# Patient Record
Sex: FEMALE | Race: WHITE | NOT HISPANIC OR LATINO | Employment: OTHER | URBAN - METROPOLITAN AREA
[De-identification: names, ages, dates, MRNs, and addresses within clinical notes are randomized per-mention and may not be internally consistent; named-entity substitution may affect disease eponyms.]

---

## 2017-01-01 ENCOUNTER — HOSPITAL ENCOUNTER (OUTPATIENT)
Dept: LAB | Facility: MEDICAL CENTER | Age: 62
End: 2017-01-01
Attending: INTERNAL MEDICINE
Payer: MEDICARE

## 2017-01-05 LAB
HCT VFR BLD AUTO: 31.5 % (ref 37–47)
HGB BLD-MCNC: 9.4 G/DL (ref 12–16)

## 2017-01-05 PROCEDURE — 36415 COLL VENOUS BLD VENIPUNCTURE: CPT

## 2017-01-05 PROCEDURE — 85014 HEMATOCRIT: CPT

## 2017-01-05 PROCEDURE — 85018 HEMOGLOBIN: CPT

## 2017-01-11 ENCOUNTER — HOME HEALTH ADMISSION (OUTPATIENT)
Dept: HOME HEALTH SERVICES | Facility: HOME HEALTHCARE | Age: 62
End: 2017-01-11
Payer: MEDICARE

## 2017-01-11 LAB
HCT VFR BLD AUTO: 30.9 % (ref 37–47)
HGB BLD-MCNC: 9.5 G/DL (ref 12–16)

## 2017-01-11 PROCEDURE — 85018 HEMOGLOBIN: CPT

## 2017-01-11 PROCEDURE — 36415 COLL VENOUS BLD VENIPUNCTURE: CPT

## 2017-01-11 PROCEDURE — 85014 HEMATOCRIT: CPT

## 2017-01-14 ENCOUNTER — HOME CARE VISIT (OUTPATIENT)
Dept: HOME HEALTH SERVICES | Facility: HOME HEALTHCARE | Age: 62
End: 2017-01-14
Payer: MEDICARE

## 2017-01-14 VITALS
RESPIRATION RATE: 18 BRPM | HEART RATE: 92 BPM | TEMPERATURE: 97.4 F | HEIGHT: 67 IN | SYSTOLIC BLOOD PRESSURE: 137 MMHG | DIASTOLIC BLOOD PRESSURE: 84 MMHG

## 2017-01-14 PROCEDURE — 665998 HH PPS REVENUE CREDIT

## 2017-01-14 PROCEDURE — 665001 SOC-HOME HEALTH

## 2017-01-14 PROCEDURE — 665999 HH PPS REVENUE DEBIT

## 2017-01-14 PROCEDURE — G0162 HHC RN E&M PLAN SVS, 15 MIN: HCPCS

## 2017-01-15 PROCEDURE — 665998 HH PPS REVENUE CREDIT

## 2017-01-15 PROCEDURE — 665999 HH PPS REVENUE DEBIT

## 2017-01-15 SDOH — ECONOMIC STABILITY: HOUSING INSECURITY: UNSAFE APPLIANCES: 0

## 2017-01-15 SDOH — ECONOMIC STABILITY: HOUSING INSECURITY: UNSAFE COOKING RANGE AREA: 0

## 2017-01-15 ASSESSMENT — ENCOUNTER SYMPTOMS: MENTAL STATUS CHANGE: 0

## 2017-01-15 ASSESSMENT — PATIENT HEALTH QUESTIONNAIRE - PHQ9
1. LITTLE INTEREST OR PLEASURE IN DOING THINGS: 01
2. FEELING DOWN, DEPRESSED, IRRITABLE, OR HOPELESS: 01

## 2017-01-15 ASSESSMENT — ACTIVITIES OF DAILY LIVING (ADL)
HOME_HEALTH_OASIS: 02
OASIS_M1830: 03

## 2017-01-16 ENCOUNTER — HOME CARE VISIT (OUTPATIENT)
Dept: HOME HEALTH SERVICES | Facility: HOME HEALTHCARE | Age: 62
End: 2017-01-16
Payer: MEDICARE

## 2017-01-16 VITALS
TEMPERATURE: 208.9 F | HEART RATE: 94 BPM | RESPIRATION RATE: 16 BRPM | DIASTOLIC BLOOD PRESSURE: 78 MMHG | SYSTOLIC BLOOD PRESSURE: 142 MMHG

## 2017-01-16 PROCEDURE — 665999 HH PPS REVENUE DEBIT

## 2017-01-16 PROCEDURE — G0156 HHCP-SVS OF AIDE,EA 15 MIN: HCPCS

## 2017-01-16 PROCEDURE — 665998 HH PPS REVENUE CREDIT

## 2017-01-16 PROCEDURE — G0300 HHS/HOSPICE OF LPN EA 15 MIN: HCPCS

## 2017-01-17 ENCOUNTER — OFFICE VISIT (OUTPATIENT)
Dept: MEDICAL GROUP | Facility: MEDICAL CENTER | Age: 62
End: 2017-01-17
Attending: FAMILY MEDICINE
Payer: MEDICARE

## 2017-01-17 ENCOUNTER — PATIENT OUTREACH (OUTPATIENT)
Dept: HEALTH INFORMATION MANAGEMENT | Facility: OTHER | Age: 62
End: 2017-01-17

## 2017-01-17 ENCOUNTER — HOME CARE VISIT (OUTPATIENT)
Dept: HOME HEALTH SERVICES | Facility: HOME HEALTHCARE | Age: 62
End: 2017-01-17
Payer: MEDICARE

## 2017-01-17 VITALS
RESPIRATION RATE: 16 BRPM | HEART RATE: 94 BPM | DIASTOLIC BLOOD PRESSURE: 78 MMHG | SYSTOLIC BLOOD PRESSURE: 142 MMHG | TEMPERATURE: 98.3 F

## 2017-01-17 VITALS
TEMPERATURE: 98.2 F | HEIGHT: 67 IN | DIASTOLIC BLOOD PRESSURE: 82 MMHG | HEART RATE: 80 BPM | BODY MASS INDEX: 42.69 KG/M2 | RESPIRATION RATE: 16 BRPM | OXYGEN SATURATION: 98 % | WEIGHT: 272 LBS | SYSTOLIC BLOOD PRESSURE: 118 MMHG

## 2017-01-17 VITALS
TEMPERATURE: 100 F | SYSTOLIC BLOOD PRESSURE: 140 MMHG | RESPIRATION RATE: 16 BRPM | HEART RATE: 80 BPM | DIASTOLIC BLOOD PRESSURE: 90 MMHG

## 2017-01-17 DIAGNOSIS — E11.8 DM (DIABETES MELLITUS) WITH COMPLICATIONS (HCC): ICD-10-CM

## 2017-01-17 DIAGNOSIS — Z78.9 IMPAIRED MOBILITY AND ADLS: ICD-10-CM

## 2017-01-17 DIAGNOSIS — Z74.09 IMPAIRED MOBILITY AND ADLS: ICD-10-CM

## 2017-01-17 DIAGNOSIS — Z74.1 REQUIRES DAILY ASSISTANCE FOR ACTIVITIES OF DAILY LIVING (ADL) AND COMFORT NEEDS: ICD-10-CM

## 2017-01-17 DIAGNOSIS — E66.01 MORBID OBESITY WITH BMI OF 40.0-44.9, ADULT (HCC): ICD-10-CM

## 2017-01-17 DIAGNOSIS — F39 MOOD DISORDER (HCC): ICD-10-CM

## 2017-01-17 DIAGNOSIS — G89.4 CHRONIC PAIN SYNDROME: ICD-10-CM

## 2017-01-17 DIAGNOSIS — M62.81 LEFT-SIDED MUSCLE WEAKNESS: ICD-10-CM

## 2017-01-17 DIAGNOSIS — E78.00 HYPERCHOLESTEREMIA: ICD-10-CM

## 2017-01-17 PROCEDURE — G0151 HHCP-SERV OF PT,EA 15 MIN: HCPCS

## 2017-01-17 PROCEDURE — 99214 OFFICE O/P EST MOD 30 MIN: CPT | Performed by: FAMILY MEDICINE

## 2017-01-17 PROCEDURE — 665999 HH PPS REVENUE DEBIT

## 2017-01-17 PROCEDURE — 665998 HH PPS REVENUE CREDIT

## 2017-01-17 SDOH — ECONOMIC STABILITY: HOUSING INSECURITY: UNSAFE COOKING RANGE AREA: 0

## 2017-01-17 SDOH — ECONOMIC STABILITY: HOUSING INSECURITY: UNSAFE APPLIANCES: 0

## 2017-01-17 ASSESSMENT — LIFESTYLE VARIABLES: SUBSTANCE_ABUSE: 0

## 2017-01-17 ASSESSMENT — ENCOUNTER SYMPTOMS
TINGLING: 1
ABDOMINAL PAIN: 0
BACK PAIN: 1
DEPRESSION: 1
SHORTNESS OF BREATH: 0
COUGH: 0
FEVER: 0
NECK PAIN: 1
CHILLS: 0
RESPIRATORY SYMPTOMS COMMENTS: PT LUNGS CLEAR IN ALL FIELDS, NO ADVANTAGEOUS SOUND NOTED.
VOMITING: 0
HEADACHES: 0
DEBILITATING PAIN: 1
FOCAL WEAKNESS: 1
PALPITATIONS: 0
SPEECH CHANGE: 0
NERVOUS/ANXIOUS: 0
TREMORS: 0
SENSORY CHANGE: 0
EYES NEGATIVE: 1
INSOMNIA: 0
NAUSEA: 0
HALLUCINATIONS: 0

## 2017-01-17 ASSESSMENT — ACTIVITIES OF DAILY LIVING (ADL)
ADLS_COMMENTS: <!--EPICS-->SEE OT EVAL<!--EPICE-->
IADLS_COMMENTS: <!--EPICS-->SEE OT EVAL<!--EPICE-->

## 2017-01-17 NOTE — MR AVS SNAPSHOT
"        Ariella Sanchez   2017 3:50 PM   Office Visit   MRN: [id number]    Department:  [facility]care Center   Dept Phone:  [phone]    Description:  Female : 1955   Provider:  Joseph Penny M.D.           Reason for Visit     Hospital Follow-up     Medication Management           Allergies as of 2017     Allergen Noted Reactions    Metformin 10/18/2011   Nausea    GI upset. No hypersensitivity symptoms.   RXN=unknown      You were diagnosed with     Chronic pain syndrome   [338.4.ICD-9-CM]       DM (diabetes mellitus) with complications (CMS-HCC)   [728198]         Vital Signs     Blood Pressure Pulse Temperature Respirations Height Weight    118/82 mmHg 80 36.8 °C (98.2 °F) 16 1.702 m (5' 7.01\") 123.378 kg (272 lb)    Body Mass Index Oxygen Saturation Smoking Status             42.59 kg/m2 98% Never Smoker          Basic Information     Date Of Birth Sex Race Ethnicity Preferred Language    1955 Female White Non- English      Your appointments     2017 To Be Determined   MSW-HH-HOME VISIT with CORAL Ayala   [facility] (--)    [address]  [place] [address]   [phone]            2017 To Be Determined   SN-HH-HOME VISIT with Moira Sheth L.P.N.   [facility] (--)    [address]  [place] [address]   [phone]            2017  1:00 PM   OT-HH-INITIAL EVALUATION with Paul Day O.T.   [facility] (--)    [address]  [place] [address]   [phone]            2017 To Be Determined   AIDE-HH-HOME VISIT with Sharlene Sampson C.N.A.   [facility] (--)    [address]  [place] [address]   [phone]            2017 To Be Determined   SN-HH-HOME VISIT with Tracey Lewis R.N.   [facility] (--)    [address]  [place] [address]   [phone]            2017 To Be Determined   AIDE-HH-HOME VISIT with [facility]   [facility] (--)  "    [address]  [place] [address]   [phone]            Jan 23, 2017 To Be Determined   SN-HH-HOME VISIT with Moira Sheth L.P.N.   [facility] (--)    [address]  [place] [address]   [phone]            Jan 25, 2017 To Be Determined   SN-HH-HOME VISIT with Moira Sheth L.P.N.   [facility] (--)    [address]  [place] [address]   [phone]            Jan 26, 2017 To Be Determined   AIDE-HH-HOME VISIT with [facility]   [facility] (--)    [address]  [place] [address]   [phone]            Jan 27, 2017 To Be Determined   SN-HH-HOME VISIT with Tracey Lewis R.N.   [facility] (--)    [address]  [facility] [address]   [phone]            Jan 30, 2017 To Be Determined   AIDE-HH-HOME VISIT with [facility]   [facility] (--)    [address]  [place] [address]   [phone]            Jan 31, 2017 To Be Determined   SN-HH-HOME VISIT with Moira Sheth L.P.N.   [facility] ([facility]    [address]  [facility] [address]   [phone]            Feb 02, 2017 To Be Determined   AIDE-HH-HOME VISIT with [facility]   [facility] (--)    [address]  [place] [address]   [phone]            Feb 02, 2017  3:30 PM   Established Patient with Joseph Penny M.D.   [facility] ([facility])    [address] [id number] [phone]           You will be receiving a confirmation call a few days before your appointment from our automated call confirmation system.            Feb 03, 2017 To Be Determined   SN-HH-HOME VISIT with Moira Sheth L.P.N.   [facility] (--)    [address]  [facility] [address]   [phone]            Feb 06, 2017 To Be Determined   AIDE-HH-HOME VISIT with [facility]ide   [facility] (--)    [address]  [facility] [address]   [phone]            Feb 07, 2017 To Be Determined   SN-HH-HOME  VISIT with Moira Sheth L.P.N.   [facility] (--)    [address]  [place] [address]   [phone]            Feb 09, 2017 To Be Determined   AIDE-HH-HOME VISIT with [facility]   [facility] (--)    [address]  [place] [address]   [phone]            Feb 10, 2017 To Be Determined   SN-HH-HOME VISIT with Moira Sheth L.P.N.   [facility] (--)    [address]  [place] [address]   [phone]            Feb 13, 2017 To Be Determined   AIDE-HH-HOME VISIT with [facility]   [facility] (--)    [address]  [place] [address]   [phone]            Feb 14, 2017 To Be Determined   SN-HH-HOME VISIT with Moira Sheth L.P.N.   [facility] (--)    [address]  [place] [address]   [phone]            Feb 16, 2017 To Be Determined   AIDE-HH-HOME VISIT with [facility]   [facility] (--)    [address]  [place] [address]   [phone]            Feb 17, 2017 To Be Determined   SN-HH-HOME VISIT with Moira Sheth L.P.N.   [facility] (--)    [address]  [place] [address]   [phone]            Feb 20, 2017 To Be Determined   AIDE-HH-HOME VISIT with [facility]   [facility] (--)    [facility]  [place] [address]   [phone]            Feb 21, 2017 To Be Determined   SN-HH-HOME VISIT with Moira Sheth L.P.N.   [facility] (--[facility]    [address]  [place] [address]   [phone]            Feb 23, 2017 To Be Determined   AIDE-HH-HOME VISIT with [facility]   [facility] (--)    [facility] [address]  [facility] [address]   [phone]            Feb 24, 2017 To Be Determined   SN-HH-HOME VISIT with Moira Sheth L.P.N.   [facility] (--)    [address]  [facility] [address]   [phone]            Feb 27, 2017 To Be Determined   AIDE-HH-HOME VISIT with [facility]   [facility] (--)    [address]  [place] [address]   [phone]            Feb 28, 2017 To Be Determined   SN-HH-HOME VISIT with Moira Sheth L.P.N.   [facility]    [address]  [place] [address]   [phone]            Mar 02, 2017 To Be Determined   AIDE-HH-HOME VISIT with [facility]   [facility] (--)    [address]  [place] [address]   [phone]            Mar 03, 2017 To Be Determined   SN-HH-HOME VISIT + HHA SUP with Tracey Lewis R.N.   [facility] (--)    [facility] [address] [facility] [address]  [place] [address]   [phone]            Mar 06, 2017 To Be Determined   AIDE-HH-HOME VISIT with [facility]   [facility] (--[facility]    [address]  [place] [address]   [phone]            Mar 07, 2017 To Be Determined   SN-HH-HOME VISIT with Moira Sheth L.P.N.   [facility] ([facility]    [address] [address]  [place] [address]   [phone]            Mar 09, 2017 To Be Determined   AIDE-HH-HOME VISIT with [facility]   [facility] (--)    [address]  [place] [address]   [phone]            Mar 10, 2017 To Be Determined   SN-HH-OASIS D/C+LPN/HHA SUP with Tracey Lewis R.N.   [facility] (--)    [facility] [address]  [place] [address]   [phone]              Problem List              ICD-10-CM Priority Class Noted - Resolved    Spontaneous intraparenchymal intracranial hemorrhage, acute (CMS-HCC) I62.9   5/11/2011 - Present    Hypertensive emergency without congestive heart failure I16.1   5/11/2011 - Present    Heartburn R12   5/12/2011 - Present    Obesity E66.9   5/17/2011 - Present    Hyponatremia E87.1   5/17/2011 - Present    Hyperglycemia R73.9   5/17/2011 - Present    Incontinence R32   7/26/2011 - Present    Left-sided muscle weakness M62.81   7/26/2011 - Present    GERD (gastroesophageal reflux disease) K21.9   8/30/2011 - Present    DM (diabetes mellitus) (CMS-[facility]) E11.9   8/30/2011 - Present    Depression F32.9    11/15/2011 - Present    HTN (hypertension) I10   4/30/2012 - Present    Lump in neck R22.1   4/30/2012 - Present    Hypercholesteremia E78.00   6/12/2012 - Present    Chronic pain G89.29   7/23/2012 - Present    DM (diabetes mellitus) (CMS-HCC) E11.9   2/12/2013 - Present    Type 2 diabetes, diet controlled (CMS-HCC) E11.9   3/26/2013 - Present    Impaired mobility and ADLs Z74.09   9/24/2015 - Present    History of spontaneous intraventricular intracranial hemorrhage Z86.79   12/3/2015 - Present    Femur fracture, left (CMS-HCC) S72.92XA   12/18/2016 - Present    Leukocytosis D72.829   12/19/2016 - Present    Dyslipidemia E78.5   12/19/2016 - Present    Neuropathy (CMS-HCC) G62.9   12/19/2016 - Present      Health Maintenance        Date Due Completion Dates    IMM DTaP/Tdap/Td Vaccine (1 - Tdap) 9/22/1972 ---    IMM PNEUMOCOCCAL 19-64 (ADULT) MEDIUM RISK SERIES (1 of 1 - PPSV23) 9/22/1972 ---    COLONOSCOPY 9/22/2003 ---    IMM ZOSTER VACCINE 9/22/2013 ---    MAMMOGRAM 5/16/2015 5/16/2014    RETINAL SCREENING 12/1/2015 12/1/2014 (Done)    Override on 12/1/2014: Done (cataracts removed)    DIABETES MONOFILAMENT / LE EXAM 4/1/2016 4/1/2015 (Done)    Override on 4/1/2015: Done (pt referred to podiatry)    FASTING LIPID PROFILE 6/17/2016 6/17/2015, 6/19/2014, 12/23/2013, 3/11/2013, 7/23/2012, 5/30/2012    URINE ACR / MICROALBUMIN 6/17/2016 6/17/2015, 6/14/2014    PAP SMEAR 6/6/2017 6/6/2014    A1C SCREENING 6/20/2017 12/20/2016, 6/17/2015, 6/19/2014, 12/23/2013, 3/11/2013, 5/30/2012    SERUM CREATININE 12/22/2017 12/22/2016, 12/20/2016, 12/19/2016, 12/18/2016, 6/17/2015, 6/19/2014, 12/23/2013, 3/11/2013, 7/23/2012, 5/30/2012, 5/18/2011, 5/17/2011, 5/16/2011, 5/15/2011, 5/14/2011, 5/13/2011, 5/12/2011, 5/11/2011, 5/10/2011            Current Immunizations     Influenza TIV (IM) 11/6/2012  8:28 AM    Influenza Vaccine Quad Inj (Pf) 12/20/2016 11:01 PM      Below and/or attached are the medications your provider  expects you to take. Review all of your home medications and newly ordered medications with your provider and/or pharmacist. Follow medication instructions as directed by your provider and/or pharmacist. Please keep your medication list with you and share with your provider. Update the information when medications are discontinued, doses are changed, or new medications (including over-the-counter products) are added; and carry medication information at all times in the event of emergency situations     Allergies:  METFORMIN - Nausea               Medications  Valid as of: January 17, 2017 -  4:43 PM    Generic Name Brand Name Tablet Size Instructions for use    Ascorbic Acid (Tab) VITAMIN C 500 MG Take 1 Tab by mouth every day.        Docusate Sodium (Cap)  MG Take 100 mg by mouth every morning.        Famotidine (Tab) PEPCID 20 MG Take 1 Tab by mouth 2 times daily, before breakfast and dinner.        Ferrous Sulfate (Tab) ferrous sulfate 325 (65 FE) MG Take 1 Tab by mouth every day.        Heparin Sodium (Porcine) (Solution) heparin 5000 UNIT/ML Inject 1 mL as instructed every 8 hours.        Insulin Lispro (Solution) HUMALOG 100 UNIT/ML Inject 1-6 Units as instructed 4 Times a Day,Before Meals and at Bedtime.        OxyCODONE HCl (Tab) ROXICODONE 10 MG Take 0.5-1 Tabs by mouth every 3 hours as needed for Severe Pain ((NRS Pain Scale 7-10; CPOT Pain Scale 6-8)).        Pregabalin (Cap) LYRICA 75 MG Take 75 mg by mouth 2 times a day.        RaNITidine HCl (Tab) ZANTAC 300 MG Take 150 mg by mouth 2 times daily, before breakfast and dinner.        TraMADol HCl (Tab) ULTRAM 50 MG Take 50 mg by mouth every 8 hours as needed.        TraZODone HCl (Tab) DESYREL 50 MG Take 1 Tab by mouth every bedtime.        Venlafaxine HCl (Tab) EFFEXOR 75 MG Take  mg by mouth 2 times daily, before breakfast and dinner. 150 mg in morning and 75 mg in evening        .                 Medicines prescribed today were sent to:       [id number] - [place] - [address]    [address] [place] [address]    Phone: [phone] Fax: [phone]    Open 24 Hours?: No      Medication refill instructions:       If your prescription bottle indicates you have medication refills left, it is not necessary to call your provider’s office. Please contact your pharmacy and they will refill your medication.    If your prescription bottle indicates you do not have any refills left, you may request refills at any time through one of the following ways: The online [employer] system (except Urgent Care), by calling your provider’s office, or by asking your pharmacy to contact your provider’s office with a refill request. Medication refills are processed only during regular business hours and may not be available until the next business day. Your provider may request additional information or to have a follow-up visit with you prior to refilling your medication.   *Please Note: Medication refills are assigned a new Rx number when refilled electronically. Your pharmacy may indicate that no refills were authorized even though a new prescription for the same medication is available at the pharmacy. Please request the medicine by name with the pharmacy before contacting your provider for a refill.        Your To Do List     Future Labs/Procedures Complete By Expires    CBC WITH DIFFERENTIAL  As directed 1/17/2018    COMP METABOLIC PANEL  As directed 1/17/2018    HEMOGLOBIN A1C  As directed 1/17/2018    LIPID PROFILE  As directed 1/17/2018    MICROALBUMIN CREAT RATIO URINE  As directed 1/17/2018    TSH WITH REFLEX TO FT4  As directed 1/17/2018    VITAMIN D,25 HYDROXY  As directed 1/17/2018      Referral     A referral request has been sent to our patient care coordination department. Please allow 3-5 business days for us to process this request and contact you either by phone or mail. If you do not hear from us by the 5th business day, please call  us at [phone].           [employer] Access Code: Activation code not generated  Current [employer] Status: Active

## 2017-01-17 NOTE — PROGRESS NOTES
YOB: 1955 SSN: [id number]   Age: 61 yrs Sex: Female   Home phone: [phone] Work phone:    Address: [facility] [address]  [place] [address] E-mail: [email or web address]   Permanent comments: 3/28 d/c per Radha  5/7/14 [facility]- [facility] OK PER PT       Order Information   Order #: [id number] Procedure: REFERRAL TO SOCIAL WORK   Order Date: 1/17/2017 Proc Category: Outpatient Referral Orderables   Priority: Routine Status: Sent   Class: Referral Ordering User: Julien Penny M.D.   Auth Provider: JULEIN PENNY Provider: Julien Penny M.D.   Diagnosis: Requires daily assistance for activities of daily living (ADL) and comfort needs  Impaired mobility and ADLs   Department: Healthcare Center   Sched Instruct:    Comment: Note from home care PT stating pt unsafe in current home, she has not been seen in clinic since 2015 but may not be able to care for self. Please assess if possible.     Linked Chargeables:        Referred this request to [facility] for follow up.  Spoke with Linette (nurse) and she reports that [facility] is schedule to follow up with the patient.

## 2017-01-18 ENCOUNTER — TELEPHONE (OUTPATIENT)
Dept: MEDICAL GROUP | Facility: MEDICAL CENTER | Age: 62
End: 2017-01-18

## 2017-01-18 ENCOUNTER — HOME CARE VISIT (OUTPATIENT)
Dept: HOME HEALTH SERVICES | Facility: HOME HEALTHCARE | Age: 62
End: 2017-01-18
Payer: MEDICARE

## 2017-01-18 ENCOUNTER — HOSPITAL ENCOUNTER (OUTPATIENT)
Facility: MEDICAL CENTER | Age: 62
End: 2017-01-20
Attending: EMERGENCY MEDICINE | Admitting: INTERNAL MEDICINE
Payer: MEDICARE

## 2017-01-18 ENCOUNTER — RESOLUTE PROFESSIONAL BILLING HOSPITAL PROF FEE (OUTPATIENT)
Dept: HOSPITALIST | Facility: MEDICAL CENTER | Age: 62
End: 2017-01-18
Payer: MEDICARE

## 2017-01-18 VITALS
DIASTOLIC BLOOD PRESSURE: 58 MMHG | RESPIRATION RATE: 18 BRPM | SYSTOLIC BLOOD PRESSURE: 120 MMHG | TEMPERATURE: 100 F | HEART RATE: 81 BPM

## 2017-01-18 DIAGNOSIS — R46.0 SELF-CARE DEFICIT FOR BATHING: ICD-10-CM

## 2017-01-18 DIAGNOSIS — R53.81 DEBILITY: ICD-10-CM

## 2017-01-18 DIAGNOSIS — Z74.09 DECREASED AMBULATION STATUS: ICD-10-CM

## 2017-01-18 PROBLEM — R62.7 FAILURE TO THRIVE IN ADULT: Status: ACTIVE | Noted: 2017-01-18

## 2017-01-18 LAB
ALBUMIN SERPL BCP-MCNC: 3.3 G/DL (ref 3.2–4.9)
ALBUMIN/GLOB SERPL: 0.9 G/DL
ALP SERPL-CCNC: 101 U/L (ref 30–99)
ALT SERPL-CCNC: 11 U/L (ref 2–50)
ANION GAP SERPL CALC-SCNC: 8 MMOL/L (ref 0–11.9)
APPEARANCE UR: ABNORMAL
AST SERPL-CCNC: 15 U/L (ref 12–45)
BACTERIA #/AREA URNS HPF: ABNORMAL /HPF
BASOPHILS # BLD AUTO: 0.4 % (ref 0–1.8)
BASOPHILS # BLD: 0.03 K/UL (ref 0–0.12)
BILIRUB SERPL-MCNC: 0.6 MG/DL (ref 0.1–1.5)
BILIRUB UR QL STRIP.AUTO: NEGATIVE
BUN SERPL-MCNC: 11 MG/DL (ref 8–22)
CALCIUM SERPL-MCNC: 8.7 MG/DL (ref 8.4–10.2)
CASTS URNS QL MICRO: ABNORMAL /LPF
CHLORIDE SERPL-SCNC: 105 MMOL/L (ref 96–112)
CO2 SERPL-SCNC: 23 MMOL/L (ref 20–33)
COLOR UR: YELLOW
CREAT SERPL-MCNC: 0.76 MG/DL (ref 0.5–1.4)
CULTURE IF INDICATED INDCX: YES UA CULTURE
EOSINOPHIL # BLD AUTO: 0.02 K/UL (ref 0–0.51)
EOSINOPHIL NFR BLD: 0.3 % (ref 0–6.9)
EPI CELLS #/AREA URNS HPF: ABNORMAL /HPF
ERYTHROCYTE [DISTWIDTH] IN BLOOD BY AUTOMATED COUNT: 45.6 FL (ref 35.9–50)
GFR SERPL CREATININE-BSD FRML MDRD: >60 ML/MIN/1.73 M 2
GLOBULIN SER CALC-MCNC: 3.6 G/DL (ref 1.9–3.5)
GLUCOSE SERPL-MCNC: 140 MG/DL (ref 65–99)
GLUCOSE UR STRIP.AUTO-MCNC: NEGATIVE MG/DL
HCT VFR BLD AUTO: 35.9 % (ref 37–47)
HGB BLD-MCNC: 11.1 G/DL (ref 12–16)
IMM GRANULOCYTES # BLD AUTO: 0.03 K/UL (ref 0–0.11)
IMM GRANULOCYTES NFR BLD AUTO: 0.4 % (ref 0–0.9)
KETONES UR STRIP.AUTO-MCNC: NEGATIVE MG/DL
LEUKOCYTE ESTERASE UR QL STRIP.AUTO: NEGATIVE
LYMPHOCYTES # BLD AUTO: 2.33 K/UL (ref 1–4.8)
LYMPHOCYTES NFR BLD: 31 % (ref 22–41)
MCH RBC QN AUTO: 28.4 PG (ref 27–33)
MCHC RBC AUTO-ENTMCNC: 30.9 G/DL (ref 33.6–35)
MCV RBC AUTO: 91.8 FL (ref 81.4–97.8)
MICRO URNS: ABNORMAL
MONOCYTES # BLD AUTO: 0.58 K/UL (ref 0–0.85)
MONOCYTES NFR BLD AUTO: 7.7 % (ref 0–13.4)
MUCOUS THREADS #/AREA URNS HPF: ABNORMAL /HPF
NEUTROPHILS # BLD AUTO: 4.53 K/UL (ref 2–7.15)
NEUTROPHILS NFR BLD: 60.2 % (ref 44–72)
NITRITE UR QL STRIP.AUTO: POSITIVE
NRBC # BLD AUTO: 0 K/UL
NRBC BLD AUTO-RTO: 0 /100 WBC
PH UR STRIP.AUTO: 5.5 [PH]
PLATELET # BLD AUTO: 296 K/UL (ref 164–446)
PMV BLD AUTO: 11.2 FL (ref 9–12.9)
POTASSIUM SERPL-SCNC: 4 MMOL/L (ref 3.6–5.5)
PROT SERPL-MCNC: 6.9 G/DL (ref 6–8.2)
PROT UR QL STRIP: NEGATIVE MG/DL
RBC # BLD AUTO: 3.91 M/UL (ref 4.2–5.4)
RBC # URNS HPF: ABNORMAL /HPF
RBC UR QL AUTO: NEGATIVE
SODIUM SERPL-SCNC: 136 MMOL/L (ref 135–145)
SP GR UR REFRACTOMETRY: 1.03
UNIDENT CRYS URNS QL MICRO: ABNORMAL /HPF
WBC # BLD AUTO: 7.5 K/UL (ref 4.8–10.8)
WBC #/AREA URNS HPF: ABNORMAL /HPF

## 2017-01-18 PROCEDURE — G0155 HHCP-SVS OF CSW,EA 15 MIN: HCPCS

## 2017-01-18 PROCEDURE — 665998 HH PPS REVENUE CREDIT

## 2017-01-18 PROCEDURE — 87077 CULTURE AEROBIC IDENTIFY: CPT

## 2017-01-18 PROCEDURE — 81001 URINALYSIS AUTO W/SCOPE: CPT

## 2017-01-18 PROCEDURE — 700102 HCHG RX REV CODE 250 W/ 637 OVERRIDE(OP): Performed by: INTERNAL MEDICINE

## 2017-01-18 PROCEDURE — 85025 COMPLETE CBC W/AUTO DIFF WBC: CPT

## 2017-01-18 PROCEDURE — 99285 EMERGENCY DEPT VISIT HI MDM: CPT

## 2017-01-18 PROCEDURE — A9270 NON-COVERED ITEM OR SERVICE: HCPCS | Performed by: INTERNAL MEDICINE

## 2017-01-18 PROCEDURE — 87186 SC STD MICRODIL/AGAR DIL: CPT

## 2017-01-18 PROCEDURE — 87086 URINE CULTURE/COLONY COUNT: CPT

## 2017-01-18 PROCEDURE — 99220 PR INITIAL OBSERVATION CARE,LEVL III: CPT | Performed by: INTERNAL MEDICINE

## 2017-01-18 PROCEDURE — G0378 HOSPITAL OBSERVATION PER HR: HCPCS

## 2017-01-18 PROCEDURE — G0152 HHCP-SERV OF OT,EA 15 MIN: HCPCS

## 2017-01-18 PROCEDURE — 665997 HH PPS REVENUE ADJ

## 2017-01-18 PROCEDURE — 36415 COLL VENOUS BLD VENIPUNCTURE: CPT

## 2017-01-18 PROCEDURE — 700111 HCHG RX REV CODE 636 W/ 250 OVERRIDE (IP): Performed by: INTERNAL MEDICINE

## 2017-01-18 PROCEDURE — 80053 COMPREHEN METABOLIC PANEL: CPT

## 2017-01-18 PROCEDURE — 665999 HH PPS REVENUE DEBIT

## 2017-01-18 RX ORDER — AMOXICILLIN 250 MG
1 CAPSULE ORAL
Status: DISCONTINUED | OUTPATIENT
Start: 2017-01-18 | End: 2017-01-20 | Stop reason: HOSPADM

## 2017-01-18 RX ORDER — VENLAFAXINE HYDROCHLORIDE 75 MG/1
75-150 CAPSULE, EXTENDED RELEASE ORAL 2 TIMES DAILY
Status: SHIPPED | COMMUNITY
End: 2017-01-18

## 2017-01-18 RX ORDER — VENLAFAXINE HYDROCHLORIDE 75 MG/1
75-150 TABLET, EXTENDED RELEASE ORAL 2 TIMES DAILY
Status: DISCONTINUED | OUTPATIENT
Start: 2017-01-18 | End: 2017-01-18

## 2017-01-18 RX ORDER — TRAZODONE HYDROCHLORIDE 50 MG/1
50 TABLET ORAL
Status: DISCONTINUED | OUTPATIENT
Start: 2017-01-18 | End: 2017-01-20 | Stop reason: HOSPADM

## 2017-01-18 RX ORDER — PROMETHAZINE HYDROCHLORIDE 25 MG/1
12.5-25 SUPPOSITORY RECTAL EVERY 4 HOURS PRN
Status: DISCONTINUED | OUTPATIENT
Start: 2017-01-18 | End: 2017-01-20 | Stop reason: HOSPADM

## 2017-01-18 RX ORDER — PROMETHAZINE HYDROCHLORIDE 25 MG/1
12.5-25 TABLET ORAL EVERY 4 HOURS PRN
Status: DISCONTINUED | OUTPATIENT
Start: 2017-01-18 | End: 2017-01-20 | Stop reason: HOSPADM

## 2017-01-18 RX ORDER — ONDANSETRON 4 MG/1
4 TABLET, ORALLY DISINTEGRATING ORAL EVERY 4 HOURS PRN
Status: DISCONTINUED | OUTPATIENT
Start: 2017-01-18 | End: 2017-01-20 | Stop reason: HOSPADM

## 2017-01-18 RX ORDER — VENLAFAXINE HYDROCHLORIDE 75 MG/1
75-150 TABLET, EXTENDED RELEASE ORAL 2 TIMES DAILY
COMMUNITY
End: 2021-09-19

## 2017-01-18 RX ORDER — FAMOTIDINE 20 MG/1
20 TABLET, FILM COATED ORAL 2 TIMES DAILY
Status: DISCONTINUED | OUTPATIENT
Start: 2017-01-18 | End: 2017-01-20 | Stop reason: HOSPADM

## 2017-01-18 RX ORDER — ONDANSETRON 2 MG/ML
4 INJECTION INTRAMUSCULAR; INTRAVENOUS EVERY 4 HOURS PRN
Status: DISCONTINUED | OUTPATIENT
Start: 2017-01-18 | End: 2017-01-20 | Stop reason: HOSPADM

## 2017-01-18 RX ORDER — RANITIDINE 300 MG/1
300 TABLET ORAL
Status: DISCONTINUED | OUTPATIENT
Start: 2017-01-18 | End: 2017-01-18

## 2017-01-18 RX ORDER — PREGABALIN 25 MG/1
75 CAPSULE ORAL 3 TIMES DAILY
Status: DISCONTINUED | OUTPATIENT
Start: 2017-01-18 | End: 2017-01-20 | Stop reason: HOSPADM

## 2017-01-18 RX ORDER — LACTULOSE 20 G/30ML
30 SOLUTION ORAL
Status: DISCONTINUED | OUTPATIENT
Start: 2017-01-18 | End: 2017-01-20 | Stop reason: HOSPADM

## 2017-01-18 RX ORDER — BISACODYL 10 MG
10 SUPPOSITORY, RECTAL RECTAL
Status: DISCONTINUED | OUTPATIENT
Start: 2017-01-18 | End: 2017-01-20 | Stop reason: HOSPADM

## 2017-01-18 RX ORDER — TRAMADOL HYDROCHLORIDE 50 MG/1
50 TABLET ORAL EVERY 8 HOURS PRN
Status: DISCONTINUED | OUTPATIENT
Start: 2017-01-18 | End: 2017-01-19

## 2017-01-18 RX ORDER — VENLAFAXINE HYDROCHLORIDE 75 MG/1
150 CAPSULE, EXTENDED RELEASE ORAL
Status: DISCONTINUED | OUTPATIENT
Start: 2017-01-19 | End: 2017-01-20 | Stop reason: HOSPADM

## 2017-01-18 RX ORDER — VENLAFAXINE HYDROCHLORIDE 75 MG/1
75 CAPSULE, EXTENDED RELEASE ORAL
Status: DISCONTINUED | OUTPATIENT
Start: 2017-01-19 | End: 2017-01-19

## 2017-01-18 RX ORDER — AMOXICILLIN 250 MG
1 CAPSULE ORAL NIGHTLY
Status: DISCONTINUED | OUTPATIENT
Start: 2017-01-18 | End: 2017-01-20 | Stop reason: HOSPADM

## 2017-01-18 RX ORDER — ENEMA 19; 7 G/133ML; G/133ML
1 ENEMA RECTAL
Status: DISCONTINUED | OUTPATIENT
Start: 2017-01-18 | End: 2017-01-20 | Stop reason: HOSPADM

## 2017-01-18 RX ORDER — HEPARIN SODIUM 5000 [USP'U]/ML
5000 INJECTION, SOLUTION INTRAVENOUS; SUBCUTANEOUS EVERY 8 HOURS
Status: DISCONTINUED | OUTPATIENT
Start: 2017-01-18 | End: 2017-01-20 | Stop reason: HOSPADM

## 2017-01-18 RX ORDER — DOCUSATE SODIUM 100 MG/1
100 CAPSULE, LIQUID FILLED ORAL EVERY MORNING
Status: DISCONTINUED | OUTPATIENT
Start: 2017-01-19 | End: 2017-01-20 | Stop reason: HOSPADM

## 2017-01-18 RX ADMIN — TRAZODONE HYDROCHLORIDE 50 MG: 50 TABLET ORAL at 22:09

## 2017-01-18 RX ADMIN — FAMOTIDINE 20 MG: 20 TABLET ORAL at 21:11

## 2017-01-18 RX ADMIN — TRAMADOL HYDROCHLORIDE 50 MG: 50 TABLET, FILM COATED ORAL at 17:53

## 2017-01-18 RX ADMIN — HEPARIN SODIUM 5000 UNITS: 5000 INJECTION, SOLUTION INTRAVENOUS; SUBCUTANEOUS at 17:54

## 2017-01-18 RX ADMIN — PREGABALIN 75 MG: 25 CAPSULE ORAL at 17:53

## 2017-01-18 ASSESSMENT — LIFESTYLE VARIABLES
DO YOU DRINK ALCOHOL: NO
EVER_SMOKED: NEVER

## 2017-01-18 ASSESSMENT — ACTIVITIES OF DAILY LIVING (ADL)
MEAL_PREP_ASSISTANCE: 4
BATHING_ASSISTANCE: 6
ORAL_CARE_ASSISTANCE: 1
GROOMING_ASSISTANCE: 1
EATING_ASSISTANCE: 1
HOUSEKEEPING_EQUIPMENT_USED: ELECTRIC WHEELCHAIR
TELEPHONE_ASSISTANCE: 0
DRESSING_LB_ASSISTANCE: 4
BATHING_ASSISTIVE_EQUIPMENT_NEEDED: TRANSFER TUB BENCH
SHOPPING_ASSISTANCE: 6
LAUNDRY_ASSISTANCE: 6
TOILETING_ASSISTANCE: 6
DRESSING_UB_ASSISTANCE: 4
TRANSPORTATION_ASSISTANCE: 6
HOUSEKEEPING_ASSISTANCE: 6
MEAL_PREP_EQUIPMENT_USED: ELECTRIC WHEELCHAIR

## 2017-01-18 ASSESSMENT — PATIENT HEALTH QUESTIONNAIRE - PHQ9
SUM OF ALL RESPONSES TO PHQ QUESTIONS 1-9: 2
SUM OF ALL RESPONSES TO PHQ9 QUESTIONS 1 AND 2: 2
1. LITTLE INTEREST OR PLEASURE IN DOING THINGS: SEVERAL DAYS
2. FEELING DOWN, DEPRESSED, IRRITABLE, OR HOPELESS: SEVERAL DAYS

## 2017-01-18 ASSESSMENT — PAIN SCALES - GENERAL
PAINLEVEL_OUTOF10: 6
PAINLEVEL_OUTOF10: 2
PAINLEVEL_OUTOF10: 0
PAINLEVEL_OUTOF10: 0

## 2017-01-18 ASSESSMENT — PAIN SCALES - WONG BAKER: WONGBAKER_NUMERICALRESPONSE: HURTS JUST A LITTLE BIT

## 2017-01-18 NOTE — PROGRESS NOTES
Subjective:      Ariella Sanchez is a 61 y.o. female who presents with No chief complaint on file.            HPI Comments: Pt here to reestablish with the clinic, she has history of DM, hyperlipidemia, GERD, recent femoral fracture, mood disorder (attending a counseling group called [facility]), HTN, CVA (L sided weakness), and impaired ADLs.    Her blood pressure is slightly elevated today but she states she has been taking her medications as directed. We'll have her also check her blood pressures at home possibly through home health and keep notes. If she has any chest pain or shortness of breath will have her go to the emergency room for further evaluation.    She is currently living with a one bedroom apt with an elevator. She recently broke her femur and has been non weight bearing she gets probably little and is to be sold per her orthopedic surgeon. She has been getting help from home physical therapy as well as some home health nursing. But due significant impaired activities of daily living she may need placement in a long-term care facility so a referral has been made to social work for that purpose. If for whatever reason she is not able to be placed as an outpatient will have patient go back to the emergency room for possible admission to the hospital and possible placement to  for long-term care.    She states she has been taking her medication as directed but she has not had any recent lab work. Will recheck her A1c and micral albumin creatinine ratio and also a metabolic panel. We'll also recheck a thyroid function. We'll also have her check her blood sugars at home and continue her medication as directed.    Patient states she has been following up with her neurologist for her history of left-sided weakness as well as a past history of a stroke. She states that her symptoms have been stable.  Due to her history of her chronic illnesses she has had episodes of depression but has not  "wanted to hurt herself or others. She has been attending a group called [facility] which she says has been giving her some help in managing her anxiety and depression. We'll continue to do so if she has any sudden worsening of her symptoms we'll have her go to the emergency room for further management.      Review of Systems   Constitutional: Negative for fever and chills.   HENT: Negative for hearing loss.    Eyes: Negative.    Respiratory: Negative for cough and shortness of breath.    Cardiovascular: Negative for chest pain and palpitations.   Gastrointestinal: Negative for nausea, vomiting and abdominal pain.   Musculoskeletal: Positive for back pain, joint pain and neck pain.   Neurological: Positive for tingling and focal weakness. Negative for tremors, sensory change, speech change and headaches.   Psychiatric/Behavioral: Positive for depression. Negative for suicidal ideas, hallucinations and substance abuse. The patient is not nervous/anxious and does not have insomnia.           Objective:     Filed Vitals:    01/17/17 1629   BP: 118/82   Pulse: 80   Temp: 36.8 °C (98.2 °F)   Resp: 16   Height: 1.702 m (5' 7.01\")   Weight: 123.378 kg (272 lb)   SpO2: 98%         Physical Exam   Constitutional:   BMI > 42   HENT:   Right Ear: External ear normal.   Left Ear: External ear normal.   Nose: Nose normal.   Mouth/Throat: Oropharynx is clear and moist.   Eyes: EOM are normal. Pupils are equal, round, and reactive to light.   Neck: Normal range of motion.   Cardiovascular: Normal rate, regular rhythm and normal heart sounds.  Exam reveals no friction rub.    No murmur heard.  Pulmonary/Chest: Breath sounds normal. No respiratory distress. She has no wheezes. She has no rales.   Abdominal: Soft. Bowel sounds are normal. She exhibits no distension. There is no tenderness.   Musculoskeletal:   Pt non weight bearing on power wheelchair   Neurological: She is alert.   Skin: Skin is warm and dry.   Psychiatric: Her " behavior is normal.               Assessment/Plan:     1. Chronic pain syndrome  Will have her continue to use her medications as directed, will also refer to pain management for additional assistance in managing her chronic pain. She has been following up with her orthopedic surgeon for her continued management of her femoral fracture.   - REFERRAL TO PAIN CLINIC    2. DM (diabetes mellitus) with complications (CMS-HCC)  Will have her continue to take her medications as directed. Will recheck her A1c and microalb/creat ratio. Due to her recent fracture will also check her thyroid function will continue to follow.  - TSH WITH REFLEX TO FT4; Future  - LIPID PROFILE; Future  - COMP METABOLIC PANEL; Future  - CBC WITH DIFFERENTIAL; Future  - HEMOGLOBIN A1C; Future  - MICROALBUMIN CREAT RATIO URINE; Future  - VITAMIN D,25 HYDROXY; Future    3. Left-sided muscle weakness  Will have her continue to use her PMD as directed and also continue to follow up with her neurologist for further assistance in management of her CVA. Will continue to follow.     4. Impaired mobility and ADLs  Will have her continue to use her mobility devise and will also try to get a SW to help place her in a long term care facility for additional assistance in her daily care, will continue to follow.    5. Hypercholesteremia  She has been advised to increase the fibers in his diet, avoid fatty/fried foods and try fish oil supplements if not allergic to seafood. Also advised to exercise as tolerated.       6. Mood disorder (CMS-HCC)  Will have her continue to attend her group sessions, she is not having any SI or HI. She has been advised if she has any worsened sxs she should call [facility] and have herself taken to the er.

## 2017-01-18 NOTE — ED NOTES
Pt bib remsa from home; pt unable to care for self in home environment per pt, per home health RN, PT/OT, SW. Pt d/c'd from [facility] 1-12-17.

## 2017-01-18 NOTE — ED PROVIDER NOTES
"ED Provider Note    CHIEF COMPLAINT  Chief Complaint   Patient presents with   • Failure to Thrive       HPI  Ariella Sanchez is a 61 y.o. female who presents with inability to care for herself. She broke her left leg and required surgery earlier this month she has been in skilled nursing facility until the 12th of this month. Since then she has been home she has been unable to take care of herself she cannot transfer from one location to another she spends the majority of the day in a recliner and is very weak. She is only toe-touch weightbearing in the left leg and has a significant difficult time maneuvering around her apartment. She saw her primary care doctor yesterday who agreed that she was unable to take care of herself. She is afraid she will fall and hurt herself more at this time. She denies any fevers chills no nausea vomiting no severe postsurgical pain.      REVIEW OF SYSTEMS  positive for inability to care for self, negative for fevers, vomiting. All other systems are negative.     PAST MEDICAL HISTORY   has a past medical history of Hypertension; Diabetes; Stroke (CMS-HCC) (2011); and Leg pain.    SOCIAL HISTORY  Social History     Social History Main Topics   • Smoking status: Never Smoker    • Smokeless tobacco: Never Used   • Alcohol Use: No   • Drug Use: No   • Sexual Activity: Not on file       SURGICAL HISTORY   has past surgical history that includes tonsillectomy; tubal ligation; and femur orif (Left, 12/19/2016).    CURRENT MEDICATIONS  Home Medications     **Home medications have not yet been reviewed for this encounter**          ALLERGIES  Allergies   Allergen Reactions   • Metformin Nausea     GI upset. No hypersensitivity symptoms.   RXN=unknown       PHYSICAL EXAM  VITAL SIGNS: /91 mmHg  Pulse 76  Resp 18  Ht 1.702 m (5' 7\")  Wt 137.44 kg (303 lb)  BMI 47.45 kg/m2.  Constitutional: Alert in no apparent distress.  HENT: No signs of trauma, Bilateral external ears normal, Nose " normal.   Eyes: Pupils are equal and reactive, Conjunctiva normal, Non-icteric.   Neck: Normal range of motion, No tenderness, Supple, No stridor.   Cardiovascular: Regular rate and rhythm, no murmurs.   Thorax & Lungs: Normal breath sounds, No respiratory distress, No wheezing, No chest tenderness.   Abdomen: Bowel sounds normal, Soft, No tenderness, No masses, No peritoneal signs.  Skin: Warm, Dry, No erythema, No rash.   Back: No bony tenderness, No CVA tenderness.   Musculoskeletal:  no major deformities noted.   Neurologic: Alert,  No focal deficits noted.   Psychiatric: Affect normal, Judgment normal, Mood normal.       DIAGNOSTIC STUDIES / PROCEDURES    LABS  Results for orders placed or performed during the hospital encounter of 01/18/17   CBC WITH DIFFERENTIAL   Result Value Ref Range    WBC 7.5 4.8 - 10.8 K/uL    RBC 3.91 (L) 4.20 - 5.40 M/uL    Hemoglobin 11.1 (L) 12.0 - 16.0 g/dL    Hematocrit 35.9 (L) 37.0 - 47.0 %    MCV 91.8 81.4 - 97.8 fL    MCH 28.4 27.0 - 33.0 pg    MCHC 30.9 (L) 33.6 - 35.0 g/dL    RDW 45.6 35.9 - 50.0 fL    Platelet Count 296 164 - 446 K/uL    MPV 11.2 9.0 - 12.9 fL    Neutrophils-Polys 60.20 44.00 - 72.00 %    Lymphocytes 31.00 22.00 - 41.00 %    Monocytes 7.70 0.00 - 13.40 %    Eosinophils 0.30 0.00 - 6.90 %    Basophils 0.40 0.00 - 1.80 %    Immature Granulocytes 0.40 0.00 - 0.90 %    Nucleated RBC 0.00 /100 WBC    Neutrophils (Absolute) 4.53 2.00 - 7.15 K/uL    Lymphs (Absolute) 2.33 1.00 - 4.80 K/uL    Monos (Absolute) 0.58 0.00 - 0.85 K/uL    Eos (Absolute) 0.02 0.00 - 0.51 K/uL    Baso (Absolute) 0.03 0.00 - 0.12 K/uL    Immature Granulocytes (abs) 0.03 0.00 - 0.11 K/uL    NRBC (Absolute) 0.00 K/uL   COMP METABOLIC PANEL   Result Value Ref Range    Sodium 136 135 - 145 mmol/L    Potassium 4.0 3.6 - 5.5 mmol/L    Chloride 105 96 - 112 mmol/L    Co2 23 20 - 33 mmol/L    Anion Gap 8.0 0.0 - 11.9    Glucose 140 (H) 65 - 99 mg/dL    Bun 11 8 - 22 mg/dL    Creatinine 0.76 0.50 -  1.40 mg/dL    Calcium 8.7 8.4 - 10.2 mg/dL    AST(SGOT) 15 12 - 45 U/L    ALT(SGPT) 11 2 - 50 U/L    Alkaline Phosphatase 101 (H) 30 - 99 U/L    Total Bilirubin 0.6 0.1 - 1.5 mg/dL    Albumin 3.3 3.2 - 4.9 g/dL    Total Protein 6.9 6.0 - 8.2 g/dL    Globulin 3.6 (H) 1.9 - 3.5 g/dL    A-G Ratio 0.9 g/dL   ESTIMATED GFR   Result Value Ref Range    GFR If African American >60 >60 mL/min/1.73 m 2    GFR If Non African American >60 >60 mL/min/1.73 m 2         COURSE & MEDICAL DECISION MAKING  Pertinent Labs & Imaging studies reviewed. (See chart for details)  This is a 61-year-old female that presents with inability to care for herself. She has recently had a femur fracture and was at [facility]. She is unable to take care of herself at home she has significant difficulty with mobility. I think she is at risk of hurting herself further living at home without assistance. She'll be admitted for placement.    Patient will be admitted to the hospitalist service and stable condition. Cash transferred at 4:45 PM.    FINAL IMPRESSION  1. Self-care deficit for bathing        2.   3.    This dictation has been creating using voice recognition software. The accuracy of the dictation is limited the abilities of the software.  I expect there may be some errors of grammar and possibly content. I made every attempt to manually correct the errors within my dictation. However errors related to this voice recognition software may still exist and should be interpreted within the appropriate context.      The note accurately reflects work and decisions made by me.  Shannan Ruby  1/18/2017  4:53 PM

## 2017-01-18 NOTE — TELEPHONE ENCOUNTER
occupational therapist and home health nurse sent the patient to the ER, she is not safe to be home. They are hoping to get her in to a skilled nursing facility.

## 2017-01-18 NOTE — ED NOTES
Med Rec completed per patient and [facility] pharmacy  Allergies reviewed  No ORAL antibiotics in last 30 days

## 2017-01-19 ENCOUNTER — HOME CARE VISIT (OUTPATIENT)
Dept: HOME HEALTH SERVICES | Facility: HOME HEALTHCARE | Age: 62
End: 2017-01-19
Payer: MEDICARE

## 2017-01-19 ENCOUNTER — APPOINTMENT (OUTPATIENT)
Dept: RADIOLOGY | Facility: MEDICAL CENTER | Age: 62
End: 2017-01-19
Attending: INTERNAL MEDICINE
Payer: MEDICARE

## 2017-01-19 ENCOUNTER — PATIENT OUTREACH (OUTPATIENT)
Dept: HEALTH INFORMATION MANAGEMENT | Facility: OTHER | Age: 62
End: 2017-01-19

## 2017-01-19 PROCEDURE — G8978 MOBILITY CURRENT STATUS: HCPCS | Mod: CM

## 2017-01-19 PROCEDURE — 700112 HCHG RX REV CODE 229: Performed by: INTERNAL MEDICINE

## 2017-01-19 PROCEDURE — 700111 HCHG RX REV CODE 636 W/ 250 OVERRIDE (IP): Performed by: INTERNAL MEDICINE

## 2017-01-19 PROCEDURE — A9270 NON-COVERED ITEM OR SERVICE: HCPCS | Performed by: INTERNAL MEDICINE

## 2017-01-19 PROCEDURE — 700102 HCHG RX REV CODE 250 W/ 637 OVERRIDE(OP): Performed by: INTERNAL MEDICINE

## 2017-01-19 PROCEDURE — 71010 DX-CHEST-PORTABLE (1 VIEW): CPT

## 2017-01-19 PROCEDURE — 97161 PT EVAL LOW COMPLEX 20 MIN: CPT

## 2017-01-19 PROCEDURE — 665998 HH PPS REVENUE CREDIT

## 2017-01-19 PROCEDURE — G0378 HOSPITAL OBSERVATION PER HR: HCPCS

## 2017-01-19 PROCEDURE — 665999 HH PPS REVENUE DEBIT

## 2017-01-19 PROCEDURE — 99224 PR SUBSEQUENT OBSERVATION CARE,LEVEL I: CPT | Performed by: INTERNAL MEDICINE

## 2017-01-19 PROCEDURE — G8979 MOBILITY GOAL STATUS: HCPCS | Mod: CK

## 2017-01-19 RX ORDER — TRAMADOL HYDROCHLORIDE 50 MG/1
100 TABLET ORAL EVERY 8 HOURS PRN
Status: DISCONTINUED | OUTPATIENT
Start: 2017-01-19 | End: 2017-01-20 | Stop reason: HOSPADM

## 2017-01-19 RX ORDER — VENLAFAXINE HYDROCHLORIDE 75 MG/1
75 CAPSULE, EXTENDED RELEASE ORAL DAILY
Status: DISCONTINUED | OUTPATIENT
Start: 2017-01-19 | End: 2017-01-20 | Stop reason: HOSPADM

## 2017-01-19 RX ADMIN — TRAZODONE HYDROCHLORIDE 50 MG: 50 TABLET ORAL at 22:52

## 2017-01-19 RX ADMIN — VENLAFAXINE HYDROCHLORIDE 150 MG: 75 CAPSULE, EXTENDED RELEASE ORAL at 08:59

## 2017-01-19 RX ADMIN — HEPARIN SODIUM 5000 UNITS: 5000 INJECTION, SOLUTION INTRAVENOUS; SUBCUTANEOUS at 21:44

## 2017-01-19 RX ADMIN — HEPARIN SODIUM 5000 UNITS: 5000 INJECTION, SOLUTION INTRAVENOUS; SUBCUTANEOUS at 06:27

## 2017-01-19 RX ADMIN — NYSTATIN 1500000 UNITS: 100000 POWDER TOPICAL at 08:56

## 2017-01-19 RX ADMIN — TRAMADOL HYDROCHLORIDE 100 MG: 50 TABLET, FILM COATED ORAL at 14:47

## 2017-01-19 RX ADMIN — VENLAFAXINE 75 MG: 75 CAPSULE, EXTENDED RELEASE ORAL at 18:00

## 2017-01-19 RX ADMIN — PREGABALIN 75 MG: 25 CAPSULE ORAL at 14:48

## 2017-01-19 RX ADMIN — FAMOTIDINE 20 MG: 20 TABLET ORAL at 21:44

## 2017-01-19 RX ADMIN — FAMOTIDINE 20 MG: 20 TABLET ORAL at 08:59

## 2017-01-19 RX ADMIN — NYSTATIN 1500000 UNITS: 100000 POWDER TOPICAL at 21:45

## 2017-01-19 RX ADMIN — TRAMADOL HYDROCHLORIDE 50 MG: 50 TABLET, FILM COATED ORAL at 06:27

## 2017-01-19 RX ADMIN — DOCUSATE SODIUM 100 MG: 100 CAPSULE, LIQUID FILLED ORAL at 08:58

## 2017-01-19 RX ADMIN — PREGABALIN 75 MG: 25 CAPSULE ORAL at 08:56

## 2017-01-19 RX ADMIN — ONDANSETRON 4 MG: 4 TABLET, ORALLY DISINTEGRATING ORAL at 06:27

## 2017-01-19 RX ADMIN — HEPARIN SODIUM 5000 UNITS: 5000 INJECTION, SOLUTION INTRAVENOUS; SUBCUTANEOUS at 14:54

## 2017-01-19 RX ADMIN — PREGABALIN 75 MG: 25 CAPSULE ORAL at 21:44

## 2017-01-19 RX ADMIN — TRAMADOL HYDROCHLORIDE 100 MG: 50 TABLET, FILM COATED ORAL at 22:52

## 2017-01-19 ASSESSMENT — PAIN SCALES - GENERAL
PAINLEVEL_OUTOF10: 3
PAINLEVEL_OUTOF10: 5
PAINLEVEL_OUTOF10: 4

## 2017-01-19 ASSESSMENT — ENCOUNTER SYMPTOMS
CHILLS: 0
ABDOMINAL PAIN: 0
FEVER: 0
SHORTNESS OF BREATH: 0
VOMITING: 0
COUGH: 0
NAUSEA: 0

## 2017-01-19 ASSESSMENT — GAIT ASSESSMENTS: GAIT LEVEL OF ASSIST: UNABLE TO PARTICIPATE

## 2017-01-19 NOTE — PROGRESS NOTES
Report received from Liberty at bedside, pt assisted on the bed pan.  Morning assessment and medications given about 0900.  In discussing transferring, pt states she filled out the choice form yesterday in the ED for [facility]; will f/u during rounds.  When asking about her mobility, pt states she was too weak to even stand at home and would only use the slide board to transfer; pt will need dme when she is finally ready to go home.  Pt has redness under her breast and pannus and applied nystatin that was ordered this morning.  Pt complaining that her IV is hurting and wants it out; will ask during rounds.

## 2017-01-19 NOTE — H&P
CHIEF COMPLAINT:  Inability to move and failure to thrive.    HISTORY OF PRESENT ILLNESS:  The patient is a 61-year-old female, morbidly   obese, had a left femur fracture on 12/18/2016, underwent repairment at that   time.  It was a ground-level fall and she has a prior history of hemorrhagic   stroke with chronic residual left-sided weakness.  She was discharged on the   21st and then spent several weeks at [facility] and was discharged from   [facility] on January 12th.  Patient over the last several days has just   been unable to take care of herself at home.  She denies any pain around the   actual surgical site, but has chronic pain in her feet.  She has had no way to   really move around.  She denies any nausea, vomiting, fevers or chills.  She   has a poor appetite and cannot really cook food on her own.  She finally came   in because she just clearly cannot take care of herself anymore.  She has no   other complaints.    REVIEW OF SYSTEMS:  All other systems reviewed are negative.    PAST MEDICAL HISTORY:  1.  Recent left femur fracture, status post repair in December 2016.  2.  Type 2 diabetes, diet controlled.  3.  Morbid obesity.  4.  Hypertension.  5.  Dyslipidemia.  6.  Chronic pain with peripheral neuropathy.  7.  GERD.  8.  History of intracranial spontaneous hemorrhage.  9.  Urinary incontinence.    PAST SURGICAL HISTORY:  1.  Tubal ligation.  2.  Open reduction and internal fixation of left supracondylar femur fracture   without intercondylar extension.    SOCIAL HISTORY:  No alcohol, tobacco or drugs.    FAMILY HISTORY:  Mother had esophageal cancer and hypertension.  Father had   non-Hodgkin's lymphoma.    ALLERGIES:  METFORMIN.    MEDICATIONS PRIOR TO ADMISSION:  1.  Lyrica 75 mg t.i.d.  2.  Ranitidine 300 mg b.i.d.  3.  Tramadol 50 mg every 8 hours as needed for pain.  4.  Trazodone 50 mg tablets at bedtime.  5.  Venlafaxine 75 mg tablets b.i.d., 150 mg in the morning and 75 mg at 1:00    p.m.    PHYSICAL EXAMINATION:  VITAL SIGNS:  Temperature is 97.2, heart rate 76, respiratory rate 18, oxygen   saturation 98% on room air, blood pressure 171/91.  GENERAL:  No acute distress, speaking in full sentences, A and O x4.  HEENT:  Normocephalic, atraumatic.  Pupils are equal and reactive to light.    Extraocular muscles intact.  Flat JVD.  CARDIOVASCULAR:  Regular rate and rhythm.  No murmurs, rubs, or gallops.    Nondisplaced PMI.  LUNGS:  Clear auscultation bilaterally.  No use of accessory muscles.  ABDOMEN:  Morbidly obese, soft, nontender, nondistended.  Normoactive bowel   sounds.  No hepatosplenomegaly, hard to appreciate given body habitus.  EXTREMITIES:  She has a large lateral surgical incision along the lateral   aspect of the left lower extremity that appears well healed.  No fluctuance or   dehiscence appreciated, otherwise nonfocal.  NEUROLOGICAL:  Intact sensation to soft touch throughout except for decreased   sensation in bilateral lower extremities ___.  MUSCULOSKELETAL:  Somewhat limited range of motion of the left lower extremity   secondary to pain, otherwise nonfocal.  SKIN:  No obvious rashes, lesions, or excoriations.  PSYCHOLOGICAL:  Appropriate affect, A and O x4.    LABORATORY DATA AND IMAGING:  CBC remarkable for white blood cell count 7.5, H   and H 11.1 and 35.9, platelet count 296.    CMP:  Sodium 136, potassium 4.0, BUN and creatinine is 11 and 0.76, AST and   ALT is 15 and 11, alkaline phosphatase is 101.    ASSESSMENT:  1.  Failure to thrive.  2.  Chronic debility.  3.  Morbid obesity, body mass index of 47.  4.  Recent left femur fracture in December 2016, status post repair.  5.  History of hemorrhagic stroke with chronic left-sided residual weakness.  6.  Dyslipidemia.  7.  Chronic neuropathy.  8.  Hypertension.  9.  Gastroesophageal reflux disease.  10.  Anemia.    PLAN:  The patient will be admitted to the medical unit at this time.  Patient   clearly demonstrates  she cannot take care of herself and still has quite   aggressive rehab needs.  We are going to work on trying to get her to [facility] in the morning.  Other than that, she is doing quite   well.  We will continue PT, OT here.  Continue all other outpatient   medication.  Monitor closely.    CODE STATUS:  Full code, full care.    DIET:  Regular.    DVT prophylaxis, heparin 5000 units every 8 hours.       ____________________________________     MD RICARDO PAREDES / NTS    DD:  01/18/2017 17:33:37  DT:  01/18/2017 18:15:51    D#:  [id number]  Job#:  [id number]

## 2017-01-19 NOTE — DISCHARGE PLANNING
USMAN Nguyen spoke with Nicole with admissions who stated they are waiting on records from [facility] so they can evaluate whether they can accept the pt.  USMAN informed KHUSHI Addison of status.

## 2017-01-19 NOTE — THERAPY
"Physical Therapy Evaluation completed.   Bed Mobility:  Supine to Sit: Stand by Assist  Transfers: Sit to Stand: Slide board transfer to bedside chair w/ min A.   Gait: Level Of Assist: Unable to Participate with Front-Wheel Walker (TTWB LLE until atleast Feb 6th).  Plan of Care: Will benefit from Physical Therapy 5 times per week  Discharge Recommendations: Equipment: Bariatric Drop Arm Bedside Commode and Will Continue to Assess for Equipment Needs. Post-acute therapy recommended before discharged home.    62 yo female admitted with FTT from home after recently being DC from [facility] to home with Home Health services. She was able to complete transfers w/ a slideboard but unable to manage lower body dressing to successfully complete toileting at home. Pt will benefit from further acute and post acute therapies prior to return to home. RN and CNA updated.     See \"Rehab Therapy-Acute\" Patient Summary Report for complete documentation.     "

## 2017-01-19 NOTE — DISCHARGE PLANNING
USMAN Nguyen received a call from [facility] who stated pt's 30 day coverage won't  until the  but will contact [facility] to see if they will accept today.  USMAN notified KHUSHI Addison.

## 2017-01-19 NOTE — DIETARY
NUTRITION SERVICES: BMI - Pt with BMI >40 (49.09). Weight loss counseling not appropriate in acute care setting. RECOMMEND - Referral to outpatient nutrition services for weight management after D/C.

## 2017-01-19 NOTE — PROGRESS NOTES
1800Report received, plan of care reviewed and discussed, assessment complete, oriented to room, bed alarm on, nonskid socks applied, advised to call for assistance.   Report given to next shift.

## 2017-01-19 NOTE — DISCHARGE PLANNING
USMAN received call from [facility] stating that this patient was coming into [facility] ER.  She stated that she was at [facility] and was discharge a week ago and has not been able to manage well at home.  Maryana would like to see her go to SNF.  USMAN met with this patient in the ER to complete choice form.  Patient requested the referral be sent to [facility]. USMAN requested a SNF order from the Doctor.

## 2017-01-19 NOTE — DISCHARGE PLANNING
USMAN Nguyen received a call from [facility] stating that [facility] can accept but needs a new Chest Xray as that  yesterday.  If the chest x-ray is done and read and d.c summary done tonight, then pt can be transferred there in the am tomorrow.  USMAN notified [facility] and KHUSHI Addison.

## 2017-01-19 NOTE — PROGRESS NOTES
Received report from day shift RN; care assumed. Pt sitting up in bed-- finished dinner and denies other needs. CLIP, pt calling for assistance, fall precautions in place (bed alarm set), will CTM.

## 2017-01-19 NOTE — DISCHARGE PLANNING
CCS Received a SNF choice form. The Current Clinical information from the ER and the Clinical information from the patient's previous stay has been faxed to [facility] as per the choice form. SW on floor Kayleigh has been notified that a SNF order will need to be entered.

## 2017-01-19 NOTE — CARE PLAN
Problem: Safety  Goal: Will remain free from falls  Outcome: PROGRESSING SLOWER THAN EXPECTED    Problem: Urinary Elimination:  Goal: Ability to reestablish a normal urinary elimination pattern will improve  Outcome: PROGRESSING AS EXPECTED    01/19/17 0204   OTHER   Urinary Elimination Stress Incontinence     Assisted to use bedpan.

## 2017-01-19 NOTE — PROGRESS NOTES
A&oX4, pleasant and cooperative. Denies any pain or discomforts. New PIV started on RAC. Pt denies any pain and refused lyrica tonight. Pt noted to have foul odor under pannus and breasts-- bed bath provided and assisted to dry folds. Pt did verbalize using nystatin powder at home-- will report. Pt denies needs. Call light in reach and BA is on.

## 2017-01-20 ENCOUNTER — HOME CARE VISIT (OUTPATIENT)
Dept: HOME HEALTH SERVICES | Facility: HOME HEALTHCARE | Age: 62
End: 2017-01-20
Payer: MEDICARE

## 2017-01-20 VITALS
BODY MASS INDEX: 45.99 KG/M2 | DIASTOLIC BLOOD PRESSURE: 72 MMHG | HEIGHT: 67 IN | OXYGEN SATURATION: 92 % | RESPIRATION RATE: 18 BRPM | WEIGHT: 293 LBS | TEMPERATURE: 98.2 F | SYSTOLIC BLOOD PRESSURE: 137 MMHG | HEART RATE: 65 BPM

## 2017-01-20 LAB
BACTERIA UR CULT: ABNORMAL
BACTERIA UR CULT: ABNORMAL
SIGNIFICANT IND 70042: ABNORMAL
SITE SITE: ABNORMAL
SOURCE SOURCE: ABNORMAL

## 2017-01-20 PROCEDURE — G0378 HOSPITAL OBSERVATION PER HR: HCPCS

## 2017-01-20 PROCEDURE — A9270 NON-COVERED ITEM OR SERVICE: HCPCS | Performed by: INTERNAL MEDICINE

## 2017-01-20 PROCEDURE — 665999 HH PPS REVENUE DEBIT

## 2017-01-20 PROCEDURE — 99217 PR OBSERVATION CARE DISCHARGE: CPT | Performed by: INTERNAL MEDICINE

## 2017-01-20 PROCEDURE — 700102 HCHG RX REV CODE 250 W/ 637 OVERRIDE(OP): Performed by: INTERNAL MEDICINE

## 2017-01-20 PROCEDURE — 700111 HCHG RX REV CODE 636 W/ 250 OVERRIDE (IP): Performed by: INTERNAL MEDICINE

## 2017-01-20 PROCEDURE — 665998 HH PPS REVENUE CREDIT

## 2017-01-20 PROCEDURE — 700112 HCHG RX REV CODE 229: Performed by: INTERNAL MEDICINE

## 2017-01-20 RX ADMIN — NYSTATIN 1 APPLICATION: 100000 POWDER TOPICAL at 08:25

## 2017-01-20 RX ADMIN — HEPARIN SODIUM 5000 UNITS: 5000 INJECTION, SOLUTION INTRAVENOUS; SUBCUTANEOUS at 06:13

## 2017-01-20 RX ADMIN — VENLAFAXINE HYDROCHLORIDE 150 MG: 75 CAPSULE, EXTENDED RELEASE ORAL at 08:24

## 2017-01-20 RX ADMIN — DOCUSATE SODIUM 100 MG: 100 CAPSULE, LIQUID FILLED ORAL at 08:25

## 2017-01-20 RX ADMIN — FAMOTIDINE 20 MG: 20 TABLET ORAL at 08:24

## 2017-01-20 RX ADMIN — PREGABALIN 75 MG: 25 CAPSULE ORAL at 08:23

## 2017-01-20 RX ADMIN — TRAMADOL HYDROCHLORIDE 100 MG: 50 TABLET, FILM COATED ORAL at 08:23

## 2017-01-20 NOTE — DISCHARGE PLANNING
CCS received a call back from [place] at [facility]. Transportation has been arranged to transfer the patient today at 1230 via [facility]. SW on floor  has been notified via voicemail.

## 2017-01-20 NOTE — PROGRESS NOTES
Pt sleeping with RR even and unlabored, but O2 sat is 66%-- placed on 2 lpm via NC and O2 sat up to 94%. Denies needs and no other changes in status. Call light is in reach.

## 2017-01-20 NOTE — DISCHARGE SUMMARY
CHIEF COMPLAINT ON ADMISSION  Chief Complaint   Patient presents with   • Failure to Thrive       CODE STATUS  Full Code    HPI & HOSPITAL COURSE  This is a 61 y.o. year old female here with inability to care for herself due to debility related to her recent left hip fracture and hospital stay. She was in a SNF getting PT and recently discharged with home health. However, while at home, she has been unable to care for herself, not able to get to the toilet and soiling herself, not able to make food for herself, therefore she returned to the hospital for help. She is otherwise medically stable and ready to go back to SNF for continued theapy.    Therefore, she is discharged in good and stable condition for further post-acute management.     SPECIFIC OUTPATIENT FOLLOW-UP  None    DISCHARGE PROBLEM LIST  Active Problems:    Debility POA: Yes    Failure to thrive in adult POA: Yes  Resolved Problems:    * No resolved hospital problems. *      FOLLOW UP  Future Appointments  Date Time Provider Department Center   1/20/2017 6:30 PM Sharlene Sampson C.N.A. [facility] None   1/23/2017 To Be Determined [facility] None   1/23/2017 To Be Determined Moira Sheth L.P.N. [facility] None   1/23/2017 4:00 PM CARE MANAGER [facility]   1/24/2017 3:00 PM KENNA Zuniga [facility]   1/25/2017 To Be Determined Moira Sheth L.P.N. [facility] None   1/26/2017 To Be Determined [facility] None   1/27/2017 To Be Determined Tracey Lewis R.N. [facility] None   1/30/2017 To Be Determined [facility] None   1/30/2017 To Be Determined Argentina Turner P.T. [facility] None   1/31/2017 To Be Determined Moira Sheth L.P.N. [facility] None   2/2/2017 To Be Determined [facility] None   2/2/2017 To Be Determined Argentina Turner P.T. [facility] None   2/2/2017 3:30 PM Joseph Penny M.D. [facility]   2/3/2017 To Be Determined Moira Sheth L.P.N. [facility] None   2/6/2017  To Be Determined Argentina Turner P.T. [facility] None   2/6/2017 To Be Determined [facility] None   2/6/2017 1:30 PM Joseph Penny M.D. [facility]   2/7/2017 To Be Determined Moira Sheth L.P.N. [facility] None   2/9/2017 To Be Determined [facility] None   2/9/2017 To Be Determined Argentina Turner P.T. [facility] None   2/10/2017 To Be Determined Moira Sheth L.P.N. [facility] None   2/13/2017 To Be Determined [facility] None   2/14/2017 To Be Determined Moira Sheth L.P.N. [facility] None   2/16/2017 To Be Determined [facility] None   2/17/2017 To Be Determined Miora Sheth L.P.N. [facility] None   2/20/2017 To Be Determined [facility] None   2/21/2017 To Be Determined Moira Sheth L.P.N. [facility] None   2/23/2017 To Be Determined [facility] None   2/24/2017 To Be Determined Moira Sheth L.P.N. [facility] None   2/27/2017 To Be Determined [facility] None   2/28/2017 To Be Determined Moira Sheth L.P.N. [facility] None   3/2/2017 To Be Determined [facility] None   3/3/2017 To Be Determined Tracey Lewis R.N. [facility] None   3/6/2017 To Be Determined [facility] None   3/7/2017 To Be Determined Moira Sheth L.P.N. [facility] None   3/9/2017 To Be Determined [facility] None   3/10/2017 To Be Determined Tracey Lewis R.N. [facility] None     Joseph Penny M.D.  [address] [address]  [phone]            MEDICATIONS ON DISCHARGE   Ariella Sanchez   Home Medication Instructions BANG:[id number]    Printed on:01/20/17 0827   Medication Information                      nystatin (MYCOSTATIN) Powder  Apply 1 Application to affected area(s) 3 times a day. Apply to breast and pannus folds             pregabalin (LYRICA) 75 MG Cap  Take 75 mg by mouth 3 times a day.             ranitidine (ZANTAC) 300 MG tablet  Take 300 mg by mouth 2  times daily, before breakfast and dinner.             tramadol (ULTRAM) 50 MG TABS  Take 50 mg by mouth every 8 hours as needed.             trazodone (DESYREL) 50 MG Tab  Take 1 Tab by mouth every bedtime.             Venlafaxine HCl 75 MG TABLET SR 24 HR  Take  mg by mouth 2 Times a Day. 150 mg every morning  75 mg at 1300                 DIET  Orders Placed This Encounter   Procedures   • Diet Order     Standing Status: Standing      Number of Occurrences: 1      Standing Expiration Date:      Order Specific Question:  Diet:     Answer:  Regular [1]       ACTIVITY  As tolerated and directed by skilled nursing.       MENTAL STATUS ON TRANSFER  Level of Consciousness: Alert  Orientation : Oriented x 4  Speech: Speech Clear    CONSULTATIONS  None    PROCEDURES  None    LABORATORY  Lab Results   Component Value Date/Time    SODIUM 136 01/18/2017 03:19 PM    POTASSIUM 4.0 01/18/2017 03:19 PM    CHLORIDE 105 01/18/2017 03:19 PM    CO2 23 01/18/2017 03:19 PM    GLUCOSE 140* 01/18/2017 03:19 PM    BUN 11 01/18/2017 03:19 PM    CREATININE 0.76 01/18/2017 03:19 PM        Lab Results   Component Value Date/Time    WBC 7.5 01/18/2017 03:19 PM    HEMOGLOBIN 11.1* 01/18/2017 03:19 PM    HEMATOCRIT 35.9* 01/18/2017 03:19 PM    PLATELET COUNT 296 01/18/2017 03:19 PM        Total time of the discharge process exceeds 36 minutes.

## 2017-01-20 NOTE — PROGRESS NOTES
During rounds, received orders to take IV out, increase her tramadol to 100mg and updating her status to inpatient.  Christa, ran her insurance and she has a qualifying stay as it has been 30 days since her transfer to snf so she does not need to be rolled inpatient.  PT at bedside about 1500 and had pt transfer to the cardiac chair.  Pt states that her nausea is gone now that she is up to the chair and that the sprite helped.  VSS.  Plan is to transfer to [facility] after d/c summary is done and received approval from her insurance.

## 2017-01-20 NOTE — PROGRESS NOTES
Received report from day shift RN; care assumed. Pt is watching tv in bed-- denies any current needs. CLIP, pt calling for assistance, fall precautions in place, will CTM.

## 2017-01-20 NOTE — PROGRESS NOTES
"Calm and cooperative tonight. Verbalized she was \"feeling queasy\" but has now improved after mobilizing in bed. Denies needs or any acute changes. Resting in bed with call light in reach-- monitoring.   "

## 2017-01-20 NOTE — CARE PLAN
Problem: Discharge Barriers/Planning  Goal: Patient’s continuum of care needs will be met  Intervention: Collaborate with Transitional Care Team and Interdisciplinary Team to meet discharge needs  Plan is to transfer to [facility] in am after d/c summary faxed.      Problem: Mobility  Goal: Risk for activity intolerance will decrease  Intervention: Provide rest periods  PT at bedside about 1500 and helped pt transfer to chair as she is non-wt bearing until after February 6th      Problem: Skin Integrity  Goal: Risk for impaired skin integrity will decrease  Intervention: Assess risk factors for impaired skin integrity and/or pressure ulcers  Pt having redness under her breast and pannus, nystatin ordered and she has received.

## 2017-01-20 NOTE — DISCHARGE PLANNING
USMAN Nguyen received d.c summary and contacted [facility] to try and arrange transport ASAP this morning.  USMAN faxed transport form requesting bariatric wheelchair and confirmed with RN Azael that pt can go via wheelchair van.

## 2017-01-20 NOTE — PROGRESS NOTES
Report received from janessa Koenig awake and engaging in report.  CNA helped transfer pt to the cardiac chair about 0730 using the slide board.  Morning medications and assessment done about 0815 and medicated with tramadol for her pain.  Pt states she feels much better today and the nausea is gone.  Spoke with Christa, , about 0830 and she was setting up transport.

## 2017-01-20 NOTE — PROGRESS NOTES
Hospital Medicine Progress Note, Adult, Complex               Author: Karen Fraser Date & Time created: 1/19/2017  9:45 PM     Cc - debility  Interval History:  Pt has weakness from recent surgery/hospital stay. Left SNF too early and unable to do basic needs at home. States her pain is controlled and has no new complaints at this time. Eating and drinking without problems. Awaiting approval back to SNF for continued PT/OT.    Review of Systems:  Review of Systems   Constitutional: Negative for fever and chills.   Respiratory: Negative for cough and shortness of breath.    Cardiovascular: Negative for chest pain and leg swelling.   Gastrointestinal: Negative for nausea, vomiting and abdominal pain.   Genitourinary: Negative for dysuria, urgency and frequency.   Musculoskeletal: Positive for joint pain.       Physical Exam:  Physical Exam   Constitutional: She is oriented to person, place, and time. She appears well-developed and well-nourished. No distress.   HENT:   Head: Normocephalic and atraumatic.   Mouth/Throat: Oropharynx is clear and moist.   Eyes: Conjunctivae and EOM are normal. Pupils are equal, round, and reactive to light. Right eye exhibits no discharge. Left eye exhibits no discharge. No scleral icterus.   Neck: Neck supple. No JVD present. No tracheal deviation present. No thyromegaly present.   Cardiovascular: Normal rate, regular rhythm and normal heart sounds.    Pulmonary/Chest: Effort normal and breath sounds normal. No respiratory distress. She has no wheezes. She has no rales.   Abdominal: Soft. Bowel sounds are normal. She exhibits no distension. There is no tenderness.   Musculoskeletal: She exhibits no edema.   Left hip old surgical scar healed   Lymphadenopathy:     She has no cervical adenopathy.        Right: No supraclavicular adenopathy present.        Left: No supraclavicular adenopathy present.   Neurological: She is alert and oriented to person, place, and time. No cranial  nerve deficit.   Skin: Skin is warm and dry. No rash noted. She is not diaphoretic.   Psychiatric: She has a normal mood and affect. Her behavior is normal.   Nursing note and vitals reviewed.      Labs:        Invalid input(s): [id number]      Recent Labs      17   1519   SODIUM  136   POTASSIUM  4.0   CHLORIDE  105   CO2  23   BUN  11   CREATININE  0.76   CALCIUM  8.7     Recent Labs      17   1519   ALTSGPT  11   ASTSGOT  15   ALKPHOSPHAT  101*   TBILIRUBIN  0.6   GLUCOSE  140*     Recent Labs      17   1519   RBC  3.91*   HEMOGLOBIN  11.1*   HEMATOCRIT  35.9*   PLATELETCT  296     Recent Labs      17   1519   WBC  7.5   NEUTSPOLYS  60.20   LYMPHOCYTES  31.00   MONOCYTES  7.70   EOSINOPHILS  0.30   BASOPHILS  0.40   ASTSGOT  15   ALTSGPT  11   ALKPHOSPHAT  101*   TBILIRUBIN  0.6           Hemodynamics:  Temp (24hrs), Av.1 °C (98.7 °F), Min:36.8 °C (98.3 °F), Max:37.4 °C (99.4 °F)  Temperature: 36.8 °C (98.3 °F)  Pulse  Av.6  Min: 72  Max: 87   Blood Pressure: 158/75 mmHg     Respiratory:    Respiration: 18, Pulse Oximetry: 93 %        RML Breath Sounds: Diminished, RLL Breath Sounds: Diminished, LLL Breath Sounds: Diminished  Fluids:    Intake/Output Summary (Last 24 hours) at 17 2145  Last data filed at 17 1800   Gross per 24 hour   Intake    840 ml   Output    700 ml   Net    140 ml     Weight: (!) 142.1 kg (313 lb 4.4 oz)  GI/Nutrition:  Orders Placed This Encounter   Procedures   • Diet Order     Standing Status: Standing      Number of Occurrences: 1      Standing Expiration Date:      Order Specific Question:  Diet:     Answer:  Regular [1]     Medical Decision Making, by Problem:  Active Hospital Problems    Diagnosis   • Debility [R53.81]   • Failure to thrive in adult [R62.7]     PT/OT  Awaiting SNF acceptance  Continue home meds    Core Measures

## 2017-01-20 NOTE — DISCHARGE PLANNING
Medical Social Work    Referral: Pt discussed at IDT rounds.    Intervention: Pt to go to [facility] today.    Plan: SW received confirmation that pt will be picked up at 1230 via [facility] to go to [facility].  SW notified medical staff and pt and will put COBRA on top of chart once completed.

## 2017-01-20 NOTE — CARE PLAN
Problem: Safety  Goal: Will remain free from falls  Outcome: PROGRESSING AS EXPECTED  Pt educated regarding the use of call light when needing assistance. Pt demonstrated and verbalized the proper use of a call light and to call for assistance. Fall precautions in place, treaded slippers on, personal belongings/phone in reach. Pt requires X1 assistance while in bed, but does require X2 person assistance when getting OOB.     Problem: Pain Management  Goal: Pain level will decrease to patient’s comfort goal  Outcome: PROGRESSING AS EXPECTED    01/19/17 9943   OTHER   Nurse Pain Scale 0 - 10  4     Per pt, Lyrica and PRN tramadol keep pain controlled and allow her to sleep.

## 2017-01-20 NOTE — DISCHARGE PLANNING
CCS received a transport form to arranged transportation to transfer the patient to [facility]. [facility]CS called and spoke to [place] at [facility] to arrange transport. CCS to follow.

## 2017-01-21 PROCEDURE — 665998 HH PPS REVENUE CREDIT

## 2017-01-21 PROCEDURE — 665999 HH PPS REVENUE DEBIT

## 2017-01-21 NOTE — PROGRESS NOTES
Report called to [facility] at 1130 and pt transferred with [facility] to [facility] at 1240.  Belongings sent with pt.

## 2017-01-22 PROCEDURE — 665998 HH PPS REVENUE CREDIT

## 2017-01-22 PROCEDURE — 665999 HH PPS REVENUE DEBIT

## 2017-01-23 ENCOUNTER — PATIENT OUTREACH (OUTPATIENT)
Dept: HEALTH INFORMATION MANAGEMENT | Facility: OTHER | Age: 62
End: 2017-01-23

## 2017-01-23 PROCEDURE — 665999 HH PPS REVENUE DEBIT

## 2017-01-23 PROCEDURE — 665998 HH PPS REVENUE CREDIT

## 2017-01-24 PROCEDURE — 665999 HH PPS REVENUE DEBIT

## 2017-01-24 PROCEDURE — 665998 HH PPS REVENUE CREDIT

## 2017-01-24 NOTE — PROGRESS NOTES
· Chart reviewed.  Patient was discharged to [facility] from [facility] on 1/20/17.  Care manager telephone visit scheduled for today at 4 PM and discharge clinic appt scheduled for 1/24 at 3 PM canceled.

## 2017-01-25 PROCEDURE — 665999 HH PPS REVENUE DEBIT

## 2017-01-25 PROCEDURE — 665998 HH PPS REVENUE CREDIT

## 2017-01-26 PROCEDURE — 665999 HH PPS REVENUE DEBIT

## 2017-01-26 PROCEDURE — 665998 HH PPS REVENUE CREDIT

## 2017-01-27 PROCEDURE — 665999 HH PPS REVENUE DEBIT

## 2017-01-27 PROCEDURE — 665998 HH PPS REVENUE CREDIT

## 2017-01-28 PROCEDURE — 665999 HH PPS REVENUE DEBIT

## 2017-01-28 PROCEDURE — 665998 HH PPS REVENUE CREDIT

## 2017-01-29 PROCEDURE — 665999 HH PPS REVENUE DEBIT

## 2017-01-29 PROCEDURE — 665998 HH PPS REVENUE CREDIT

## 2017-01-30 PROCEDURE — 665998 HH PPS REVENUE CREDIT

## 2017-01-30 PROCEDURE — 665999 HH PPS REVENUE DEBIT

## 2017-01-30 PROCEDURE — G0180 MD CERTIFICATION HHA PATIENT: HCPCS | Performed by: FAMILY MEDICINE

## 2017-01-31 PROCEDURE — 665998 HH PPS REVENUE CREDIT

## 2017-01-31 PROCEDURE — 665999 HH PPS REVENUE DEBIT

## 2017-02-01 ENCOUNTER — HOME HEALTH ADMISSION (OUTPATIENT)
Dept: HOME HEALTH SERVICES | Facility: HOME HEALTHCARE | Age: 62
End: 2017-02-01
Payer: MEDICARE

## 2017-02-01 PROCEDURE — 665999 HH PPS REVENUE DEBIT

## 2017-02-01 PROCEDURE — 665998 HH PPS REVENUE CREDIT

## 2017-02-02 PROCEDURE — 665999 HH PPS REVENUE DEBIT

## 2017-02-02 PROCEDURE — 665998 HH PPS REVENUE CREDIT

## 2017-02-03 PROCEDURE — 665999 HH PPS REVENUE DEBIT

## 2017-02-03 PROCEDURE — 665998 HH PPS REVENUE CREDIT

## 2017-02-04 PROCEDURE — 665998 HH PPS REVENUE CREDIT

## 2017-02-04 PROCEDURE — 665999 HH PPS REVENUE DEBIT

## 2017-02-05 PROCEDURE — 665999 HH PPS REVENUE DEBIT

## 2017-02-05 PROCEDURE — 665998 HH PPS REVENUE CREDIT

## 2017-02-06 PROCEDURE — 665999 HH PPS REVENUE DEBIT

## 2017-02-06 PROCEDURE — 665998 HH PPS REVENUE CREDIT

## 2017-02-07 PROCEDURE — 665998 HH PPS REVENUE CREDIT

## 2017-02-07 PROCEDURE — 665999 HH PPS REVENUE DEBIT

## 2017-02-08 PROCEDURE — 665998 HH PPS REVENUE CREDIT

## 2017-02-08 PROCEDURE — 665999 HH PPS REVENUE DEBIT

## 2017-02-09 PROCEDURE — 665998 HH PPS REVENUE CREDIT

## 2017-02-09 PROCEDURE — 665999 HH PPS REVENUE DEBIT

## 2017-02-10 PROCEDURE — 665999 HH PPS REVENUE DEBIT

## 2017-02-10 PROCEDURE — 665998 HH PPS REVENUE CREDIT

## 2017-02-10 NOTE — ADDENDUM NOTE
Encounter addended by: Cortney Green R.N. on: 2/10/2017 11:05 AM<BR>     Documentation filed: Inpatient Document Flowsheet

## 2017-02-11 PROCEDURE — 665999 HH PPS REVENUE DEBIT

## 2017-02-11 PROCEDURE — 665998 HH PPS REVENUE CREDIT

## 2017-02-12 PROCEDURE — 665999 HH PPS REVENUE DEBIT

## 2017-02-12 PROCEDURE — 665998 HH PPS REVENUE CREDIT

## 2017-02-13 PROCEDURE — 665999 HH PPS REVENUE DEBIT

## 2017-02-13 PROCEDURE — 665998 HH PPS REVENUE CREDIT

## 2017-02-14 PROCEDURE — 665999 HH PPS REVENUE DEBIT

## 2017-02-14 PROCEDURE — 665998 HH PPS REVENUE CREDIT

## 2017-02-15 PROCEDURE — 665999 HH PPS REVENUE DEBIT

## 2017-02-15 PROCEDURE — 665998 HH PPS REVENUE CREDIT

## 2017-02-16 PROCEDURE — 665999 HH PPS REVENUE DEBIT

## 2017-02-16 PROCEDURE — 665998 HH PPS REVENUE CREDIT

## 2017-02-17 PROCEDURE — 665999 HH PPS REVENUE DEBIT

## 2017-02-17 PROCEDURE — 665998 HH PPS REVENUE CREDIT

## 2017-02-18 PROCEDURE — 665998 HH PPS REVENUE CREDIT

## 2017-02-18 PROCEDURE — 665999 HH PPS REVENUE DEBIT

## 2017-02-19 PROCEDURE — 665998 HH PPS REVENUE CREDIT

## 2017-02-19 PROCEDURE — 665999 HH PPS REVENUE DEBIT

## 2017-02-20 PROCEDURE — 665999 HH PPS REVENUE DEBIT

## 2017-02-20 PROCEDURE — 665998 HH PPS REVENUE CREDIT

## 2017-02-21 PROCEDURE — 665999 HH PPS REVENUE DEBIT

## 2017-02-21 PROCEDURE — 665998 HH PPS REVENUE CREDIT

## 2017-02-22 PROCEDURE — 665998 HH PPS REVENUE CREDIT

## 2017-02-22 PROCEDURE — 665999 HH PPS REVENUE DEBIT

## 2017-02-23 PROCEDURE — 665999 HH PPS REVENUE DEBIT

## 2017-02-23 PROCEDURE — 665998 HH PPS REVENUE CREDIT

## 2017-02-24 PROCEDURE — 665999 HH PPS REVENUE DEBIT

## 2017-02-24 PROCEDURE — 665998 HH PPS REVENUE CREDIT

## 2017-02-25 PROCEDURE — 665999 HH PPS REVENUE DEBIT

## 2017-02-25 PROCEDURE — 665998 HH PPS REVENUE CREDIT

## 2017-02-26 PROCEDURE — 665998 HH PPS REVENUE CREDIT

## 2017-02-26 PROCEDURE — 665999 HH PPS REVENUE DEBIT

## 2017-02-27 PROCEDURE — 665999 HH PPS REVENUE DEBIT

## 2017-02-27 PROCEDURE — 665998 HH PPS REVENUE CREDIT

## 2017-02-28 PROCEDURE — 665998 HH PPS REVENUE CREDIT

## 2017-02-28 PROCEDURE — 665999 HH PPS REVENUE DEBIT

## 2017-03-01 PROCEDURE — 665999 HH PPS REVENUE DEBIT

## 2017-03-01 PROCEDURE — 665998 HH PPS REVENUE CREDIT

## 2017-03-02 PROCEDURE — 665999 HH PPS REVENUE DEBIT

## 2017-03-02 PROCEDURE — 665998 HH PPS REVENUE CREDIT

## 2017-03-03 PROCEDURE — 665998 HH PPS REVENUE CREDIT

## 2017-03-03 PROCEDURE — 665999 HH PPS REVENUE DEBIT

## 2017-03-04 PROCEDURE — 665998 HH PPS REVENUE CREDIT

## 2017-03-04 PROCEDURE — 665999 HH PPS REVENUE DEBIT

## 2017-03-05 PROCEDURE — 665998 HH PPS REVENUE CREDIT

## 2017-03-05 PROCEDURE — 665999 HH PPS REVENUE DEBIT

## 2017-03-06 PROCEDURE — 665998 HH PPS REVENUE CREDIT

## 2017-03-06 PROCEDURE — 665999 HH PPS REVENUE DEBIT

## 2017-03-07 PROCEDURE — 665999 HH PPS REVENUE DEBIT

## 2017-03-07 PROCEDURE — 665998 HH PPS REVENUE CREDIT

## 2017-04-10 ENCOUNTER — OFFICE VISIT (OUTPATIENT)
Dept: MEDICAL GROUP | Facility: MEDICAL CENTER | Age: 62
End: 2017-04-10
Attending: FAMILY MEDICINE
Payer: MEDICARE

## 2017-04-10 VITALS
BODY MASS INDEX: 45.99 KG/M2 | RESPIRATION RATE: 16 BRPM | TEMPERATURE: 98.1 F | HEIGHT: 67 IN | DIASTOLIC BLOOD PRESSURE: 92 MMHG | SYSTOLIC BLOOD PRESSURE: 140 MMHG | WEIGHT: 293 LBS | OXYGEN SATURATION: 98 % | HEART RATE: 80 BPM

## 2017-04-10 DIAGNOSIS — I10 ESSENTIAL HYPERTENSION: ICD-10-CM

## 2017-04-10 DIAGNOSIS — Z12.11 COLON CANCER SCREENING: ICD-10-CM

## 2017-04-10 DIAGNOSIS — Z79.891 CHRONIC USE OF OPIATE DRUGS THERAPEUTIC PURPOSES: ICD-10-CM

## 2017-04-10 DIAGNOSIS — Z12.31 SCREENING MAMMOGRAM, ENCOUNTER FOR: ICD-10-CM

## 2017-04-10 DIAGNOSIS — G89.4 CHRONIC PAIN SYNDROME: ICD-10-CM

## 2017-04-10 DIAGNOSIS — M62.81 LEFT-SIDED MUSCLE WEAKNESS: ICD-10-CM

## 2017-04-10 DIAGNOSIS — E66.01 MORBID OBESITY WITH BMI OF 40.0-44.9, ADULT (HCC): ICD-10-CM

## 2017-04-10 DIAGNOSIS — E11.8 DM (DIABETES MELLITUS) WITH COMPLICATIONS (HCC): ICD-10-CM

## 2017-04-10 DIAGNOSIS — Z78.9 IMPAIRED MOBILITY AND ADLS: ICD-10-CM

## 2017-04-10 DIAGNOSIS — Z74.09 IMPAIRED MOBILITY AND ADLS: ICD-10-CM

## 2017-04-10 DIAGNOSIS — R32 INCONTINENCE IN FEMALE: ICD-10-CM

## 2017-04-10 PROCEDURE — 99213 OFFICE O/P EST LOW 20 MIN: CPT | Performed by: FAMILY MEDICINE

## 2017-04-10 PROCEDURE — G8432 DEP SCR NOT DOC, RNG: HCPCS | Performed by: FAMILY MEDICINE

## 2017-04-10 PROCEDURE — 3046F HEMOGLOBIN A1C LEVEL >9.0%: CPT | Mod: 8P | Performed by: FAMILY MEDICINE

## 2017-04-10 PROCEDURE — 3014F SCREEN MAMMO DOC REV: CPT | Performed by: FAMILY MEDICINE

## 2017-04-10 PROCEDURE — 99214 OFFICE O/P EST MOD 30 MIN: CPT | Performed by: FAMILY MEDICINE

## 2017-04-10 PROCEDURE — 1036F TOBACCO NON-USER: CPT | Performed by: FAMILY MEDICINE

## 2017-04-10 PROCEDURE — G8419 CALC BMI OUT NRM PARAM NOF/U: HCPCS | Performed by: FAMILY MEDICINE

## 2017-04-10 RX ORDER — TRAMADOL HYDROCHLORIDE 50 MG/1
50 TABLET ORAL EVERY 8 HOURS PRN
Qty: 30 TAB | Refills: 60 | Status: SHIPPED | OUTPATIENT
Start: 2017-04-10 | End: 2021-09-19

## 2017-04-10 ASSESSMENT — ENCOUNTER SYMPTOMS
PALPITATIONS: 0
DEPRESSION: 1
HEADACHES: 0
COUGH: 0
CHILLS: 0
VOMITING: 0
TINGLING: 1
FOCAL WEAKNESS: 1
SENSORY CHANGE: 1
NAUSEA: 0
BACK PAIN: 1
EYES NEGATIVE: 1
ABDOMINAL PAIN: 0
TREMORS: 0
FEVER: 0
SHORTNESS OF BREATH: 0
SPEECH CHANGE: 0

## 2017-04-10 ASSESSMENT — LIFESTYLE VARIABLES: HISTORY_ALCOHOL_USE: 0

## 2017-04-10 ASSESSMENT — PAIN SCALES - GENERAL: PAINLEVEL: 5=MODERATE PAIN

## 2017-04-10 NOTE — MR AVS SNAPSHOT
"Ariella Sanchez   4/10/2017 8:30 AM   Office Visit   MRN: [id number]    Department:  [facility]   Dept Phone:  [phone]    Description:  Female : 1955   Provider:  Joseph Penny M.D.           Reason for Visit     Follow-Up ref needed      Allergies as of 4/10/2017     Allergen Noted Reactions    Metformin 10/18/2011   Nausea    GI upset. No hypersensitivity symptoms.   RXN=unknown      You were diagnosed with     Incontinence in female   [2523451]       Morbid obesity with BMI of 40.0-44.9, adult (CMS-HCC)   [791679]       Left-sided muscle weakness   [179249]       Impaired mobility and ADLs   [500303]       Chronic pain syndrome   [338.4.ICD-9-CM]   L leg pain and pain in knee    Chronic use of opiate drugs therapeutic purposes   [9361058]       DM (diabetes mellitus) with complications (CMS-HCC)   [364218]       Colon cancer screening   [768162]       Screening mammogram, encounter for   [2943810]         Vital Signs     Blood Pressure Pulse Temperature Respirations Height Weight    140/92 mmHg 80 36.7 °C (98.1 °F) 16 1.702 m (5' 7.01\") 136.079 kg (300 lb)    Body Mass Index Oxygen Saturation Smoking Status             46.98 kg/m2 98% Never Smoker          Basic Information     Date Of Birth Sex Race Ethnicity Preferred Language    1955 Female White Non- English      Your appointments     May 10, 2017  8:50 AM   Established Patient with Joseph Penny M.D.   [facility] ([facility]    [address] [place] [id number]   [phone]           You will be receiving a confirmation call a few days before your appointment from our automated call confirmation system.              Problem List              ICD-10-CM Priority Class Noted - Resolved    Spontaneous intraparenchymal intracranial hemorrhage, acute (CMS-HCC) I62.9   2011 - Present    Hypertensive emergency without congestive heart failure I16.1   2011 - Present    Obesity E66.9   2011 - " Present    Hyperglycemia R73.9   5/17/2011 - Present    Incontinence R32   7/26/2011 - Present    Left-sided muscle weakness M62.81   7/26/2011 - Present    GERD (gastroesophageal reflux disease) K21.9   8/30/2011 - Present    DM (diabetes mellitus) (CMS-HCC) E11.9   8/30/2011 - Present    Depression F32.9   11/15/2011 - Present    HTN (hypertension) I10   4/30/2012 - Present    Lump in neck R22.1   4/30/2012 - Present    Hypercholesteremia E78.00   6/12/2012 - Present    Chronic pain G89.29   7/23/2012 - Present    Type 2 diabetes, diet controlled (CMS-HCC) E11.9   3/26/2013 - Present    Impaired mobility and ADLs Z74.09   9/24/2015 - Present    History of spontaneous intraventricular intracranial hemorrhage Z86.79   12/3/2015 - Present    Femur fracture, left (CMS-HCC) S72.92XA   12/18/2016 - Present    Dyslipidemia E78.5   12/19/2016 - Present    Neuropathy (CMS-HCC) G62.9   12/19/2016 - Present    Mood disorder (CMS-HCC) F39   1/17/2017 - Present    Morbid obesity with BMI of 40.0-44.9, adult ([facility]) E66.01, Z68.41   1/17/2017 - Present    Debility R53.81   1/18/2017 - Present    Failure to thrive in adult R62.7   1/18/2017 - Present      Health Maintenance        Date Due Completion Dates    IMM DTaP/Tdap/Td Vaccine (1 - Tdap) 9/22/1972 ---    IMM PNEUMOCOCCAL 19-64 (ADULT) MEDIUM RISK SERIES (1 of 1 - PPSV23) 9/22/1972 ---    COLONOSCOPY 9/22/2003 ---    IMM ZOSTER VACCINE 9/22/2013 ---    MAMMOGRAM 5/16/2015 5/16/2014    RETINAL SCREENING 12/1/2015 12/1/2014 (Done)    Override on 12/1/2014: Done (cataracts removed)    DIABETES MONOFILAMENT / LE EXAM 4/1/2016 4/1/2015 (Done)    Override on 4/1/2015: Done (pt referred to podiatry)    FASTING LIPID PROFILE 6/17/2016 6/17/2015, 6/19/2014, 12/23/2013, 3/11/2013, 7/23/2012, 5/30/2012    URINE ACR / MICROALBUMIN 6/17/2016 6/17/2015, 6/14/2014    PAP SMEAR 6/6/2017 6/6/2014    A1C SCREENING 6/20/2017 12/20/2016, 6/17/2015, 6/19/2014, 12/23/2013, 3/11/2013, 5/30/2012     SERUM CREATININE 1/18/2018 1/18/2017, 12/22/2016, 12/20/2016, 12/19/2016, 12/18/2016, 6/17/2015, 6/19/2014, 12/23/2013, 3/11/2013, 7/23/2012, 5/30/2012, 5/18/2011, 5/17/2011, 5/16/2011, 5/15/2011, 5/14/2011, 5/13/2011, 5/12/2011, 5/11/2011, 5/10/2011            Current Immunizations     Influenza TIV (IM) 11/6/2012  8:28 AM    Influenza Vaccine Quad Inj (Pf) 12/20/2016 11:01 PM      Below and/or attached are the medications your provider expects you to take. Review all of your home medications and newly ordered medications with your provider and/or pharmacist. Follow medication instructions as directed by your provider and/or pharmacist. Please keep your medication list with you and share with your provider. Update the information when medications are discontinued, doses are changed, or new medications (including over-the-counter products) are added; and carry medication information at all times in the event of emergency situations     Allergies:  METFORMIN - Nausea               Medications  Valid as of: April 10, 2017 -  8:50 AM    Generic Name Brand Name Tablet Size Instructions for use    Misc. Devices (Misc) Misc. Devices  Adult incontinence briefs to use as needed #90        Pregabalin (Cap) LYRICA 75 MG Take 75 mg by mouth 3 times a day.        RaNITidine HCl (Tab) ZANTAC 300 MG Take 300 mg by mouth 2 times daily, before breakfast and dinner.        TraMADol HCl (Tab) ULTRAM 50 MG Take 1 Tab by mouth every 8 hours as needed.        TraZODone HCl (Tab) DESYREL 50 MG Take 1 Tab by mouth every bedtime.        Venlafaxine HCl (TABLET SR 24 HR) Venlafaxine HCl 75 MG Take  mg by mouth 2 Times a Day. 150 mg every morning  75 mg at 1300        .                 Medicines prescribed today were sent to:     [facility][id number] - [place] - [address]    [address] [place] [address]    Phone: [phone] Fax: [phone]    Open 24 Hours?: No    [facility] - [place], [place] - [address]     [address] [place] [address] [place] [address]    Phone: [phone] Fax: [phone]    Open 24 Hours?: No      Medication refill instructions:       If your prescription bottle indicates you have medication refills left, it is not necessary to call your provider’s office. Please contact your pharmacy and they will refill your medication.    If your prescription bottle indicates you do not have any refills left, you may request refills at any time through one of the following ways: The online [facility] system (except Urgent Care), by calling your provider’s office, or by asking your pharmacy to contact your provider’s office with a refill request. Medication refills are processed only during regular business hours and may not be available until the next business day. Your provider may request additional information or to have a follow-up visit with you prior to refilling your medication.   *Please Note: Medication refills are assigned a new Rx number when refilled electronically. Your pharmacy may indicate that no refills were authorized even though a new prescription for the same medication is available at the pharmacy. Please request the medicine by name with the pharmacy before contacting your provider for a refill.        Your To Do List     Future Labs/Procedures Complete By Expires    COMP METABOLIC PANEL  As directed 4/10/2018    HEMOGLOBIN A1C (For A1C Every 6 Months Topic)  As directed 4/10/2018    Comments:    HEMOGLOBIN A1C   [unfilled]    LIPID PROFILE  As directed 4/10/2018    LIPID PROFILE  As directed 4/10/2018    MICROALBUMIN CREAT RATIO URINE  As directed 4/10/2018      Referral     A referral request has been sent to our patient care coordination department. Please allow 3-5 business days for us to process this request and contact you either by phone or mail. If you do not hear from us by the 5th business day, please call us at [phone].           [facility] Access Code: Activation code not  generated  Current MyChart Status: Active

## 2017-04-10 NOTE — PROGRESS NOTES
Subjective:      Ariella Sanchez is a 61 y.o. female who presents with Follow-Up            HPI Comments: Patient here for follow-up of her decreased IADLs, urinary incontinence, diabetes, past CVA with left-sided weakness, hyperlipidemia, chronic pain, and morbid obesity.    Patient states she has been having worsening urinary incontinence due to the inability to hold her urine as well as her decreased mobility. She states that since she has chronic left-sided knee and leg pain as well as the left-sided weakness secondary to her past CVA, she has been having increased difficulty making it to the bathroom in time. We'll have her continue to take her medication as directed as well as use her adult incontinence supplies as directed. Will refill her adult incontinence supplies consented to her pharmacy. Patient had been seen by a urologist in the past but does not remember the doctor she has seen. She is requesting another referral to urology for a further assessment of her incontinence. We'll continue to follow.    She will need a refill of her tramadol today. She states she has been taking it no more than 2-3 times in a day. The medication does help her function little more easily during the day with her left-sided leg and knee pain. She states that it has not caused her any problems and that it does help relieve her pain. Will order a NARxcheck and pt has been advised that at some time we will be doing a UDS to assess her appropriate use.     Due to her immobility and her difficulty performing her activities of daily living, she is requesting assistance from home health. She has a physical therapist visiting her to help her with her strengthening of her left side but has been having difficulty performing her other daily tasks. Will refer patient to home health for assessment for her needs. She does have a power wheelchair which does help her move around her home as well as getting around to patient she needs to be.  "We'll continue to follow.    Her blood pressure was slightly elevated today. If her blood pressure is elevated at her next appt will consider starting a blood pressure medication at low dose. Her prior blood pressure was within normal limits. Will have her also continue to monitor her blood pressure at home and keep notes. ER precautions given to pt.     Discussed the results of her recent blood work. Her hemoglobin and hematocrit improved during the last CBC. She is still slightly anemic so we'll recheck her CBC to reassess her hemoglobin and hematocrit as well as her other blood cells. We'll continue to follow.    Her past hemoglobin A1c was 6.0, her blood sugar was still elevated at 140s during her last fasting blood work. We'll have her continue to manage her diet, and exercise as tolerated although her immobility decreases her ability to efficiently exercise. Also have her attempt check her feet daily for any skin changes or changes in sensation. Discussed an ophthalmology referral for a yearly retinal exam. Patient wishes to hold off on this until her next visit due to the referrals she still has to get done.      Current medications, allergies, and problem list reviewed with patient and updated in EPIC.        Review of Systems   Constitutional: Negative for fever and chills.   HENT: Negative for hearing loss.    Eyes: Negative.    Respiratory: Negative for cough and shortness of breath.    Cardiovascular: Negative for chest pain and palpitations.   Gastrointestinal: Negative for nausea, vomiting and abdominal pain.   Genitourinary:        Incontinence   Musculoskeletal: Positive for back pain and joint pain.   Neurological: Positive for tingling, sensory change and focal weakness. Negative for tremors, speech change and headaches.          Objective:     /92 mmHg  Pulse 80  Temp(Src) 36.7 °C (98.1 °F)  Resp 16  Ht 1.702 m (5' 7.01\")  Wt 136.079 kg (300 lb)  BMI 46.98 kg/m2  SpO2 98%     Physical " Exam   Constitutional:   BMI > 46   HENT:   Right Ear: External ear normal.   Left Ear: External ear normal.   Nose: Nose normal.   Mouth/Throat: Oropharynx is clear and moist.   Eyes: EOM are normal. Pupils are equal, round, and reactive to light.   Neck: Normal range of motion.   Cardiovascular: Normal rate, regular rhythm and normal heart sounds.  Exam reveals no friction rub.    No murmur heard.  Pulmonary/Chest: Breath sounds normal. No respiratory distress. She has no wheezes. She has no rales.   Abdominal: Soft. Bowel sounds are normal.   Musculoskeletal:   Decreased ROM of L side in power wheelchair   Neurological: She is alert.   Skin: Skin is warm and dry.   Psychiatric: Her behavior is normal.               Assessment/Plan:     1. Incontinence in female  Re ordered her adult incontinence supplies to be used as needed. Patient also requested a referral to urology for assistance in further management of her worsening incontinence. Home health also requested for her impaired activities of daily living.  - REFERRAL TO UROLOGY  - REFERRAL TO HOME HEALTH  - Misc. Devices Misc; Adult incontinence briefs to use as needed #90  Dispense: 90 Device; Refill: 6    2. Morbid obesity with BMI of 40.0-44.9, adult ([facility])  She has been advised to increase the fibers in his diet, avoid fatty/fried foods and try fish oil supplements if not allergic to seafood. Also advised to exercise as tolerated.     - REFERRAL TO HOME HEALTH    3. Left-sided muscle weakness  See above plan.  - REFERRAL TO HOME HEALTH    4. Impaired mobility and ADLs  See above plan, referral made to home health for assistance in management of her decreased IADLs.  - REFERRAL TO HOME HEALTH    5. Chronic pain syndrome  Will have her continue to use her pain medication as directed, will refill today. She is still not established with pain management, will continue to follow.   - REFERRAL TO HOME HEALTH    6. Chronic use of opiate drugs therapeutic  purposes  NARxcandrae done, will have her get a UDS at her next visit. Discussed pain management for additional assistance in managing her chronic pain.    7. DM (diabetes mellitus) with complications (CMS-HCC)  Will reorder blood work to check her A1c, microalbumin creatinine ratio and a lipid panel. Discussed diet and exercise to further manage her elevated BMI as well as her blood sugar and lipid panels. We'll continue to follow.  - COMP METABOLIC PANEL; Future  - LIPID PROFILE; Future  - LIPID PROFILE; Future  - MICROALBUMIN CREAT RATIO URINE; Future  - HEMOGLOBIN A1C (For A1C Every 6 Months Topic); Future    8. Colon cancer screening  Will order a FIT screen for colon cancer screening. She will be referred for a colonoscopy if positive.   - REFERRAL TO GI FOR COLONOSCOPY    9. Screening mammogram, encounter for  Patient has not had a screening mammogram in several years per patient so a mammogram will be ordered for her to get done. Will continue to follow.  - MA-SCREEN MAMMO W/CAD-BILAT

## 2017-06-22 RX ORDER — RANITIDINE 300 MG/1
300 TABLET ORAL
Qty: 60 TAB | Refills: 1 | Status: SHIPPED | OUTPATIENT
Start: 2017-06-22 | End: 2017-06-23 | Stop reason: SDUPTHER

## 2017-11-16 ENCOUNTER — OFFICE VISIT (OUTPATIENT)
Dept: BEHAVIORAL HEALTH | Facility: PHYSICIAN GROUP | Age: 62
End: 2017-11-16
Payer: MEDICARE

## 2017-11-16 DIAGNOSIS — F39 MOOD DISORDER (HCC): ICD-10-CM

## 2017-11-16 PROCEDURE — 90791 PSYCH DIAGNOSTIC EVALUATION: CPT | Performed by: SOCIAL WORKER
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
## 2018-07-16 ENCOUNTER — APPOINTMENT (OUTPATIENT)
Dept: BEHAVIORAL HEALTH | Facility: PHYSICIAN GROUP | Age: 63
End: 2018-07-16
Payer: MEDICARE

## 2021-03-03 DIAGNOSIS — Z23 NEED FOR VACCINATION: ICD-10-CM

## 2021-09-19 ENCOUNTER — HOSPITAL ENCOUNTER (INPATIENT)
Facility: MEDICAL CENTER | Age: 66
LOS: 3 days | DRG: 065 | End: 2021-09-22
Attending: EMERGENCY MEDICINE | Admitting: INTERNAL MEDICINE
Payer: MEDICARE

## 2021-09-19 ENCOUNTER — APPOINTMENT (OUTPATIENT)
Dept: RADIOLOGY | Facility: MEDICAL CENTER | Age: 66
DRG: 065 | End: 2021-09-19
Attending: EMERGENCY MEDICINE
Payer: MEDICARE

## 2021-09-19 ENCOUNTER — APPOINTMENT (OUTPATIENT)
Dept: CARDIOLOGY | Facility: MEDICAL CENTER | Age: 66
DRG: 065 | End: 2021-09-19
Attending: INTERNAL MEDICINE
Payer: MEDICARE

## 2021-09-19 DIAGNOSIS — I63.9 CEREBROVASCULAR ACCIDENT (CVA), UNSPECIFIED MECHANISM (HCC): ICD-10-CM

## 2021-09-19 DIAGNOSIS — I16.0 HYPERTENSIVE URGENCY: ICD-10-CM

## 2021-09-19 DIAGNOSIS — R53.1 LEFT-SIDED WEAKNESS: ICD-10-CM

## 2021-09-19 PROBLEM — Z71.89 ADVANCE CARE PLANNING: Status: ACTIVE | Noted: 2021-09-19

## 2021-09-19 PROBLEM — Z91.199 NON-COMPLIANT PATIENT: Status: ACTIVE | Noted: 2021-09-19

## 2021-09-19 LAB
ABO GROUP BLD: NORMAL
ALBUMIN SERPL BCP-MCNC: 4 G/DL (ref 3.2–4.9)
ALBUMIN/GLOB SERPL: 1.3 G/DL
ALP SERPL-CCNC: 88 U/L (ref 30–99)
ALT SERPL-CCNC: 6 U/L (ref 2–50)
ANION GAP SERPL CALC-SCNC: 11 MMOL/L (ref 7–16)
APPEARANCE UR: ABNORMAL
APTT PPP: 29.6 SEC (ref 24.7–36)
AST SERPL-CCNC: 12 U/L (ref 12–45)
BACTERIA #/AREA URNS HPF: ABNORMAL /HPF
BASOPHILS # BLD AUTO: 0.4 % (ref 0–1.8)
BASOPHILS # BLD: 0.03 K/UL (ref 0–0.12)
BILIRUB SERPL-MCNC: 0.7 MG/DL (ref 0.1–1.5)
BILIRUB UR QL STRIP.AUTO: NEGATIVE
BLD GP AB SCN SERPL QL: NORMAL
BUN SERPL-MCNC: 12 MG/DL (ref 8–22)
CALCIUM SERPL-MCNC: 9.1 MG/DL (ref 8.5–10.5)
CHLORIDE SERPL-SCNC: 100 MMOL/L (ref 96–112)
CO2 SERPL-SCNC: 24 MMOL/L (ref 20–33)
COLOR UR: YELLOW
CREAT SERPL-MCNC: 0.74 MG/DL (ref 0.5–1.4)
EKG IMPRESSION: NORMAL
EOSINOPHIL # BLD AUTO: 0.17 K/UL (ref 0–0.51)
EOSINOPHIL NFR BLD: 2.5 % (ref 0–6.9)
EPI CELLS #/AREA URNS HPF: NEGATIVE /HPF
ERYTHROCYTE [DISTWIDTH] IN BLOOD BY AUTOMATED COUNT: 40.7 FL (ref 35.9–50)
EST. AVERAGE GLUCOSE BLD GHB EST-MCNC: 154 MG/DL
GLOBULIN SER CALC-MCNC: 3.2 G/DL (ref 1.9–3.5)
GLUCOSE BLD-MCNC: 149 MG/DL (ref 65–99)
GLUCOSE SERPL-MCNC: 173 MG/DL (ref 65–99)
GLUCOSE UR STRIP.AUTO-MCNC: NEGATIVE MG/DL
HBA1C MFR BLD: 7 % (ref 4–5.6)
HCT VFR BLD AUTO: 44.6 % (ref 37–47)
HGB BLD-MCNC: 14.7 G/DL (ref 12–16)
HYALINE CASTS #/AREA URNS LPF: ABNORMAL /LPF
IMM GRANULOCYTES # BLD AUTO: 0.03 K/UL (ref 0–0.11)
IMM GRANULOCYTES NFR BLD AUTO: 0.4 % (ref 0–0.9)
INR PPP: 1.2 (ref 0.87–1.13)
KETONES UR STRIP.AUTO-MCNC: NEGATIVE MG/DL
LEUKOCYTE ESTERASE UR QL STRIP.AUTO: NEGATIVE
LV EJECT FRACT  99904: 60
LV EJECT FRACT MOD 2C 99903: 56.1
LV EJECT FRACT MOD 4C 99902: 61.42
LV EJECT FRACT MOD BP 99901: 60.61
LYMPHOCYTES # BLD AUTO: 1.65 K/UL (ref 1–4.8)
LYMPHOCYTES NFR BLD: 23.9 % (ref 22–41)
MCH RBC QN AUTO: 29.9 PG (ref 27–33)
MCHC RBC AUTO-ENTMCNC: 33 G/DL (ref 33.6–35)
MCV RBC AUTO: 90.8 FL (ref 81.4–97.8)
MICRO URNS: ABNORMAL
MONOCYTES # BLD AUTO: 0.36 K/UL (ref 0–0.85)
MONOCYTES NFR BLD AUTO: 5.2 % (ref 0–13.4)
NEUTROPHILS # BLD AUTO: 4.67 K/UL (ref 2–7.15)
NEUTROPHILS NFR BLD: 67.6 % (ref 44–72)
NITRITE UR QL STRIP.AUTO: POSITIVE
NRBC # BLD AUTO: 0 K/UL
NRBC BLD-RTO: 0 /100 WBC
PH UR STRIP.AUTO: 7.5 [PH] (ref 5–8)
PLATELET # BLD AUTO: 260 K/UL (ref 164–446)
PMV BLD AUTO: 11.1 FL (ref 9–12.9)
POTASSIUM SERPL-SCNC: 4.5 MMOL/L (ref 3.6–5.5)
PROT SERPL-MCNC: 7.2 G/DL (ref 6–8.2)
PROT UR QL STRIP: NEGATIVE MG/DL
PROTHROMBIN TIME: 14.8 SEC (ref 12–14.6)
RBC # BLD AUTO: 4.91 M/UL (ref 4.2–5.4)
RBC # URNS HPF: ABNORMAL /HPF
RBC UR QL AUTO: ABNORMAL
RH BLD: NORMAL
SODIUM SERPL-SCNC: 135 MMOL/L (ref 135–145)
SP GR UR STRIP.AUTO: 1.03
TROPONIN T SERPL-MCNC: 11 NG/L (ref 6–19)
UROBILINOGEN UR STRIP.AUTO-MCNC: 1 MG/DL
WBC # BLD AUTO: 6.9 K/UL (ref 4.8–10.8)
WBC #/AREA URNS HPF: ABNORMAL /HPF

## 2021-09-19 PROCEDURE — 0042T CT-CEREBRAL PERFUSION ANALYSIS: CPT

## 2021-09-19 PROCEDURE — A9270 NON-COVERED ITEM OR SERVICE: HCPCS | Performed by: NURSE PRACTITIONER

## 2021-09-19 PROCEDURE — 93005 ELECTROCARDIOGRAM TRACING: CPT

## 2021-09-19 PROCEDURE — 93306 TTE W/DOPPLER COMPLETE: CPT | Mod: 26 | Performed by: INTERNAL MEDICINE

## 2021-09-19 PROCEDURE — 86900 BLOOD TYPING SEROLOGIC ABO: CPT

## 2021-09-19 PROCEDURE — 85610 PROTHROMBIN TIME: CPT

## 2021-09-19 PROCEDURE — 99285 EMERGENCY DEPT VISIT HI MDM: CPT

## 2021-09-19 PROCEDURE — 86850 RBC ANTIBODY SCREEN: CPT

## 2021-09-19 PROCEDURE — 85730 THROMBOPLASTIN TIME PARTIAL: CPT

## 2021-09-19 PROCEDURE — 700102 HCHG RX REV CODE 250 W/ 637 OVERRIDE(OP): Performed by: NURSE PRACTITIONER

## 2021-09-19 PROCEDURE — 93306 TTE W/DOPPLER COMPLETE: CPT

## 2021-09-19 PROCEDURE — 83036 HEMOGLOBIN GLYCOSYLATED A1C: CPT

## 2021-09-19 PROCEDURE — A9270 NON-COVERED ITEM OR SERVICE: HCPCS | Performed by: INTERNAL MEDICINE

## 2021-09-19 PROCEDURE — 84484 ASSAY OF TROPONIN QUANT: CPT

## 2021-09-19 PROCEDURE — 70450 CT HEAD/BRAIN W/O DYE: CPT | Mod: MG

## 2021-09-19 PROCEDURE — 700117 HCHG RX CONTRAST REV CODE 255: Performed by: INTERNAL MEDICINE

## 2021-09-19 PROCEDURE — 770001 HCHG ROOM/CARE - MED/SURG/GYN PRIV*

## 2021-09-19 PROCEDURE — 71045 X-RAY EXAM CHEST 1 VIEW: CPT

## 2021-09-19 PROCEDURE — 700102 HCHG RX REV CODE 250 W/ 637 OVERRIDE(OP): Performed by: INTERNAL MEDICINE

## 2021-09-19 PROCEDURE — 99223 1ST HOSP IP/OBS HIGH 75: CPT | Performed by: PSYCHIATRY & NEUROLOGY

## 2021-09-19 PROCEDURE — 93005 ELECTROCARDIOGRAM TRACING: CPT | Performed by: EMERGENCY MEDICINE

## 2021-09-19 PROCEDURE — 94760 N-INVAS EAR/PLS OXIMETRY 1: CPT

## 2021-09-19 PROCEDURE — 86901 BLOOD TYPING SEROLOGIC RH(D): CPT

## 2021-09-19 PROCEDURE — 80053 COMPREHEN METABOLIC PANEL: CPT

## 2021-09-19 PROCEDURE — 85025 COMPLETE CBC W/AUTO DIFF WBC: CPT

## 2021-09-19 PROCEDURE — 99223 1ST HOSP IP/OBS HIGH 75: CPT | Mod: AI | Performed by: INTERNAL MEDICINE

## 2021-09-19 PROCEDURE — 700101 HCHG RX REV CODE 250: Performed by: EMERGENCY MEDICINE

## 2021-09-19 PROCEDURE — 96374 THER/PROPH/DIAG INJ IV PUSH: CPT

## 2021-09-19 PROCEDURE — 700117 HCHG RX CONTRAST REV CODE 255: Performed by: EMERGENCY MEDICINE

## 2021-09-19 PROCEDURE — 70498 CT ANGIOGRAPHY NECK: CPT | Mod: MG

## 2021-09-19 PROCEDURE — 70496 CT ANGIOGRAPHY HEAD: CPT | Mod: MG

## 2021-09-19 PROCEDURE — 81001 URINALYSIS AUTO W/SCOPE: CPT

## 2021-09-19 PROCEDURE — 770020 HCHG ROOM/CARE - TELE (206)

## 2021-09-19 PROCEDURE — 82962 GLUCOSE BLOOD TEST: CPT

## 2021-09-19 RX ORDER — ASPIRIN 325 MG
325 TABLET ORAL DAILY
Status: DISCONTINUED | OUTPATIENT
Start: 2021-09-19 | End: 2021-09-20

## 2021-09-19 RX ORDER — IBUPROFEN 200 MG
200 TABLET ORAL EVERY 6 HOURS PRN
Status: ON HOLD | COMMUNITY
End: 2021-09-21

## 2021-09-19 RX ORDER — HYDRALAZINE HYDROCHLORIDE 20 MG/ML
10 INJECTION INTRAMUSCULAR; INTRAVENOUS
Status: DISCONTINUED | OUTPATIENT
Start: 2021-09-19 | End: 2021-09-20

## 2021-09-19 RX ORDER — POLYETHYLENE GLYCOL 3350 17 G/17G
1 POWDER, FOR SOLUTION ORAL
Status: DISCONTINUED | OUTPATIENT
Start: 2021-09-19 | End: 2021-09-22 | Stop reason: HOSPADM

## 2021-09-19 RX ORDER — LABETALOL HYDROCHLORIDE 5 MG/ML
20 INJECTION, SOLUTION INTRAVENOUS ONCE
Status: COMPLETED | OUTPATIENT
Start: 2021-09-19 | End: 2021-09-19

## 2021-09-19 RX ORDER — DEXTROSE MONOHYDRATE 25 G/50ML
50 INJECTION, SOLUTION INTRAVENOUS
Status: DISCONTINUED | OUTPATIENT
Start: 2021-09-19 | End: 2021-09-19

## 2021-09-19 RX ORDER — ATORVASTATIN CALCIUM 80 MG/1
80 TABLET, FILM COATED ORAL EVERY EVENING
Status: DISCONTINUED | OUTPATIENT
Start: 2021-09-19 | End: 2021-09-22 | Stop reason: HOSPADM

## 2021-09-19 RX ORDER — ASPIRIN 81 MG/1
324 TABLET, CHEWABLE ORAL DAILY
Status: DISCONTINUED | OUTPATIENT
Start: 2021-09-19 | End: 2021-09-20

## 2021-09-19 RX ORDER — ASPIRIN 300 MG/1
300 SUPPOSITORY RECTAL DAILY
Status: DISCONTINUED | OUTPATIENT
Start: 2021-09-19 | End: 2021-09-20

## 2021-09-19 RX ORDER — ACETAMINOPHEN 325 MG/1
650 TABLET ORAL EVERY 6 HOURS PRN
Status: DISCONTINUED | OUTPATIENT
Start: 2021-09-19 | End: 2021-09-22 | Stop reason: HOSPADM

## 2021-09-19 RX ORDER — GABAPENTIN 300 MG/1
300 CAPSULE ORAL 2 TIMES DAILY
Status: DISCONTINUED | OUTPATIENT
Start: 2021-09-19 | End: 2021-09-22 | Stop reason: HOSPADM

## 2021-09-19 RX ORDER — DEXTROSE MONOHYDRATE 25 G/50ML
50 INJECTION, SOLUTION INTRAVENOUS
Status: DISCONTINUED | OUTPATIENT
Start: 2021-09-19 | End: 2021-09-22 | Stop reason: HOSPADM

## 2021-09-19 RX ORDER — BISACODYL 10 MG
10 SUPPOSITORY, RECTAL RECTAL
Status: DISCONTINUED | OUTPATIENT
Start: 2021-09-19 | End: 2021-09-22 | Stop reason: HOSPADM

## 2021-09-19 RX ORDER — AMOXICILLIN 250 MG
2 CAPSULE ORAL 2 TIMES DAILY
Status: DISCONTINUED | OUTPATIENT
Start: 2021-09-19 | End: 2021-09-22 | Stop reason: HOSPADM

## 2021-09-19 RX ORDER — LABETALOL HYDROCHLORIDE 5 MG/ML
10 INJECTION, SOLUTION INTRAVENOUS
Status: DISCONTINUED | OUTPATIENT
Start: 2021-09-19 | End: 2021-09-20

## 2021-09-19 RX ADMIN — HUMAN ALBUMIN MICROSPHERES AND PERFLUTREN 3 ML: 10; .22 INJECTION, SOLUTION INTRAVENOUS at 15:30

## 2021-09-19 RX ADMIN — ATORVASTATIN CALCIUM 80 MG: 80 TABLET, FILM COATED ORAL at 19:49

## 2021-09-19 RX ADMIN — LABETALOL HYDROCHLORIDE 20 MG: 5 INJECTION, SOLUTION INTRAVENOUS at 11:27

## 2021-09-19 RX ADMIN — ASPIRIN 324 MG: 81 TABLET, CHEWABLE ORAL at 12:51

## 2021-09-19 RX ADMIN — IOHEXOL 80 ML: 350 INJECTION, SOLUTION INTRAVENOUS at 11:30

## 2021-09-19 RX ADMIN — GABAPENTIN 300 MG: 300 CAPSULE ORAL at 19:49

## 2021-09-19 RX ADMIN — IOHEXOL 40 ML: 350 INJECTION, SOLUTION INTRAVENOUS at 11:30

## 2021-09-19 ASSESSMENT — LIFESTYLE VARIABLES
DO YOU DRINK ALCOHOL: NO
AVERAGE NUMBER OF DAYS PER WEEK YOU HAVE A DRINK CONTAINING ALCOHOL: 0
TOTAL SCORE: 0
HAVE YOU EVER FELT YOU SHOULD CUT DOWN ON YOUR DRINKING: NO
DOES PATIENT WANT TO STOP DRINKING: NO
SUBSTANCE_ABUSE: 0
CONSUMPTION TOTAL: NEGATIVE
TOTAL SCORE: 0
HOW MANY TIMES IN THE PAST YEAR HAVE YOU HAD 5 OR MORE DRINKS IN A DAY: 0
TOTAL SCORE: 0
HAVE PEOPLE ANNOYED YOU BY CRITICIZING YOUR DRINKING: NO
ALCOHOL_USE: NO
EVER HAD A DRINK FIRST THING IN THE MORNING TO STEADY YOUR NERVES TO GET RID OF A HANGOVER: NO
EVER FELT BAD OR GUILTY ABOUT YOUR DRINKING: NO
ON A TYPICAL DAY WHEN YOU DRINK ALCOHOL HOW MANY DRINKS DO YOU HAVE: 0

## 2021-09-19 ASSESSMENT — COGNITIVE AND FUNCTIONAL STATUS - GENERAL
WALKING IN HOSPITAL ROOM: A LOT
CLIMB 3 TO 5 STEPS WITH RAILING: A LOT
MOVING TO AND FROM BED TO CHAIR: A LITTLE
MOVING FROM LYING ON BACK TO SITTING ON SIDE OF FLAT BED: A LOT
HELP NEEDED FOR BATHING: A LITTLE
SUGGESTED CMS G CODE MODIFIER MOBILITY: CL
DAILY ACTIVITIY SCORE: 15
MOBILITY SCORE: 14
TOILETING: A LOT
STANDING UP FROM CHAIR USING ARMS: A LOT
SUGGESTED CMS G CODE MODIFIER DAILY ACTIVITY: CK
TURNING FROM BACK TO SIDE WHILE IN FLAT BAD: A LITTLE
DRESSING REGULAR LOWER BODY CLOTHING: A LOT
DRESSING REGULAR UPPER BODY CLOTHING: A LOT
PERSONAL GROOMING: A LOT

## 2021-09-19 ASSESSMENT — PAIN DESCRIPTION - PAIN TYPE: TYPE: ACUTE PAIN

## 2021-09-19 ASSESSMENT — ENCOUNTER SYMPTOMS
COUGH: 0
DIZZINESS: 0
BACK PAIN: 0
HEARTBURN: 0
VOMITING: 0
BLURRED VISION: 0
WEAKNESS: 1
HEADACHES: 0
DOUBLE VISION: 0
SHORTNESS OF BREATH: 0
PALPITATIONS: 0
FEVER: 0
NERVOUS/ANXIOUS: 0
FOCAL WEAKNESS: 1
TINGLING: 1
ABDOMINAL PAIN: 0
CHILLS: 0
FALLS: 0

## 2021-09-19 ASSESSMENT — FIBROSIS 4 INDEX: FIB4 SCORE: 1.26

## 2021-09-19 ASSESSMENT — PATIENT HEALTH QUESTIONNAIRE - PHQ9
2. FEELING DOWN, DEPRESSED, IRRITABLE, OR HOPELESS: NOT AT ALL
1. LITTLE INTEREST OR PLEASURE IN DOING THINGS: NOT AT ALL
SUM OF ALL RESPONSES TO PHQ9 QUESTIONS 1 AND 2: 0

## 2021-09-19 NOTE — H&P
Hospital Medicine History & Physical Note    Date of Service  9/19/2021    Primary Care Physician  Ever Chamberlain M.D.    Consultants  neurology    Specialist Names: Dr Jm Ron    Code Status  DNAR/DNI    Chief Complaint  Chief Complaint   Patient presents with   • Numbness       History of Presenting Illness  Ariella Sanchez is a 65 y.o. female with a past medical history of diabetes, hypertension, previous CVA (with residual left-sided weakness) who presented 9/19/2021 with worsening left-sided weakness.    The patient states that she was at her usual state of health up until last night, when she started noticing left-sided weakness.  She mentions she did not pay too much attention and fell asleep.  This morning when she woke up she noticed that it was very hard for her to get out of bed, and noticed worsening left-sided weakness from her usual state.  Due to this the patient decided to come to the ER for further assessment and evaluation.  The patient denies chest pain, abdominal pain, headache, fever, sick contacts.    In the ER the patient had a CT scan of the head which did not report any acute intracranial findings.  There is a concern for new stroke.  Neurology has been consulted, we appreciate further recommendations.    The patient will be admitted to complete stroke work-up.    I discussed the plan of care with patient and ER physician.    Review of Systems  Review of Systems   Constitutional: Negative for chills and fever.   HENT: Negative for hearing loss and nosebleeds.    Eyes: Negative for blurred vision and double vision.   Respiratory: Negative for cough and shortness of breath.    Cardiovascular: Negative for chest pain and palpitations.   Gastrointestinal: Negative for abdominal pain, heartburn and vomiting.   Genitourinary: Negative for dysuria and urgency.   Musculoskeletal: Negative for back pain and falls.   Skin: Negative for itching and rash.   Neurological: Positive for tingling,  focal weakness and weakness. Negative for dizziness and headaches.   Psychiatric/Behavioral: Negative for substance abuse. The patient is not nervous/anxious.    All other systems reviewed and are negative.      Past Medical History   has a past medical history of Anxiety, Depression, Diabetes, Hypertension, Leg pain, Neuropathy, peripheral, and Stroke (HCC) (2011).    Surgical History   has a past surgical history that includes tonsillectomy; tubal ligation; and femur orif (Left, 12/19/2016).     Family History  Mother: DM, unspecified cancer  Father: Non-hodgkin lymphoma.    Social History   reports that she has never smoked. She has never used smokeless tobacco. She reports current drug use. Drug: Marijuana. She reports that she does not drink alcohol.    Allergies  Allergies   Allergen Reactions   • Metformin Nausea     GI upset. No hypersensitivity symptoms.   RXN=unknown       Medications  None       Physical Exam  Temp:  [36.7 °C (98 °F)] 36.7 °C (98 °F)  Pulse:  [61-86] 61  Resp:  [16-27] 18  BP: (194-199)/() 194/95  SpO2:  [95 %-97 %] 97 %  Blood Pressure : (!) 194/95   Temperature: 36.7 °C (98 °F)   Pulse: 61   Respiration: 18   Pulse Oximetry: 97 %       Physical Exam  Vitals and nursing note reviewed.   Constitutional:       General: She is not in acute distress.     Appearance: Normal appearance. She is obese. She is ill-appearing.   HENT:      Head: Normocephalic and atraumatic.      Right Ear: External ear normal.      Left Ear: External ear normal.      Mouth/Throat:      Pharynx: No oropharyngeal exudate or posterior oropharyngeal erythema.   Eyes:      General:         Right eye: No discharge.         Left eye: No discharge.   Cardiovascular:      Rate and Rhythm: Normal rate and regular rhythm.      Pulses: Normal pulses.      Heart sounds: No murmur heard.   No gallop.    Pulmonary:      Effort: Pulmonary effort is normal. No respiratory distress.      Breath sounds: Normal breath sounds.  No wheezing or rhonchi.   Abdominal:      General: Bowel sounds are normal. There is no distension.      Palpations: Abdomen is soft.      Tenderness: There is no abdominal tenderness. There is no guarding.   Musculoskeletal:         General: No swelling or tenderness. Normal range of motion.      Cervical back: Normal range of motion and neck supple. No tenderness.   Skin:     General: Skin is warm and dry.   Neurological:      Mental Status: She is alert and oriented to person, place, and time.      Motor: Weakness (worsening L sided weakness) present.   Psychiatric:         Mood and Affect: Mood normal.         Behavior: Behavior normal.         Thought Content: Thought content normal.         Judgment: Judgment normal.         Laboratory:  Recent Labs     09/19/21  0939   WBC 6.9   RBC 4.91   HEMOGLOBIN 14.7   HEMATOCRIT 44.6   MCV 90.8   MCH 29.9   MCHC 33.0*   RDW 40.7   PLATELETCT 260   MPV 11.1     Recent Labs     09/19/21  0939   SODIUM 135   POTASSIUM 4.5   CHLORIDE 100   CO2 24   GLUCOSE 173*   BUN 12   CREATININE 0.74   CALCIUM 9.1     Recent Labs     09/19/21  0939   ALTSGPT 6   ASTSGOT 12   ALKPHOSPHAT 88   TBILIRUBIN 0.7   GLUCOSE 173*     Recent Labs     09/19/21  0939   APTT 29.6   INR 1.20*     No results for input(s): NTPROBNP in the last 72 hours.      Recent Labs     09/19/21  0939   TROPONINT 11       Imaging:  CT-CTA NECK WITH & W/O-POST PROCESSING   Final Result      1.  Atherosclerosis without high-grade stenosis of the neck arteries.   2.  4.4 x 3.2 cm left thyroid mass could be further evaluated with outpatient ultrasound.      CT-CEREBRAL PERFUSION ANALYSIS   Final Result      1.  Cerebral blood flow less than 30% likely representing completed infarct = 0 mL.      2.  T Max more than 6 seconds likely representing combination of completed infarct and ischemia = 4 mL.      3.  Mismatched volume likely representing ischemic brain/penumbra = 4      4.  Please note that the cerebral perfusion  was performed on the limited brain tissue around the basal ganglia region. Infarct/ischemia outside the CT perfusion sections can be missed in this study.      CT-CTA HEAD WITH & W/O-POST PROCESS   Final Result      No large vessel occlusion or aneurysm.      CT-HEAD W/O   Final Result         1. No acute intracranial findings. No evidence of acute intracranial hemorrhage or mass lesion.      2. White matter lucencies most consistent with chronic small vessel ischemic change.               DX-CHEST-PORTABLE (1 VIEW)   Final Result         1. No acute cardiopulmonary abnormalities are identified.      EC-ECHOCARDIOGRAM COMPLETE W/O CONT    (Results Pending)   MR-BRAIN-W/O    (Results Pending)       X-Ray:  I have personally reviewed the images and compared with prior images.  EKG:  I have personally reviewed the images and compared with prior images.    Assessment/Plan:  I anticipate this patient is appropriate for observation status at this time.    * Left-sided weakness  Assessment & Plan  Patient has left-sided residual weakness from previous strokes.  However the patient states that she has worsening left-sided weakness.  There is a concern for new stroke, the patient is not a candidate for TPA at this time.  We are pending MRI, echocardiogram.  Patient currently on aspirin and atorvastatin.  Neurology has been consulted, we appreciate further recommendations.    CVA (cerebral vascular accident) (HCC)  Assessment & Plan  Patient has a history of CVA.  Patient states that he has not been taking any medication for over a year.  We have started patient on atorvastatin and aspirin.    The patient comes with worsening left-sided weakness, there is a concern for new stroke.  We are pending MRI and echocardiogram.  Neurology has been consulted, we appreciate further recommendations.    HTN (hypertension)- (present on admission)  Assessment & Plan  Patient with a history of hypertension.  The patient has not been taking  indication for over a year.  Patient with systolic blood pressure in the 190s on admission.  Due to concern for stroke, patient currently on permissive hypertension.  Monitor, make changes accordingly.    Non-compliant patient  Assessment & Plan  Patient has multiple comorbidities including diabetes, hypertension, previous CVA.  The patient mentions that she has not been taking any medication for over a year.  The patient will require close follow-up with PCP as an outpatient.    Advance care planning  Assessment & Plan  We discussed for at least 10 minutes at length the different options for CODE STATUS.  The patient is adamant that she would like to be DNR/DNI for now.    Dyslipidemia- (present on admission)  Assessment & Plan  It appears patient has a history of dyslipidemia.  Patient has not taken any medication for over a year.  Patient currently on atorvastatin.  Lipid panel has been ordered.    DM (diabetes mellitus) (CMS-[facility])- (present on admission)  Assessment & Plan  We have started the patient on insulin sliding scale.  A1c on 9/19/2021 was 7.  The patient mentioned that she has not taken any medication for over a year.  Diabetes educator  The patient will require close follow-up as an outpatient.  Hypoglycemia protocol.  Monitor, make changes accordingly.    Obesity- (present on admission)  Assessment & Plan  Patient with a BMI of 46.99.  The patient has been counseled on lifestyle modifications  The patient will require close follow-up as an outpatient.        VTE prophylaxis: SCDs/TEDs, stroke work up.

## 2021-09-19 NOTE — CONSULTS
Neurology Initial Consult H&P  Neurohospitalist Service, [facility]    Referring Physician: Adriano Coe MD    Reason for referral: left sided weakness, numbness    HPI: Ariella Sanchez is a 65 y.o. female with past history of right basal ganglia hemorrhagic stroke in 2011 with residual deficits left sided weakness and neuropathic pain, previous lacunar infarct left basal ganglia (asymptomatic), diabetes, HTN, obesity, off all medications for over one year, who presented today with complaint of worsened left sided weakness and numbness and for whom neurology was consulted with concern for stroke. She reports she first noticed these symptoms around 2000 last night while trying to roll over in bed. She eventually went to sleep, and when she woke this morning was unable to get out of bed, so called EMS. She lives alone, and uses a walker and motorized chair for mobility. She has been able to bathe herself and cook for herself at home. She reports feeling well prior to symptom onset, and denies recent fever/chills, abdominal pain, pain with urination, nausea, vomiting, diarrhea. At this time she is without visual complaint, speech difficultly, headache.     Review of systems: In addition to what is detailed in the HPI above, all other systems reviewed and are negative.    Past Medical History:    has a past medical history of Anxiety, Depression, Diabetes, Hypertension, Leg pain, Neuropathy, peripheral, and Stroke ([facility]) (2011).    FHx:  Denies history of strokes in the young    SHx:   reports that she has never smoked. She has never used smokeless tobacco. She reports current drug use. Drug: Marijuana. She reports that she does not drink alcohol.    Allergies:  Allergies   Allergen Reactions   • Metformin Nausea     GI upset. No hypersensitivity symptoms.   RXN=unknown       Medications:    Current Facility-Administered Medications:   •  senna-docusate (PERICOLACE or SENOKOT S) 8.6-50  MG per tablet 2 Tablet, 2 Tablet, Oral, BID **AND** polyethylene glycol/lytes (MIRALAX) PACKET 1 Packet, 1 Packet, Oral, QDAY PRN **AND** magnesium hydroxide (MILK OF MAGNESIA) suspension 30 mL, 30 mL, Oral, QDAY PRN **AND** bisacodyl (DULCOLAX) suppository 10 mg, 10 mg, Rectal, QDAY PRN, Adriano Pineda M.D.  •  acetaminophen (Tylenol) tablet 650 mg, 650 mg, Oral, Q6HRS PRN, Adriano Pineda M.D.  •  Allow for permissive hypertension: SBP up to 220 mmHg/DBP to 120 mmHg; If SBP greater than 220 mmHg or DBP greater than 120 mmHg administer PRN antihypertensive medications., , Other, PHARMACY TO DOSE, Adriano Pineda M.D.  •  atorvastatin (LIPITOR) tablet 80 mg, 80 mg, Oral, Q EVENING, Adriano Pineda M.D.  •  aspirin (ASA) tablet 325 mg, 325 mg, Oral, DAILY **OR** aspirin (ASA) chewable tab 324 mg, 324 mg, Oral, DAILY **OR** aspirin (ASA) suppository 300 mg, 300 mg, Rectal, DAILY, Adriano Pineda M.D.  •  labetalol (NORMODYNE/TRANDATE) injection 10 mg, 10 mg, Intravenous, Q10 MIN PRN, Adriano Pineda M.D.  •  hydrALAZINE (APRESOLINE) injection 10 mg, 10 mg, Intravenous, Q2HRS PRN, Adriano Pineda M.D.    Current Outpatient Medications:   •  OXYBUTYNIN CHLORIDE PO, Take  by mouth., Disp: , Rfl:   •  ranitidine (ZANTAC) 300 MG tablet, TAKE ONE-HALF TABLET BY MOUTH TWICE A DAY, Disp: 30 Tab, Rfl: 0  •  tramadol (ULTRAM) 50 MG Tab, Take 1 Tab by mouth every 8 hours as needed., Disp: 30 Tab, Rfl: 60  •  Misc. Devices Misc, Adult incontinence briefs to use as needed #90, Disp: 90 Device, Rfl: 6  •  Venlafaxine HCl 75 MG TABLET SR 24 HR, Take  mg by mouth 2 Times a Day. 150 mg every morning 75 mg at 1300, Disp: , Rfl:   •  trazodone (DESYREL) 50 MG Tab, Take 1 Tab by mouth every bedtime., Disp: 30 Tab, Rfl: 3  •  pregabalin (LYRICA) 75 MG Cap, Take 75 mg by mouth 3 times a day., Disp: , Rfl:     Physical Examination:     Vitals:    09/19/21  "0930 09/19/21 1100 09/19/21 1102   BP: (!) 199/100 (!) 195/83 (!) 198/85   Pulse: 86 70 67   Resp: 16 (!) 21 (!) 27   Temp: 36.7 °C (98 °F)     TempSrc: Temporal     SpO2: 95% 97% 96%   Weight: (!) 136 kg (300 lb)     Height: 1.702 m (5' 7\")         General: Patient is awake and in no acute distress  Eyes: examination of optic disks not indicated at this time given acuity of consult  CV: RRR    NEUROLOGICAL EXAM:     Mental status: Awake, alert and fully oriented, follows commands  Speech and language: speech is not dysarthric. The patient is able to name and repeat.  Cranial nerve exam: Pupils are equal, round and reactive to light bilaterally. Visual fields are full. Extraocular muscles are intact. Sensation in the face is intact to light touch. Face is symmetric. Hearing to finger rub equal. Palate elevates symmetrically. Shoulder shrug is full. Tongue is midline.  Motor exam: She is antigravity with drift in BLE. Tone is normal. No abnormal movements were seen on exam.  Sensory exam: Decreased sensation in the left arm and leg. Unable to detect pinprick, light touch.  Deep tendon reflexes:  Toes up-going on left, down-going on right.  Coordination: no ataxia with finger to nose b/l  Gait: deferred    NIHSS: National Institutes of [employer] Stroke Scale     [0] 1a:Level of Consciousness            0-alert 1-drowsy   2-stupor   3-coma  [0] 1b:LOC Questions                         0-both  1-one      2-neither  [0] 1c:LOC Commands                       0-both  1-one      2-neither  [0] 2: Best Gaze                       0-nl    1-partial  2-forced  [0] 3: Visual Fields                              0-nl    1-partial  2-complete 3-bilat  [0] 4: Facial Paresis                 0-nl    1-minor    2-partial  3-full    MOTOR                                              0-nl  [0] 5: Right Arm                        1-drift  [1] 6: Left Arm                                     2-some effort vs gravity  [0] 7: Right Leg           "              3-no effort vs gravity  [1] 8: Left Leg                                     4-no movement                                                             x-untestable    [0] 9: Limb Ataxia                     0-abs   1-1_limb   2-2+_limbs   x-untestable  [1] 10:Sensory                         0-nl    1-partial  2-dense  [0] 11:Best Language/Aphasia           0-nl    1-mild/mod 2-severe   3-mute  [0] 12:Dysarthria                      0-nl    1-mild/mod 2-severe   x-untestable  [0] 13:Neglect/Inattention                   0-none  1-partial  2-complete  [3] TOTAL    Modified [place]pine Scale (MRS): 2 = Slight disability; unable to perform all previous activities but able to look after own affairs without assistance    Objective Data:    Labs:  Lab Results   Component Value Date/Time    PROTHROMBTM 14.8 (H) 09/19/2021 09:39 AM    INR 1.20 (H) 09/19/2021 09:39 AM      Lab Results   Component Value Date/Time    WBC 6.9 09/19/2021 09:39 AM    RBC 4.91 09/19/2021 09:39 AM    HEMOGLOBIN 14.7 09/19/2021 09:39 AM    HEMATOCRIT 44.6 09/19/2021 09:39 AM    MCV 90.8 09/19/2021 09:39 AM    MCH 29.9 09/19/2021 09:39 AM    MCHC 33.0 (L) 09/19/2021 09:39 AM    MPV 11.1 09/19/2021 09:39 AM    NEUTSPOLYS 67.60 09/19/2021 09:39 AM    LYMPHOCYTES 23.90 09/19/2021 09:39 AM    MONOCYTES 5.20 09/19/2021 09:39 AM    EOSINOPHILS 2.50 09/19/2021 09:39 AM    BASOPHILS 0.40 09/19/2021 09:39 AM      Lab Results   Component Value Date/Time    SODIUM 135 09/19/2021 09:39 AM    POTASSIUM 4.5 09/19/2021 09:39 AM    CHLORIDE 100 09/19/2021 09:39 AM    CO2 24 09/19/2021 09:39 AM    GLUCOSE 173 (H) 09/19/2021 09:39 AM    BUN 12 09/19/2021 09:39 AM    CREATININE 0.74 09/19/2021 09:39 AM      Lab Results   Component Value Date/Time    CHOLSTRLTOT 201 (H) 06/17/2015 10:53 AM     (H) 06/17/2015 10:53 AM    HDL 47 06/17/2015 10:53 AM    TRIGLYCERIDE 93 06/17/2015 10:53 AM       Lab Results   Component Value Date/Time    ALKPHOSPHAT 88 09/19/2021  09:39 AM    ASTSGOT 12 09/19/2021 09:39 AM    ALTSGPT 6 09/19/2021 09:39 AM    TBILIRUBIN 0.7 09/19/2021 09:39 AM        Imaging/Testing:    I interpreted and/or reviewed the patient's neuroimaging    CT-CTA NECK WITH & W/O-POST PROCESSING   Final Result      1.  Atherosclerosis without high-grade stenosis of the neck arteries.   2.  4.4 x 3.2 cm left thyroid mass could be further evaluated with outpatient ultrasound.      CT-CEREBRAL PERFUSION ANALYSIS   Final Result      1.  Cerebral blood flow less than 30% likely representing completed infarct = 0 mL.      2.  T Max more than 6 seconds likely representing combination of completed infarct and ischemia = 4 mL.      3.  Mismatched volume likely representing ischemic brain/penumbra = 4      4.  Please note that the cerebral perfusion was performed on the limited brain tissue around the basal ganglia region. Infarct/ischemia outside the CT perfusion sections can be missed in this study.      CT-CTA HEAD WITH & W/O-POST PROCESS   Final Result      No large vessel occlusion or aneurysm.      CT-HEAD W/O   Final Result         1. No acute intracranial findings. No evidence of acute intracranial hemorrhage or mass lesion.      2. White matter lucencies most consistent with chronic small vessel ischemic change.               DX-CHEST-PORTABLE (1 VIEW)   Final Result         1. No acute cardiopulmonary abnormalities are identified.      EC-ECHOCARDIOGRAM COMPLETE W/O CONT    (Results Pending)   MR-BRAIN-W/O    (Results Pending)       Assessment and Plan:    65 y.o. female with past history of right basal ganglia hemorrhagic stroke in 2011 with residual deficits left sided weakness and neuropathic pain, previous lacunar infarct left basal ganglia (asymptomatic), diabetes, HTN, obesity, off all medications for over one year, who presented with complaint of worsened left sided weakness and numbness, onset 2000 last night. CT Head without acute abnormality. She was not  considered a candidate for IV tPA given presentation outside the time window. CTA head and neck without LVO or high grade stenosis; thus, not a candidate for IR intervention. Differential diagnoses considered include acute infarct, vs more likely recrudescence of old stroke symptoms triggered by physiologic stressor. No evidence leukocytosis or obvious metabolic derangement on CMP. Please obtain UA to evaluate for infection. Obtain MRI brain without cst to evaluate for acute infarct. OK to defer TTE for now given low suspicion for cardioembolic stroke. Start aspirin 81 mg PO daily for secondary stroke prevention, and atorvastatin 80 mg q HS with goal LDL < 70. Hemoglobin A1C is 7, goal is <7. Allow for permissive hypertension up to 48 hours from onset of symptoms, or until MRI obtained and no acute infarct identified. Treat neuropathic pain with Gabapentin 300 mg PO BID. She will be admitted to floor in stable condition, neurology to follow MRI.      Recommendations:    - Permissive HTN up to  for 48 hours from onset symptom onset 2000 9/18/21   - followed by goal BP goal 110-130/60-80  - secondary stroke prevention therapy with ASA 81mg  - Atorvastatin 80 mg q HS with goal LDL < 70  - serum studies for stroke risk factors: lipid panel & hemoglobin A1C  - Obtain UA with concern for metabolic derangement leading to recrudescence of prior stroke Sx.  - Obtain MRI brain without cst to evaluate for acute infarct  - Treat neuropathic pain with Gabapentin 300 mg PO BID  - evaluate and treat with PT/OT/ST    The evaluation of the patient, and recommended management, was discussed with attending Neurologist, Dr. Jm Benz, and hospitalist, Dr. Adriano Pineda

## 2021-09-19 NOTE — ED NOTES
Med rec complete per pt   Allergies reviewed   No abx last 30 days     Pt confirmed that they are taking no medications.

## 2021-09-19 NOTE — ASSESSMENT & PLAN NOTE
Patient has left-sided residual weakness from previous strokes.  However the patient states that she has worsening left-sided weakness.  There is a concern for new stroke, the patient is not a candidate for TPA at this time.  MRI showed acute infarcts of the bilateral frontal and right occipital lobes  Patient currently on aspirin and atorvastatin.  Neurology has been consulted, Zio patch on discharge, f/u stroke bridge clinic

## 2021-09-19 NOTE — ASSESSMENT & PLAN NOTE
Patient with a BMI of 46.99.  The patient has been counseled on lifestyle modifications  The patient will require close follow-up as an outpatient.

## 2021-09-19 NOTE — ASSESSMENT & PLAN NOTE
Patient has a history of CVA.  Patient states that he has not been taking any medication for over a year.  We have started patient on atorvastatin and aspirin.    The patient comes with worsening left-sided weakness, there is a concern for new stroke.  MRI showed acute CVA  Neurology following, zio patch, statin, asa, f/u outpatient

## 2021-09-19 NOTE — ASSESSMENT & PLAN NOTE
Patient has multiple comorbidities including diabetes, hypertension, previous CVA.  The patient mentions that she has not been taking any medication for over a year.  The patient will require close follow-up with PCP as an outpatient.

## 2021-09-19 NOTE — ASSESSMENT & PLAN NOTE
It appears patient has a history of dyslipidemia.  Patient has not taken any medication for over a year.  Patient currently on atorvastatin.

## 2021-09-19 NOTE — ASSESSMENT & PLAN NOTE
We discussed for at least 10 minutes at length the different options for CODE STATUS.  The patient is adamant that she would like to be DNR/DNI for now.

## 2021-09-19 NOTE — ED TRIAGE NOTES
Pt arrives to ED from home c/o left sided numbness, pt reports began at 8pm last night, numbness has progressed to a point where she cant walk, no other focal deficits on exam. BP elevated 200/100, pt reports no dx hx of htn.

## 2021-09-19 NOTE — ASSESSMENT & PLAN NOTE
We have started the patient on insulin sliding scale.  A1c on 9/19/2021 was 7.  The patient mentioned that she has not taken any medication for over a year.  Will need to start metformin on dc   The patient will require close follow-up as an outpatient.  Hypoglycemia protocol.  Monitor, make changes accordingly.

## 2021-09-19 NOTE — ASSESSMENT & PLAN NOTE
Patient with a history of hypertension.  The patient has not been taking indication for over a year.  Patient with systolic blood pressure in the 190s on admission.  Due to concern for stroke, patient started on permissive hypertension.  Per neuro ok to restart BP meds with goal 110-130/60-80  Start amlodipine

## 2021-09-20 ENCOUNTER — APPOINTMENT (OUTPATIENT)
Dept: RADIOLOGY | Facility: MEDICAL CENTER | Age: 66
DRG: 065 | End: 2021-09-20
Attending: INTERNAL MEDICINE
Payer: MEDICARE

## 2021-09-20 ENCOUNTER — HOSPITAL ENCOUNTER (INPATIENT)
Facility: REHABILITATION | Age: 66
End: 2021-09-20
Attending: PHYSICAL MEDICINE & REHABILITATION | Admitting: PHYSICAL MEDICINE & REHABILITATION
Payer: MEDICARE

## 2021-09-20 LAB
CHOLEST SERPL-MCNC: 189 MG/DL (ref 100–199)
GLUCOSE BLD-MCNC: 129 MG/DL (ref 65–99)
GLUCOSE BLD-MCNC: 140 MG/DL (ref 65–99)
GLUCOSE BLD-MCNC: 187 MG/DL (ref 65–99)
GLUCOSE BLD-MCNC: 202 MG/DL (ref 65–99)
HDLC SERPL-MCNC: 51 MG/DL
LDLC SERPL CALC-MCNC: 119 MG/DL
TRIGL SERPL-MCNC: 93 MG/DL (ref 0–149)

## 2021-09-20 PROCEDURE — 99232 SBSQ HOSP IP/OBS MODERATE 35: CPT | Performed by: INTERNAL MEDICINE

## 2021-09-20 PROCEDURE — 36415 COLL VENOUS BLD VENIPUNCTURE: CPT

## 2021-09-20 PROCEDURE — A9270 NON-COVERED ITEM OR SERVICE: HCPCS | Performed by: INTERNAL MEDICINE

## 2021-09-20 PROCEDURE — A9270 NON-COVERED ITEM OR SERVICE: HCPCS | Performed by: NURSE PRACTITIONER

## 2021-09-20 PROCEDURE — 97162 PT EVAL MOD COMPLEX 30 MIN: CPT

## 2021-09-20 PROCEDURE — 80061 LIPID PANEL: CPT

## 2021-09-20 PROCEDURE — 700102 HCHG RX REV CODE 250 W/ 637 OVERRIDE(OP): Performed by: INTERNAL MEDICINE

## 2021-09-20 PROCEDURE — 99223 1ST HOSP IP/OBS HIGH 75: CPT | Performed by: PHYSICAL MEDICINE & REHABILITATION

## 2021-09-20 PROCEDURE — 97166 OT EVAL MOD COMPLEX 45 MIN: CPT

## 2021-09-20 PROCEDURE — 770020 HCHG ROOM/CARE - TELE (206)

## 2021-09-20 PROCEDURE — 70551 MRI BRAIN STEM W/O DYE: CPT | Mod: ME

## 2021-09-20 PROCEDURE — 99232 SBSQ HOSP IP/OBS MODERATE 35: CPT | Performed by: NURSE PRACTITIONER

## 2021-09-20 PROCEDURE — 92523 SPEECH SOUND LANG COMPREHEN: CPT

## 2021-09-20 PROCEDURE — 700111 HCHG RX REV CODE 636 W/ 250 OVERRIDE (IP): Performed by: INTERNAL MEDICINE

## 2021-09-20 PROCEDURE — 82962 GLUCOSE BLOOD TEST: CPT

## 2021-09-20 PROCEDURE — 770001 HCHG ROOM/CARE - MED/SURG/GYN PRIV*

## 2021-09-20 PROCEDURE — 700102 HCHG RX REV CODE 250 W/ 637 OVERRIDE(OP): Performed by: NURSE PRACTITIONER

## 2021-09-20 RX ORDER — HYDRALAZINE HYDROCHLORIDE 20 MG/ML
10 INJECTION INTRAMUSCULAR; INTRAVENOUS
Status: DISCONTINUED | OUTPATIENT
Start: 2021-09-20 | End: 2021-09-22 | Stop reason: HOSPADM

## 2021-09-20 RX ORDER — LABETALOL HYDROCHLORIDE 5 MG/ML
10 INJECTION, SOLUTION INTRAVENOUS
Status: COMPLETED | OUTPATIENT
Start: 2021-09-20 | End: 2021-09-21

## 2021-09-20 RX ORDER — AMLODIPINE BESYLATE 5 MG/1
5 TABLET ORAL ONCE
Status: COMPLETED | OUTPATIENT
Start: 2021-09-20 | End: 2021-09-20

## 2021-09-20 RX ORDER — AMLODIPINE BESYLATE 5 MG/1
5 TABLET ORAL
Status: DISCONTINUED | OUTPATIENT
Start: 2021-09-20 | End: 2021-09-20

## 2021-09-20 RX ORDER — AMLODIPINE BESYLATE 5 MG/1
10 TABLET ORAL
Status: DISCONTINUED | OUTPATIENT
Start: 2021-09-21 | End: 2021-09-21

## 2021-09-20 RX ORDER — ASPIRIN 81 MG/1
81 TABLET, CHEWABLE ORAL DAILY
Status: DISCONTINUED | OUTPATIENT
Start: 2021-09-21 | End: 2021-09-22 | Stop reason: HOSPADM

## 2021-09-20 RX ADMIN — AMLODIPINE BESYLATE 5 MG: 5 TABLET ORAL at 17:32

## 2021-09-20 RX ADMIN — ASPIRIN 325 MG ORAL TABLET 325 MG: 325 PILL ORAL at 04:18

## 2021-09-20 RX ADMIN — GABAPENTIN 300 MG: 300 CAPSULE ORAL at 17:31

## 2021-09-20 RX ADMIN — AMLODIPINE BESYLATE 5 MG: 5 TABLET ORAL at 14:24

## 2021-09-20 RX ADMIN — GABAPENTIN 300 MG: 300 CAPSULE ORAL at 04:18

## 2021-09-20 RX ADMIN — ATORVASTATIN CALCIUM 80 MG: 80 TABLET, FILM COATED ORAL at 17:32

## 2021-09-20 RX ADMIN — ENOXAPARIN SODIUM 60 MG: 60 INJECTION, SOLUTION INTRAVENOUS; SUBCUTANEOUS at 14:39

## 2021-09-20 ASSESSMENT — ENCOUNTER SYMPTOMS
SHORTNESS OF BREATH: 0
ABDOMINAL PAIN: 0
VOMITING: 0
NAUSEA: 0
CHILLS: 0
WEAKNESS: 1
FEVER: 0
DIZZINESS: 0
DEPRESSION: 0
HEADACHES: 0
MYALGIAS: 0

## 2021-09-20 ASSESSMENT — COGNITIVE AND FUNCTIONAL STATUS - GENERAL
SUGGESTED CMS G CODE MODIFIER DAILY ACTIVITY: CK
STANDING UP FROM CHAIR USING ARMS: A LOT
MOVING FROM LYING ON BACK TO SITTING ON SIDE OF FLAT BED: UNABLE
CLIMB 3 TO 5 STEPS WITH RAILING: TOTAL
MOVING TO AND FROM BED TO CHAIR: UNABLE
DRESSING REGULAR LOWER BODY CLOTHING: A LOT
HELP NEEDED FOR BATHING: A LOT
TURNING FROM BACK TO SIDE WHILE IN FLAT BAD: A LOT
SUGGESTED CMS G CODE MODIFIER MOBILITY: CM
DAILY ACTIVITIY SCORE: 16
MOBILITY SCORE: 9
DRESSING REGULAR UPPER BODY CLOTHING: A LITTLE
TOILETING: A LOT
PERSONAL GROOMING: A LITTLE
WALKING IN HOSPITAL ROOM: A LOT

## 2021-09-20 ASSESSMENT — PAIN DESCRIPTION - PAIN TYPE: TYPE: ACUTE PAIN

## 2021-09-20 ASSESSMENT — GAIT ASSESSMENTS: GAIT LEVEL OF ASSIST: UNABLE TO PARTICIPATE

## 2021-09-20 ASSESSMENT — ACTIVITIES OF DAILY LIVING (ADL): TOILETING: INDEPENDENT

## 2021-09-20 NOTE — PROGRESS NOTES
"Neurology Progress Note  Neurohospitalist Service, [facility]    Referring Physician: Nell Barton D.O.    Chief Complaint   Patient presents with   • Numbness       HPI: Refer to initial documented Neurology H&P, as detailed in the patient's chart.    Interval History 9/20/2021: No acute events overnight. Patient currently is sitting up in bed, awake, alert, fully oriented, and following commands having just finished breakfast without difficulty. She reports she feels back to \"my normal\" with baseline subtle left sided weakness and peripheral neuropathy. Denies headache, vision changes, facial weakness, ataxia, change in speech or language, memory loss, abnormal movements, seizures, or episodes of confusion.     Past Medical History:   Past Medical History:   Diagnosis Date   • Anxiety    • Depression    • Diabetes    • Hypertension    • Leg pain    • Neuropathy, peripheral    • Stroke (HCC) 2011        FHx:  History reviewed. No pertinent family history.     SHx:  Social History     Socioeconomic History   • Marital status: Single     Spouse name: Not on file   • Number of children: Not on file   • Years of education: Not on file   • Highest education level: Not on file   Occupational History   • Not on file   Tobacco Use   • Smoking status: Never Smoker   • Smokeless tobacco: Never Used   Substance and Sexual Activity   • Alcohol use: No   • Drug use: Yes     Types: Marijuana   • Sexual activity: Not on file   Other Topics Concern   • Not on file   Social History Narrative   • Not on file     Social Determinants of Health     Financial Resource Strain:    • Difficulty of Paying Living Expenses:    Food Insecurity:    • Worried About Running Out of Food in the Last Year:    • Ran Out of Food in the Last Year:    Transportation Needs:    • Lack of Transportation (Medical):    • Lack of Transportation (Non-Medical):    Physical Activity:    • Days of Exercise per Week:    • Minutes of " Exercise per Session:    Stress:    • Feeling of Stress :    Social Connections:    • Frequency of Communication with Friends and Family:    • Frequency of Social Gatherings with Friends and Family:    • Attends Sikhism Services:    • Active Member of Clubs or Organizations:    • Attends Club or Organization Meetings:    • Marital Status:    Intimate Partner Violence:    • Fear of Current or Ex-Partner:    • Emotionally Abused:    • Physically Abused:    • Sexually Abused:         Medications:    Current Facility-Administered Medications:   •  senna-docusate (PERICOLACE or SENOKOT S) 8.6-50 MG per tablet 2 Tablet, 2 Tablet, Oral, BID **AND** polyethylene glycol/lytes (MIRALAX) PACKET 1 Packet, 1 Packet, Oral, QDAY PRN **AND** magnesium hydroxide (MILK OF MAGNESIA) suspension 30 mL, 30 mL, Oral, QDAY PRN **AND** bisacodyl (DULCOLAX) suppository 10 mg, 10 mg, Rectal, QDAY PRN, Adriano iPneda M.D.  •  acetaminophen (Tylenol) tablet 650 mg, 650 mg, Oral, Q6HRS PRN, Adriano Pineda M.D.  •  Allow for permissive hypertension: SBP up to 220 mmHg/DBP to 120 mmHg; If SBP greater than 220 mmHg or DBP greater than 120 mmHg administer PRN antihypertensive medications., , Other, PHARMACY TO DOSE, Adriano Pineda M.D.  •  atorvastatin (LIPITOR) tablet 80 mg, 80 mg, Oral, Q EVENING, Adriano Pineda M.D., 80 mg at 09/19/21 1949  •  aspirin (ASA) tablet 325 mg, 325 mg, Oral, DAILY, 325 mg at 09/20/21 0418 **OR** aspirin (ASA) chewable tab 324 mg, 324 mg, Oral, DAILY, 324 mg at 09/19/21 1251 **OR** aspirin (ASA) suppository 300 mg, 300 mg, Rectal, DAILY, Adriano Pineda M.D.  •  labetalol (NORMODYNE/TRANDATE) injection 10 mg, 10 mg, Intravenous, Q10 MIN PRN, Adriano Pineda M.D.  •  hydrALAZINE (APRESOLINE) injection 10 mg, 10 mg, Intravenous, Q2HRS PRN, Adriano Pineda M.D.  •  gabapentin (NEURONTIN) capsule 300 mg, 300 mg, Oral, BID, Corinne E Canavero,  A.P.R.N., 300 mg at 09/20/21 0418  •  insulin regular (HumuLIN R,NovoLIN R) injection, 1-6 Units, Subcutaneous, 4X/DAY ACHS **AND** POC blood glucose manual result, , , Q6H **AND** NOTIFY MD and PharmD, , , Once **AND** glucose 4 g chewable tablet 16 g, 16 g, Oral, Q15 MIN PRN **AND** dextrose 50% (D50W) injection 50 mL, 50 mL, Intravenous, Q15 MIN PRN, Adriano Pineda M.D.    Allergies:  Allergies   Allergen Reactions   • Metformin Nausea     GI upset. No hypersensitivity symptoms.   RXN=unknown        Review of systems: In addition to what is detailed in the interval history above, all other systems reviewed and are negative.      Physical Examination:  Vitals:    09/19/21 2005 09/20/21 0000 09/20/21 0400 09/20/21 0737   BP: (!) 185/91 (!) 177/91 (!) 180/99 (!) 198/111   Pulse: 64 67 66 70   Resp: 16 16 16 18   Temp: 36.6 °C (97.8 °F) 36.3 °C (97.3 °F) 36.1 °C (96.9 °F) 36.2 °C (97.2 °F)   TempSrc: Temporal Temporal Temporal Temporal   SpO2: 99% 94% 97% 97%   Weight: (!) 148 kg (326 lb 8 oz)      Height:         General: Patient in no acute distress, obese, pleasant, and cooperative.  HEENT: Normocephalic, no signs of acute trauma.   Neck: Supple, no meningeal signs or carotid bruits. There is normal range of motion. No tenderness on exam.   Chest: Regular and unlabored breaths on RA. No cough.   CV: RRR.   Skin: No signs of acute rashes or trauma.   Musculoskeletal: Joints exhibit full range of motion, without any pain to palpation. There are no signs of joint or muscle swelling. There is no tenderness to deep palpation of muscles.   Psychiatric: No hallucinatory behavior. Denies symptoms of depression or suicidal ideation. Mood and affect appear normal on exam.     NEUROLOGICAL EXAM:   Mental status, orientation: Awake, alert, following commands, and fully oriented.   Speech and language: Speech is clear and fluent. The patient is able to name, repeat, and comprehend.   Memory: There is intact  recollection of recent and remote events.   Cranial nerve exam: Pupils are 3-4 mm bilaterally and equally reactive to light. Visual fields are full to field test. There is no nystagmus on primary or secondary gaze. Intact full EOM in all directions of gaze. Face appears symmetric. Sensation in the face is intact to light touch. Uvula is midline. Palate elevates symmetrically. Tongue is midline and without any signs of tongue biting or fasciculations. Sternocleidomastoid muscles exhibit is normal strength bilaterally. Shoulder shrug is intact bilaterally.   Motor exam: Strength is 5/5 in RUE, 4+/5 to distal LUE (basline per patient, and 4+/5 to BLE without drift appreciate in any extremities. Tone is normal. No abnormal movements were seen on exam.   Sensory exam Reveals decreased sense of light touch and pinprick from hips down to distal BLE (baseline peripheral neuropathy per patient).  Deep tendon reflexes:  2+ throughout. Plantar responses are mute. There is no clonus.   Coordination: No ataxia with a normal finger-nose-finger and heel-down-shin. Normal rapidly alternating movements.   Gait: Deferred per patient preference.     NIH Stroke Scale  9/20/21 08:45    1a. Level of Consciousness (Alert, drowsy, etc): 0= Alert    1b. LOC Questions (Month, age): 0= Answers both correctly    1c. LOC Commands (Open/close eyes make fist/let go): 0= Obeys both correctly    2.   Best Gaze (Eyes open - patient follows examiner's finger on face): 0= Normal    3.   Visual Fields (introduce visual stimulus/threat to patient's field quadrants): 0= No visual loss    4.   Facial Paresis (Show teeth, raise eyebrows and squeeze eyes shut): 0= Normal     5a. Motor Arm - Left (Elevate arm to 90 degrees if patient is sitting, 45 degrees if  supine): 0= No drift    5b. Motor Arm - Right (Elevate arm to 90 degrees if patient is sitting, 45 degrees if supine): 0= No drift    6a. Motor Leg - Left (Elevate leg 30 degrees with patient supine):  0= No drift    6b. Motor Leg - Right  (Elevate leg 30 degrees with patient supine): 0= No drift    7.   Limb Ataxia (Finger-nose, heel down shin): 0= No ataxia    8.   Sensory (Pin prick to face, arm, trunk and leg - compare side to side): 1= Partial loss    9.  Best Language (Name item, describe a picture and read sentences): 0= No aphasia    10. Dysarthria (Evaluate speech clarity by patient repeating listed words): 0= Normal articulation    11. Extinction and Inattention (Use information from prior testing to identify neglect or  double simultaneous stimuli testing): 0= No neglect    Total NIH Score: 1      Ancillary Data Reviewed:    Labs:  Lab Results   Component Value Date/Time    PROTHROMBTM 14.8 (H) 09/19/2021 09:39 AM    INR 1.20 (H) 09/19/2021 09:39 AM      Lab Results   Component Value Date/Time    WBC 6.9 09/19/2021 09:39 AM    RBC 4.91 09/19/2021 09:39 AM    HEMOGLOBIN 14.7 09/19/2021 09:39 AM    HEMATOCRIT 44.6 09/19/2021 09:39 AM    MCV 90.8 09/19/2021 09:39 AM    MCH 29.9 09/19/2021 09:39 AM    MCHC 33.0 (L) 09/19/2021 09:39 AM    MPV 11.1 09/19/2021 09:39 AM    NEUTSPOLYS 67.60 09/19/2021 09:39 AM    LYMPHOCYTES 23.90 09/19/2021 09:39 AM    MONOCYTES 5.20 09/19/2021 09:39 AM    EOSINOPHILS 2.50 09/19/2021 09:39 AM    BASOPHILS 0.40 09/19/2021 09:39 AM      Lab Results   Component Value Date/Time    SODIUM 135 09/19/2021 09:39 AM    POTASSIUM 4.5 09/19/2021 09:39 AM    CHLORIDE 100 09/19/2021 09:39 AM    CO2 24 09/19/2021 09:39 AM    GLUCOSE 173 (H) 09/19/2021 09:39 AM    BUN 12 09/19/2021 09:39 AM    CREATININE 0.74 09/19/2021 09:39 AM      Lab Results   Component Value Date/Time    CHOLSTRLTOT 189 09/20/2021 02:28 AM     (H) 09/20/2021 02:28 AM    HDL 51 09/20/2021 02:28 AM    TRIGLYCERIDE 93 09/20/2021 02:28 AM       Lab Results   Component Value Date/Time    ALKPHOSPHAT 88 09/19/2021 09:39 AM    ASTSGOT 12 09/19/2021 09:39 AM    ALTSGPT 6 09/19/2021 09:39 AM    TBILIRUBIN 0.7 09/19/2021  09:39 AM        Imaging/Testing:    I interpreted and/or reviewed the patient's neuroimaging    MR-BRAIN-W/O   Final Result      1.  A few small areas of acute infarcts in the bilateral frontal and right occipital lobes.   2.  Encephalomalacia in the right basal ganglia with chronic hemorrhagic products.   3.  Multifocal chronic infarcts.   4.  Moderate chronic microvascular ischemic disease.   5.  Moderate cerebral volume loss.      EC-ECHOCARDIOGRAM COMPLETE W/ CONT   Final Result      CT-CTA NECK WITH & W/O-POST PROCESSING   Final Result      1.  Atherosclerosis without high-grade stenosis of the neck arteries.   2.  4.4 x 3.2 cm left thyroid mass could be further evaluated with outpatient ultrasound.      CT-CEREBRAL PERFUSION ANALYSIS   Final Result      1.  Cerebral blood flow less than 30% likely representing completed infarct = 0 mL.      2.  T Max more than 6 seconds likely representing combination of completed infarct and ischemia = 4 mL.      3.  Mismatched volume likely representing ischemic brain/penumbra = 4      4.  Please note that the cerebral perfusion was performed on the limited brain tissue around the basal ganglia region. Infarct/ischemia outside the CT perfusion sections can be missed in this study.      CT-CTA HEAD WITH & W/O-POST PROCESS   Final Result      No large vessel occlusion or aneurysm.      CT-HEAD W/O   Final Result         1. No acute intracranial findings. No evidence of acute intracranial hemorrhage or mass lesion.      2. White matter lucencies most consistent with chronic small vessel ischemic change.               DX-CHEST-PORTABLE (1 VIEW)   Final Result         1. No acute cardiopulmonary abnormalities are identified.          Assessment and Plan:    Ariella Sanchez is a 65 y.o. female with relevant history of right basal ganglia hemorrhagic stroke (2011) with residual left sided weakness and neuropathic pain, previous lacunar infarct left basal ganglia infarct  (asymptomatic), DM type II, HTN, morbid obesity, and medication noncompliance (off all medication for over 1 year due to pandemic per patient report) who presented 9/19/21 for worsening left sided weakness and numbness whom neurology was consulted to address possible acute stroke. CTA head and neck w/wo contrast showed atherosclerosis of bilateral cervical carotids without high grade stenosis, no large vessel occlusion, aneurysm, or dissection. MRI brain wo contrast revealed small multifocal acute infarcts including the right frontal, right parietal/occipital lobe, and left basal ganglia without hemorrhagic transformation. Additionally, TTE showed EF 60 %, moderate left ventricular hypertrophy, no valvular abnormalities, and mild left atrial dilation the latter of which may be suggestive of occult Afib. Etiology is suspicious for cardioembolic etiology given the presence of small multifocal acute infarcts in bilateral hemispheres of multiple vascular territories.     Plan:    -q4h and PRN neuro assessment. VS per nursing/unit protocol.   -Normotensive BP goal with long-term goal 110-130/60-80. Antihypertensives per primary team.   -Telemetry; currently SR. Screen for Afib/arrhythmia.    -Outpatient extended cardiac event monitoring for afib (eg. Zio patch). Please place patch upon discharge.   -ASA 81 mg PO daily citing RESPECT-ESUS and NAVIGATE-ESUS Trials which recommend against empiric anticoagulation when suspicion for cardioembolic stroke is high in the absence of definitive diagnosis.  -Atorvastatin 80 mg PO q HS for LDL goal <70. Note .   -Recommend aggressive BG management per primary team. Avoid IVF with Dextrose. BG goal  and Hgb A1c goal <7. Note hemoglobin A1c 7.   -PT/OT/SLP eval and treat.   -Counseled patient at length regarding life style and risk factor modification for secondary stroke prevention including medication compliance, BP management, cholesterol and BG management.   -Follow  up outpatient at [facility]. Referral placed.   -All other medical management per primary team.   -DVT PPX: SCDs      No further recommendations or further studies from a neurological standpoint at this time. Please re-consult if you have further questions or there is a change in status.    The evaluation of the patient, and recommended management, was discussed with Dr. Rachel Ron, Dr. Barton, and bedside RN. I have performed a physical exam and reviewed and updated ROS and Plan today (9/20/2021). In review of yesterday's note (9/19/2021), there are no changes except as documented above.    SERGO Damon.   Nurse Practitioner, Neurohospitalist  [facility]  t) [phone] (f) [phone]

## 2021-09-20 NOTE — CARE PLAN
The patient is Stable - Low risk of patient condition declining or worsening    Shift Goals  Clinical Goals: Maintain neurological status   Patient Goals: Rest  Family Goals: RYAN    Progress made toward(s) clinical / shift goals: Assumed care of patient at 1900. A&Ox4, Q4hr neuro checks are being completed, and waffle overlay is in place to maintain skin integrity.NIHSS = 2 for sensory loss and tactile inattention. Bed is low and locked, bed alarm is on, call light is within reach, hourly rounding continues.     Patient is not progressing towards the following goals:    Problem: Urinary Elimination  Goal: Establish and maintain regular urinary output  Outcome: Not Progressing  Note: Patient has purewick in place for urinary frequency.      Problem: Mobility - Stroke  Goal: Patient's capacity to carry out activities will improve  Outcome: Not Progressing  Note: Patient has L sided numbness and tingling. Patient has been unable to ambulate since this morning before coming into the ED.

## 2021-09-20 NOTE — PROGRESS NOTES
Hospital Medicine Daily Progress Note    Date of Service  9/20/2021    Chief Complaint  Ariella Sanchez is a 65 y.o. female admitted 9/19/2021 with left sided weakness    Hospital Course   65 y.o. female with a past medical history of diabetes, hypertension, previous CVA (with residual left-sided weakness) who presented 9/19/2021 with worsening left-sided weakness.     The patient states that she was at her usual state of health up until last night, when she started noticing left-sided weakness.  She mentions she did not pay too much attention and fell asleep.  This morning when she woke up she noticed that it was very hard for her to get out of bed, and noticed worsening left-sided weakness from her usual state.  Due to this the patient decided to come to the ER for further assessment and evaluation.  The patient denies chest pain, abdominal pain, headache, fever, sick contacts.     In the ER the patient had a CT scan of the head which did not report any acute intracranial findings.  There is a concern for new stroke.  Neurology has been consulted, we appreciate further recommendations.    Interval Problem Update  Echo showed EF 60%.  MRI brain showed new small areas of acute infarcts in the bilateral frontal and right occipital lobes.  A1c 7.  Patient reports her left-sided weakness has resolved, she is still feeling extremely tired.  Pending PT/OT, PMNR consulted.  Neurology has signed off recommending Zio patch at discharge recommending goal of normotensive -130/60-80, will start amlodipine, blood pressure still 180s.    I have personally seen and examined the patient at bedside. I discussed the plan of care with patient, bedside RN, charge RN and .    Consultants/Specialty  neurology    Code Status  DNAR/DNI    Disposition  Patient is not medically cleared.   Anticipate discharge to to skilled nursing facility vs rehab   I have placed the appropriate orders for post-discharge needs.    Review of  Systems  Review of Systems   Constitutional: Positive for malaise/fatigue. Negative for chills and fever.   Respiratory: Negative for shortness of breath.    Cardiovascular: Negative for chest pain.   Gastrointestinal: Negative for abdominal pain, nausea and vomiting.   Musculoskeletal: Negative for myalgias.   Skin: Negative for rash.   Neurological: Positive for weakness. Negative for dizziness and headaches.   Psychiatric/Behavioral: Negative for depression.   All other systems reviewed and are negative.       Physical Exam  Temp:  [36.1 °C (96.9 °F)-36.6 °C (97.8 °F)] 36.3 °C (97.3 °F)  Pulse:  [55-70] 68  Resp:  [16-26] 16  BP: (152-220)/() 180/90  SpO2:  [94 %-99 %] 96 %    Physical Exam  Vitals and nursing note reviewed.   Constitutional:       General: She is not in acute distress.  HENT:      Head: Normocephalic.   Eyes:      Extraocular Movements: Extraocular movements intact.      Conjunctiva/sclera: Conjunctivae normal.   Cardiovascular:      Rate and Rhythm: Normal rate and regular rhythm.      Pulses: Normal pulses.      Heart sounds: Normal heart sounds.   Pulmonary:      Effort: Pulmonary effort is normal. No respiratory distress.      Breath sounds: Normal breath sounds.   Abdominal:      General: Abdomen is flat. There is no distension.      Palpations: Abdomen is soft.      Tenderness: There is no abdominal tenderness.   Musculoskeletal:         General: Normal range of motion.      Cervical back: Normal range of motion and neck supple.      Right lower leg: No edema.      Left lower leg: No edema.   Skin:     General: Skin is warm and dry.   Neurological:      General: No focal deficit present.      Mental Status: She is alert and oriented to person, place, and time.      Cranial Nerves: No cranial nerve deficit.      Motor: Weakness (b/l LE, chronic per pt ) present.   Psychiatric:         Mood and Affect: Mood normal.         Behavior: Behavior normal.         Fluids    Intake/Output  Summary (Last 24 hours) at 9/20/2021 1324  Last data filed at 9/20/2021 1259  Gross per 24 hour   Intake 480 ml   Output 1150 ml   Net -670 ml       Laboratory  Recent Labs     09/19/21  0939   WBC 6.9   RBC 4.91   HEMOGLOBIN 14.7   HEMATOCRIT 44.6   MCV 90.8   MCH 29.9   MCHC 33.0*   RDW 40.7   PLATELETCT 260   MPV 11.1     Recent Labs     09/19/21  0939   SODIUM 135   POTASSIUM 4.5   CHLORIDE 100   CO2 24   GLUCOSE 173*   BUN 12   CREATININE 0.74   CALCIUM 9.1     Recent Labs     09/19/21  0939   APTT 29.6   INR 1.20*         Recent Labs     09/20/21  0228   TRIGLYCERIDE 93   HDL 51   *       Imaging  MR-BRAIN-W/O   Final Result      1.  A few small areas of acute infarcts in the bilateral frontal and right occipital lobes.   2.  Encephalomalacia in the right basal ganglia with chronic hemorrhagic products.   3.  Multifocal chronic infarcts.   4.  Moderate chronic microvascular ischemic disease.   5.  Moderate cerebral volume loss.      EC-ECHOCARDIOGRAM COMPLETE W/ CONT   Final Result      CT-CTA NECK WITH & W/O-POST PROCESSING   Final Result      1.  Atherosclerosis without high-grade stenosis of the neck arteries.   2.  4.4 x 3.2 cm left thyroid mass could be further evaluated with outpatient ultrasound.      CT-CEREBRAL PERFUSION ANALYSIS   Final Result      1.  Cerebral blood flow less than 30% likely representing completed infarct = 0 mL.      2.  T Max more than 6 seconds likely representing combination of completed infarct and ischemia = 4 mL.      3.  Mismatched volume likely representing ischemic brain/penumbra = 4      4.  Please note that the cerebral perfusion was performed on the limited brain tissue around the basal ganglia region. Infarct/ischemia outside the CT perfusion sections can be missed in this study.      CT-CTA HEAD WITH & W/O-POST PROCESS   Final Result      No large vessel occlusion or aneurysm.      CT-HEAD W/O   Final Result         1. No acute intracranial findings. No  evidence of acute intracranial hemorrhage or mass lesion.      2. White matter lucencies most consistent with chronic small vessel ischemic change.               DX-CHEST-PORTABLE (1 VIEW)   Final Result         1. No acute cardiopulmonary abnormalities are identified.           Assessment/Plan  * Left-sided weakness  Assessment & Plan  Patient has left-sided residual weakness from previous strokes.  However the patient states that she has worsening left-sided weakness.  There is a concern for new stroke, the patient is not a candidate for TPA at this time.  MRI showed acute infarcts of the bilateral frontal and right occipital lobes  Patient currently on aspirin and atorvastatin.  Neurology has been consulted, Zio patch on discharge, f/u stroke bridge clinic     Advance care planning  Assessment & Plan  We discussed for at least 10 minutes at length the different options for CODE STATUS.  The patient is adamant that she would like to be DNR/DNI for now.    Non-compliant patient  Assessment & Plan  Patient has multiple comorbidities including diabetes, hypertension, previous CVA.  The patient mentions that she has not been taking any medication for over a year.  The patient will require close follow-up with PCP as an outpatient.    CVA (cerebral vascular accident) (HCC)  Assessment & Plan  Patient has a history of CVA.  Patient states that he has not been taking any medication for over a year.  We have started patient on atorvastatin and aspirin.    The patient comes with worsening left-sided weakness, there is a concern for new stroke.  MRI showed acute CVA  Neurology following, zio patch, statin, asa, f/u outpatient     Dyslipidemia- (present on admission)  Assessment & Plan  It appears patient has a history of dyslipidemia.  Patient has not taken any medication for over a year.  Patient currently on atorvastatin.    HTN (hypertension)- (present on admission)  Assessment & Plan  Patient with a history of  hypertension.  The patient has not been taking indication for over a year.  Patient with systolic blood pressure in the 190s on admission.  Due to concern for stroke, patient started on permissive hypertension.  Per neuro ok to restart BP meds with goal 110-130/60-80  Start amlodipine    DM (diabetes mellitus) (CMS-[facility])- (present on admission)  Assessment & Plan  We have started the patient on insulin sliding scale.  A1c on 9/19/2021 was 7.  The patient mentioned that she has not taken any medication for over a year.  Will need to start metformin on dc   The patient will require close follow-up as an outpatient.  Hypoglycemia protocol.  Monitor, make changes accordingly.    Obesity- (present on admission)  Assessment & Plan  Patient with a BMI of 46.99.  The patient has been counseled on lifestyle modifications  The patient will require close follow-up as an outpatient.       VTE prophylaxis: SCDs/TEDs and enoxaparin ppx    I have performed a physical exam and reviewed and updated ROS and Plan today (9/20/2021). In review of yesterday's note (9/19/2021), there are no changes except as documented above.

## 2021-09-20 NOTE — DISCHARGE PLANNING
[facility]    Referral from:  Dr Milan Pineda MD  Insurance Provider on Facesheet: Medicare/[employer] FFS secondary  Potential Rehab Diagnosis: Stroke    Chart review indicates patient may need on going medical management and may need therapy needs to possibly meet inpatient rehab facility criteria with the goal of returning to community.    D/C support: TBD     Physiatry consultation: Forwarded per protocol.     Last Covid test:      Patient admitted for left-sided weakness with h/o of hemorrhagic stroke in 2011. Therapy evals pending. Forwarded to physiatry. Waiting on additional information to determine appropriateness for acute inpatient rehabilitation. Will continue to follow.     Thank you for the referral.

## 2021-09-20 NOTE — DISCHARGE PLANNING
Anticipated Discharge Disposition: SNF     Action/Information: SW met with pt for SNF choice. Pt agreeable to SNF and chooses [facility]. Pt reports she has support from family upon SNF discharge. SW faxed choice form to [facility].     Barriers: SNF acceptance     Plan: HCM will continue to collaborate with pt/family, medical care team, and outpatient providers for ongoing discharge planning support and collaboration.

## 2021-09-20 NOTE — PROGRESS NOTES
Monitor summary: SB-SR 64-71, NE .25, QRS .06, QT .44, with rare PACs per strip from monitor room.

## 2021-09-20 NOTE — THERAPY
Speech Language Pathology   Initial Cognitive-Linguistic Assessment     Patient Name: Ariella Sanchez  AGE:  65 y.o., SEX:  female  Medical Record #: [id number]  Today's Date: 9/20/2021          Assessment    65 y.o. female with PMHx of diabetes, HTN, previous CVA (with residual left-sided weakness) who presented 9/19/2021 with worsening left-sided weakness.  Head CT shows cerebral blood flow less than 30% likely representing completed infarct.       MRI 9/20:    1.  A few small areas of acute infarcts in the bilateral frontal and right occipital lobes.   2.  Encephalomalacia in the right basal ganglia with chronic hemorrhagic products.   3.  Multifocal chronic infarcts.   4.  Moderate chronic microvascular ischemic disease.   5.  Moderate cerebral volume loss      Pt was given a cognitive evaluation for right hemisphere brain damage.  She was AAOx4, and reports she has noticed difficulty at finding her words, however denies any other changes in cognitive function.  Pt was noted to have some hesitations in conversation, with infrequent and mild word finding difficulty.  She independently compensated by using circumlocution and/or revision of sentences.  Pt also demonstrated mild deficits with functional writing at the sentence level, with errors in spelling and word omissions appreciated, although content was good.  Pt reports these deficits are not baseline for her.  Pt was WNL In all other domains tested (see chart below for details).  She verbalized good understanding of her deficits, good verbal problem solving skills and good short term memory.  Pt reports she had a care giver come into her house 3.5 hours per day (mon - fri) to assist with things such as laundry, showering, cleaning and cooking.  At this time, recommend home health for continued speech therapy upon discharge, to address stated deficits in a functional environment (home).  SLP will continue to follow while in the acute care setting.         Plan    Recommend Speech Therapy 3 times per week until therapy goals are met for the following treatments:  Expression Training, Writing Training and Patient / Family / Caregiver Education.    Discharge Recommendations: Recommend home health for continued speech therapy services       Objective     09/20/21 0805   Prior Living Situation   Lives with - Patient's Self Care Capacity Alone and Able to Care For Self   Comments Pt reports she lives at [facility], and gets 17.5 hours of help from a caregiver (3.5 hrs mon-fri) to assist with laundry, showering, cleaning, cooking, etc.  Pt states that she has not been getting that help recently as caregivers are short staffed.   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  Pt independently uses circumlocution to compensate.   Auditory Comprehension   Auditory Comprehension (WDL) WDL   Yes / No Questions: Personal Information Within Functional Limits (6-7)   Yes / No Questions: General Information Within Functional Limits (6-7)   Yes / No Questions: Abstract Within Functional Limits (6-7)   Identifies Objects Within Functional Limits (6-7)   Identifies Pictures Within Functional Limits (6-7)   Follows Two Unit Commands Within Functional Limits (6-7)  "  Follows Three Unit Commands Within Functional Limits (6-7)   Understands Paragraph Within Functional Limits (6-7)   Understands Simple, Structured Conversation  Within Functional Limits (6-7)   Understands Complex Conversation Within Functional Limits (6-7)   Reading Comprehension   Reading Comprehension (WDL) WDL   Reading Long / Multi Paragraph Material Within Functional Limits (6-7)   Written Expression   Written Expression (WDL) X   Functional Writing: Name Within Functional Limits (6-7)   Functional Writing to Dictation: Sentence Supervision (5)   Formulates: Multi Sentence Minimal (4)   Overall Legibility Minimal (4)   Skilled Intervention Compensatory Strategies   Comments Pt reporting handwriting is different that baseline, and she is having trouble \"getting words and letters from her brain to the paper\".   Cognitive-Linguistic   Level of Consciousness Alert   Orientation Level Oriented x 4   Sustained Attention Within Functional Limits (6-7)   Selective Attention Within Functional Limits (6-7)   Short Term Memory Within Functional Limits (6-7)   Simple Reasoning / Problem Solving Within Functional Limits (6-7)   Complex Reasoning  / Problem Solving Within Functional Limits (6-7)   [place] Reasoning Within Functional Limits (6-7)   Abstract Reasoning Within Functional Limits (6-7)   Insight into Deficits Within Functional Limits (6-7)   Executive Functioning / Organization Within Functional Limits (6-7)   Functional Sequencing for ADL / IADLs Within Functional Limits (6-7)   Auditory Math Within Functional Limits (6-7)   Medication Management  To Be Assessed   Social / Pragmatic Communication   Social / Pragmatic Communication WDL   Aphasia Compensatory Strategies   Compensatory Strategies Used To Communicate Stop / Pause Cues;Circumlocution         "

## 2021-09-20 NOTE — DIETARY
NUTRITION SERVICES: BMI - Pt with BMI >40 (=Body mass index is 51.14 kg/m².), Class III obesity. Weight loss counseling not appropriate in acute care setting.     Consult received for Diabetes diet education. Please see education tab. Encouraged healthy well-balanced diet.    RECOMMEND - If appropriate at DC please refer to outpatient nutrition services for weight management.

## 2021-09-20 NOTE — THERAPY
Physical Therapy   Initial Evaluation     Patient Name: Ariella Sanchez  Age:  65 y.o., Sex:  female  Medical Record #: [id number]  Today's Date: 9/20/2021     Precautions  Precautions: Fall Risk    Assessment  Patient is 65 y.o. female who presented acutely c/o worsening L sided weakness.  MRI showed small areas of acute infarcts in B frontal and R occipital lobes, encephalomalacia in R basal ganglia, & multifocal chronic infarcts.  PMH includes diabetes, HTN, hx of CVA with residual L deficits.  Today patient with B LE gorssly 5/5, decreased coordination & motor planning of L LE.   She was able to perform STS with FWW and mod A.  Patient attempted to step with L LE but was unable to sequence progression so stepping deferred.  Patient stands with L toes extended & ankle slightly supinated, unable to coordinate movements to make foot flat on floor.  Patient is below her functional baseline as she is normally independent with all mobility.  Recommend post acute placement to address deficits in balance, coordination, functional mobility, sequencing, and motor planning.    Plan    Recommend Physical Therapy 4 times per week until therapy goals are met for the following treatments:  Bed Mobility, Equipment, Gait Training, Neuro Re-Education / Balance, Self Care/Home Evaluation, Stair Training, Therapeutic Activities and Therapeutic Exercises    DC Equipment Recommendations: Unable to determine at this time  Discharge Recommendations: Recommend post-acute placement for additional physical therapy services prior to discharge home       Objective     09/20/21 0960   Prior Living Situation   Prior Services None   Housing / Facility Independent Living Facility ([facility])   Equipment Owned 4-Wheel Walker;Power Wheel Chair   Lives with - Patient's Self Care Capacity Alone and Able to Care For Self   Comments Pt reports she is IND with all ADLs/IADLs   Prior Level of Functional Mobility   Bed Mobility Independent   Transfer  Status Independent   Ambulation Independent   Distance Ambulation (Feet) (household)   Assistive Devices Used 4-Wheel Walker   Wheelchair Independent   Comments Pt uses 4WW for household distances, power chair when out of the house.  Pt reports she has difficutly getting in/out of bed but is able to do so independently with increased time and effort   Cognition    Cognition / Consciousness WDL   Level of Consciousness Alert   Comments Pleasant & cooperative   Active ROM Lower Body    Active ROM Lower Body  WDL   Strength Lower Body   Lower Body Strength  WDL   Comments B LE grossly 5/5   Sensation Lower Body   Lower Extremity Sensation   X   Comments L LE neuropathy at baseline   Coordination Lower Body    Coordination Lower Body  X   Toe Tapping Left Impaired   Balance Assessment   Sitting Balance (Static) Fair   Sitting Balance (Dynamic) Fair   Standing Balance (Static) Poor +   Standing Balance (Dynamic) Trace +   Weight Shift Sitting Fair   Weight Shift Standing Poor   Comments w/ FWW   Gait Analysis   Gait Level Of Assist Unable to Participate   Weight Bearing Status No restrictions   Comments Pt attempted 1 step forward however was unable to progress step   Bed Mobility    Supine to Sit Minimal Assist   Sit to Supine Moderate Assist   Scooting Moderate Assist   Comments HOB flat   Functional Mobility   Sit to Stand Moderate Assist   Bed, Chair, Wheelchair Transfer Unable to Participate   Mobility EOB, STS x 1   Activity Tolerance   Sitting in Chair NT   Sitting Edge of Bed 6 min   Standing 3 min total   Short Term Goals    Short Term Goal # 1 Pt will perform bed mobility with SPV without use of bed features within 6 visits in order to progress toward PLOF   Short Term Goal # 2 Pt will perform STS/functional transfers with FWW and min A within 6 visits in order to progress OOB activity   Short Term Goal # 3 Pt will ambulate 50 ft with FWW and mod A within 6 visits in order to progress toward PLOF   Session  Information   Date / Session Number  9/20 - 1 (1/4, 9/26)

## 2021-09-20 NOTE — CONSULTS
Physical Medicine and Rehabilitation Consultation              Date of initial consultation: 9/20/2021  Consulting provider: Nell Barton DO   Reason for consultation: assess for acute inpatient rehab appropriateness  LOS: 1 Day(s)    Chief complaint: Generalized weakness     HPI: The patient is a 65 y.o. morbidly obese, right hand dominant female with a past medical history of hypertension, diabetes, previous CVA with left-sided weakness;  who presented on 9/19/2021  9:22 AM with worsening left-sided weakness.  Patient was seen by neurology, NIH score of 3 for left arm, left leg and sensory deficits.  Follow-up MRI brain found scattered areas of acute infarcts in the bilateral frontal and right occipital lobes, as well as multifocal chronic infarcts.  Patient is recent reported that she feels back to her normal baseline with subtle left-sided weakness and peripheral neuropathy.  Neurology work-up was suspicious for occult A. fib, etiology favors cardioembolic, and neurology has signed off.    The patient currently reports poor endurance and difficulty standing and walking. She feels like her strength is returning but her endurance is lacking. She has the ability to hire services at her independent living facility but states they are understaffed. She is supposed to be getting meals, laundry, cleaning, lift assist when needed, and showers. She needs to be able to walk and transfer herself.     ROS  Pertinent positives are mentioned in the HPI, all others reviewed and are negative.    Social Hx:  [facility] [place] [facility] [place]    THERAPY:  Restrictions: Fall risk  PT: Functional mobility   9/20: UAP in gait, mod assist for STS     OT: ADLs  9/20: max assist toileting and LBD    SLP:   9/20: WFL for cog    IMAGING:      MR brain 9/20/2021  1.  A few small areas of acute infarcts in the bilateral frontal and right occipital lobes.  2.  Encephalomalacia in the right basal ganglia with chronic hemorrhagic products.  3.   Multifocal chronic infarcts.  4.  Moderate chronic microvascular ischemic disease.  5.  Moderate cerebral volume loss.    PROCEDURES:  None    PMH:  Past Medical History:   Diagnosis Date   • Anxiety    • Depression    • Diabetes    • Hypertension    • Leg pain    • Neuropathy, peripheral    • Stroke (HCC) 2011       PSH:  Past Surgical History:   Procedure Laterality Date   • FEMUR ORIF Left 12/19/2016    Procedure: FEMUR ORIF - Distal ;  Surgeon: Mango Elizondo M.D.;  Location: SURGERY [facility];  Service:    • TONSILLECTOMY     • TUBAL LIGATION         FHX:  Non-pertinent to today's issues    Medications:  Current Facility-Administered Medications   Medication Dose   • senna-docusate (PERICOLACE or SENOKOT S) 8.6-50 MG per tablet 2 Tablet  2 Tablet    And   • polyethylene glycol/lytes (MIRALAX) PACKET 1 Packet  1 Packet    And   • magnesium hydroxide (MILK OF MAGNESIA) suspension 30 mL  30 mL    And   • bisacodyl (DULCOLAX) suppository 10 mg  10 mg   • acetaminophen (Tylenol) tablet 650 mg  650 mg   • Allow for permissive hypertension: SBP up to 220 mmHg/DBP to 120 mmHg; If SBP greater than 220 mmHg or DBP greater than 120 mmHg administer PRN antihypertensive medications.     • atorvastatin (LIPITOR) tablet 80 mg  80 mg   • aspirin (ASA) tablet 325 mg  325 mg    Or   • aspirin (ASA) chewable tab 324 mg  324 mg    Or   • aspirin (ASA) suppository 300 mg  300 mg   • labetalol (NORMODYNE/TRANDATE) injection 10 mg  10 mg   • hydrALAZINE (APRESOLINE) injection 10 mg  10 mg   • gabapentin (NEURONTIN) capsule 300 mg  300 mg   • insulin regular (HumuLIN R,NovoLIN R) injection  1-6 Units    And   • glucose 4 g chewable tablet 16 g  16 g    And   • dextrose 50% (D50W) injection 50 mL  50 mL       Allergies:  Allergies   Allergen Reactions   • Metformin Nausea     GI upset. No hypersensitivity symptoms.   RXN=unknown         Physical Exam:  Vitals: BP (!) 198/111   Pulse 70   Temp 36.2 °C (97.2 °F) (Temporal)  "  Resp 18   Ht 1.702 m (5' 7\")   Wt (!) 148 kg (326 lb 8 oz)   SpO2 97%   Gen: NAD  Head:  NC/AT  Eyes/ Nose/ Mouth: PERRLA, moist mucous membranes  Cardio: RRR, good distal perfusion, warm extremities  Pulm: normal respiratory effort, no cyanosis   Abd: Soft NTND, negative borborygmi   Ext: No peripheral edema. No calf tenderness. No clubbing.    Mental status: answers questions appropriately follows commands  Speech: fluent, no aphasia or dysarthria      Motor:      Upper Extremity  Myotome R L   Shoulder flexion C5 5 5   Elbow flexion C5 5 5   Wrist extension C6 5 5   Elbow extension C7 5 5   Finger flexion C8 5 5   Finger abduction T1 5 5     Lower Extremity Myotome R L   Hip flexion L2 5 5   Knee extension L3 5 5   Ankle dorsiflexion L4 5 5   Toe extension L5 5 5   Ankle plantarflexion S1 5 5     Sensory:   intact to light touch through out    Tone: no spasticity noted, no cogwheeling noted    Coordination:   intact finger moses bilaterally    Labs: Reviewed and significant for   Recent Labs     09/19/21  0939   RBC 4.91   HEMOGLOBIN 14.7   HEMATOCRIT 44.6   PLATELETCT 260   PROTHROMBTM 14.8*   APTT 29.6   INR 1.20*     Recent Labs     09/19/21  0939   SODIUM 135   POTASSIUM 4.5   CHLORIDE 100   CO2 24   GLUCOSE 173*   BUN 12   CREATININE 0.74   CALCIUM 9.1     Recent Results (from the past 24 hour(s))   URINALYSIS    Collection Time: 09/19/21 12:24 PM    Specimen: Urine, Clean Catch   Result Value Ref Range    Color Yellow     Character Cloudy (A)     Specific Gravity 1.030 <1.035    Ph 7.5 5.0 - 8.0    Glucose Negative Negative mg/dL    Ketones Negative Negative mg/dL    Protein Negative Negative mg/dL    Bilirubin Negative Negative    Urobilinogen, Urine 1.0 Negative    Nitrite Positive (A) Negative    Leukocyte Esterase Negative Negative    Occult Blood Trace (A) Negative    Micro Urine Req Microscopic    URINE MICROSCOPIC (W/UA)    Collection Time: 09/19/21 12:24 PM   Result Value Ref Range    WBC 0-2 " /hpf    RBC 0-2 /hpf    Bacteria Many (A) None /hpf    Epithelial Cells Negative /hpf    Hyaline Cast 0-2 /lpf   EC-ECHOCARDIOGRAM COMPLETE W/ CONT    Collection Time: 09/19/21  3:03 PM   Result Value Ref Range    Eject.Frac. MOD BP 60.61     Eject.Frac. MOD 4C 61.42     Eject.Frac. MOD 2C 56.1     Left Ventrical Ejection Fraction 60    POCT glucose device results    Collection Time: 09/19/21  7:56 PM   Result Value Ref Range    Glucose - Accu-Ck 149 (H) 65 - 99 mg/dL   Lipid Profile (Lipid Panel) Fasting    Collection Time: 09/20/21  2:28 AM   Result Value Ref Range    Cholesterol,Tot 189 100 - 199 mg/dL    Triglycerides 93 0 - 149 mg/dL    HDL 51 >=40 mg/dL     (H) <100 mg/dL   POCT glucose device results    Collection Time: 09/20/21  6:26 AM   Result Value Ref Range    Glucose - Accu-Ck 140 (H) 65 - 99 mg/dL         ASSESSMENT:  Patient is a 65 y.o. female admitted with weakness, found to have acute infarcts in the bilateral frontal and right occipital lobes.    [facility] Code / Diagnosis to Support: 0001.4 - Stroke: No Paresis    Rehabilitation: Impaired ADLs and mobility  Patient is not a candidate for inpatient rehab because he is not expected to have a reasonable amount of improvement in a reasonable amount of time using the combined approach.     Additional Recommendations:  - Recommend SNF placement. Patient is requesting [facility]. She states she has been there before and would like to return. She has some help available at her ILF but it sounds like care is inconsistent based on staffing levels so she would benefit more from a longer, slower recovery to build up her endurance prior to returning home.   - Continue PT/OT while in house   - ASA and statin for secondary stroke prevention   - tight control of BP and BG  - Ok to continue gabapentin at current dose  -PMR will sign off, please reconsult or reach out via Voalte if further evaluation or medical management is requested      Medical  Complexity:  Hypertension      DVT PPX: Lovenox 60mg inj BID      Thank you for allowing us to participate in the care of this patient.     Patient was seen for 83 minutes on unit/floor of which > 50% of time was spent on counseling and coordination of care regarding the above, including prognosis, risk reduction, benefits of treatment, and options for next stage of care.    Slava Metz, DO   Physical Medicine and Rehabilitation     Please note that this dictation was created using voice recognition software. I have made every reasonable attempt to correct obvious errors, but there may be errors of grammar and possibly content that I did not discover before finalizing the note.

## 2021-09-20 NOTE — PROGRESS NOTES
4 Eyes Skin Assessment Completed by KHUSHI John and Kashif RN.    Head WDL  Ears WDL  Nose WDL  Mouth WDL  Neck WDL  Breast/Chest WDL  Shoulder Blades WDL  Spine WDL  (R) Arm/Elbow/Hand WDL  (L) Arm/Elbow/Hand WDL  Abdomen WDL  Groin WDL  Scrotum/Coccyx/Buttocks WDL  (R) Leg WDL  (L) Leg Scar  (R) Heel/Foot/Toe WDL  (L) Heel/Foot/Toe WDL          Devices In Places Tele Box and SCD's      Interventions In Place Pillows and Pressure Redistribution Mattress    Possible Skin Injury No    Pictures Uploaded Into Epic N/A  Wound Consult Placed N/A  RN Wound Prevention Protocol Ordered No

## 2021-09-20 NOTE — THERAPY
Occupational Therapy   Initial Evaluation     Patient Name: Ariella Sanchez  Age:  65 y.o., Sex:  female  Medical Record #: [id number]  Today's Date: 9/20/2021     Precautions  Precautions: (P) Fall Risk    Assessment  Patient is 65 y.o. female with a diagnosis of L side weakness.  Pt currently limited by decreased functional mobility, activity tolerance, strength, AROM, balance, which are affecting pt's ability to complete ADLs/IADLs at baseline. Pt would benefit from OT services in the acute care setting to maximize functional recovery.     Plan    Recommend Occupational Therapy 4 times per week until therapy goals are met for the following treatments:  Self Care/Activities of Daily Living and Therapeutic Activities.       Discharge Recommendations: (P) Recommend post-acute placement for additional occupational therapy services prior to discharge home        09/20/21 1010   Prior Living Situation   Prior Services None   Housing / Facility Independent Living Facility   Steps Into Home 0   Steps In Home 0   Equipment Owned 4-Wheel Walker;Power Wheel Chair;Tub Transfer Bench   Lives with - Patient's Self Care Capacity Alone and Able to Care For Self   Prior Level of ADL Function   Self Feeding Independent   Grooming / Hygiene Independent   Bathing Independent   Dressing Independent   Toileting Independent   ADL Assessment   Grooming Minimal Assist   Upper Body Dressing Minimal Assist   Lower Body Dressing Maximal Assist   Toileting Maximal Assist   Functional Mobility   Sit to Stand Moderate Assist   Bed, Chair, Wheelchair Transfer Unable to Participate   Short Term Goals   Short Term Goal # 1 supervised with UB dressing   Short Term Goal # 2 min A with LB dressing   Short Term Goal # 3 min A with ADL txfs

## 2021-09-20 NOTE — DISCHARGE PLANNING
[facility] Transitional Care Coordination    Physitry consult complete.  Dr. Metz recommending -     Patient is not a candidate for inpatient rehab because he is not expected to have a reasonable amount of improvement in a reasonable amount of time using the combined approach.   Recommend SNF placement. Patient is requesting [facility].    [facility] will no longer follow.      Thank you for the referral.

## 2021-09-21 ENCOUNTER — PATIENT OUTREACH (OUTPATIENT)
Dept: HEALTH INFORMATION MANAGEMENT | Facility: OTHER | Age: 66
End: 2021-09-21

## 2021-09-21 PROBLEM — I47.29 NONSUSTAINED PAROXYSMAL VENTRICULAR TACHYCARDIA (HCC): Status: ACTIVE | Noted: 2021-09-21

## 2021-09-21 LAB
GLUCOSE BLD-MCNC: 157 MG/DL (ref 65–99)
GLUCOSE BLD-MCNC: 173 MG/DL (ref 65–99)
GLUCOSE BLD-MCNC: 189 MG/DL (ref 65–99)
GLUCOSE BLD-MCNC: 208 MG/DL (ref 65–99)

## 2021-09-21 PROCEDURE — 99232 SBSQ HOSP IP/OBS MODERATE 35: CPT | Performed by: HOSPITALIST

## 2021-09-21 PROCEDURE — 700102 HCHG RX REV CODE 250 W/ 637 OVERRIDE(OP): Performed by: NURSE PRACTITIONER

## 2021-09-21 PROCEDURE — 82962 GLUCOSE BLOOD TEST: CPT

## 2021-09-21 PROCEDURE — 700102 HCHG RX REV CODE 250 W/ 637 OVERRIDE(OP): Performed by: INTERNAL MEDICINE

## 2021-09-21 PROCEDURE — A9270 NON-COVERED ITEM OR SERVICE: HCPCS | Performed by: INTERNAL MEDICINE

## 2021-09-21 PROCEDURE — 700111 HCHG RX REV CODE 636 W/ 250 OVERRIDE (IP): Performed by: INTERNAL MEDICINE

## 2021-09-21 PROCEDURE — A9270 NON-COVERED ITEM OR SERVICE: HCPCS | Performed by: HOSPITALIST

## 2021-09-21 PROCEDURE — 700102 HCHG RX REV CODE 250 W/ 637 OVERRIDE(OP): Performed by: HOSPITALIST

## 2021-09-21 PROCEDURE — A9270 NON-COVERED ITEM OR SERVICE: HCPCS | Performed by: NURSE PRACTITIONER

## 2021-09-21 PROCEDURE — 700101 HCHG RX REV CODE 250: Performed by: INTERNAL MEDICINE

## 2021-09-21 PROCEDURE — 770020 HCHG ROOM/CARE - TELE (206)

## 2021-09-21 RX ORDER — GABAPENTIN 300 MG/1
300 CAPSULE ORAL 2 TIMES DAILY
Qty: 90 CAPSULE | Status: SHIPPED
Start: 2021-09-21 | End: 2021-11-01 | Stop reason: SDUPTHER

## 2021-09-21 RX ORDER — ASPIRIN 81 MG/1
81 TABLET, CHEWABLE ORAL DAILY
Qty: 100 TABLET | Status: SHIPPED
Start: 2021-09-22 | End: 2023-03-29 | Stop reason: SDUPTHER

## 2021-09-21 RX ORDER — GLYBURIDE 5 MG/1
5 TABLET ORAL
Qty: 30 TABLET | Status: SHIPPED
Start: 2021-09-21 | End: 2021-11-01 | Stop reason: SDUPTHER

## 2021-09-21 RX ORDER — ATORVASTATIN CALCIUM 80 MG/1
80 TABLET, FILM COATED ORAL EVERY EVENING
Qty: 30 TABLET | Status: SHIPPED
Start: 2021-09-21 | End: 2021-11-01 | Stop reason: SDUPTHER

## 2021-09-21 RX ORDER — POLYETHYLENE GLYCOL 3350 17 G/17G
17 POWDER, FOR SOLUTION ORAL
Refills: 3 | Status: SHIPPED
Start: 2021-09-21 | End: 2022-11-11

## 2021-09-21 RX ORDER — ACETAMINOPHEN 325 MG/1
650 TABLET ORAL EVERY 6 HOURS PRN
Qty: 30 TABLET | Refills: 0 | Status: SHIPPED
Start: 2021-09-21

## 2021-09-21 RX ORDER — AMOXICILLIN 250 MG
2 CAPSULE ORAL 2 TIMES DAILY
Qty: 30 TABLET | Refills: 0 | Status: SHIPPED
Start: 2021-09-21

## 2021-09-21 RX ORDER — METOPROLOL TARTRATE 50 MG/1
50 TABLET, FILM COATED ORAL 2 TIMES DAILY
Qty: 60 TABLET | Refills: 3 | Status: SHIPPED
Start: 2021-09-21 | End: 2021-11-01 | Stop reason: SDUPTHER

## 2021-09-21 RX ADMIN — AMLODIPINE BESYLATE 10 MG: 5 TABLET ORAL at 05:22

## 2021-09-21 RX ADMIN — METOPROLOL TARTRATE 50 MG: 25 TABLET, FILM COATED ORAL at 18:05

## 2021-09-21 RX ADMIN — GABAPENTIN 300 MG: 300 CAPSULE ORAL at 05:23

## 2021-09-21 RX ADMIN — ENOXAPARIN SODIUM 60 MG: 60 INJECTION, SOLUTION INTRAVENOUS; SUBCUTANEOUS at 16:57

## 2021-09-21 RX ADMIN — GABAPENTIN 300 MG: 300 CAPSULE ORAL at 16:57

## 2021-09-21 RX ADMIN — ENOXAPARIN SODIUM 60 MG: 60 INJECTION, SOLUTION INTRAVENOUS; SUBCUTANEOUS at 05:22

## 2021-09-21 RX ADMIN — ATORVASTATIN CALCIUM 80 MG: 80 TABLET, FILM COATED ORAL at 16:57

## 2021-09-21 RX ADMIN — ASPIRIN 81 MG: 81 TABLET, CHEWABLE ORAL at 05:23

## 2021-09-21 RX ADMIN — LABETALOL HYDROCHLORIDE 10 MG: 5 INJECTION, SOLUTION INTRAVENOUS at 08:10

## 2021-09-21 RX ADMIN — LABETALOL HYDROCHLORIDE 10 MG: 5 INJECTION, SOLUTION INTRAVENOUS at 04:33

## 2021-09-21 ASSESSMENT — PAIN DESCRIPTION - PAIN TYPE: TYPE: ACUTE PAIN

## 2021-09-21 NOTE — CONSULTS
Diabetes education: Met with pt this evening. Pt has a hx of diabetes/pre diabetes, per chart, allergic to Metformin, and admitted with blood sugar of 173, and hg a1c of 7.0%.  Pt is currently on regular insulin sliding scale coverage ac and hs with blood sugars of 140, 202 ( 2 units) and 129.  Reviewed diabetes, pre diabetes, goals for blood sugars, what effects blood sugars, need for activity and stress management. Discussed eating 3 meals with controlled carbohydrates and proteins ( she does not eat breakfast) and why. Discussed finger sticks and pt does not want to test blood sugars.  Plan: Pt has a [facility] DM book at bedside. Questions answered. No further needs at this time. Please text vial Voalte if needs change.

## 2021-09-21 NOTE — PROGRESS NOTES
Monitor summary: SR 60-77, CA .21, QRS .08, QT .42, with rare PVCs and PACs, and a 10 second run of VT per strip from monitor room.

## 2021-09-21 NOTE — DISCHARGE PLANNING
Agency/Facility Name: [facility]  Spoke To: Iliana  Outcome: Per iliana bed is available today, RNCM notified and will set up transport for 1400.    1210  Agency/Facility Name: [facility]  Spoke To: Iliana  Outcome: Per iliana they cant take the pt until tomorrow morning as Medicare has a 3 night minimum. RNCM will set up transport for 0900 tomorrow. DPA will also call iliana in the morning.

## 2021-09-21 NOTE — PROGRESS NOTES
Diabetes education: Met with pt this evening. Please see consult note.  Plan: Pt has a [facility] DM book at bedside. Questions answered. No further needs at this time. Please text vial Voalte if needs change.

## 2021-09-21 NOTE — CARE PLAN
The patient is Stable - Low risk of patient condition declining or worsening    Shift Goals  Clinical Goals: Stable neuro exam  Patient Goals: Comfort  Family Goals: RYAN    Progress made toward(s) clinical / shift goals:  Pt reports improving left sided numbness/tingling. Stable neuro exam.     Problem: Self Care  Goal: Patient will have the ability to perform ADLs independently or with assistance (bathe, groom, dress, toilet and feed)  Outcome: Progressing  Note: Pt able to preform ADL's with set-up.      Problem: Skin Integrity  Goal: Skin integrity is maintained or improved  Outcome: Progressing  Note: Pt on VIRGIL mattress. Turns self from side to side. Purewick in place.      Patient is not progressing towards the following goals:      Problem: Mobility - Stroke  Goal: Patient's capacity to carry out activities will improve  Outcome: Not Progressing  Note: Pt max assist for mobility. Encouraging ROM exercises and mobility as tolerated.

## 2021-09-21 NOTE — PROGRESS NOTES
Monitor summary: SB-SR 59-79, FL 0.17, QRS 0.08, QT 0.38, with rare PVCs per strip from monitor room.

## 2021-09-21 NOTE — DISCHARGE SUMMARY
Discharge Summary    CHIEF COMPLAINT ON ADMISSION  Chief Complaint   Patient presents with   • Numbness       Reason for Admission  CVA    CODE STATUS  DNAR/DNI    HPI & HOSPITAL COURSE  This is a 65 y.o. female here with relevant history of right basal ganglia hemorrhagic stroke (2011) with residual left sided weakness and neuropathic pain, previous lacunar infarct left basal ganglia infarct (asymptomatic), DM type II, HTN, morbid obesity, and medication noncompliance (off all medication for over 1 year due to pandemic per patient report) who presented 9/19/21 for worsening left sided weakness and numbness whom neurology was consulted to address possible acute stroke. CTA head and neck w/wo contrast showed atherosclerosis of bilateral cervical carotids without high grade stenosis, no large vessel occlusion, aneurysm, or dissection. MRI brain wo contrast revealed small multifocal acute infarcts including the right frontal, right parietal/occipital lobe, and left basal ganglia without hemorrhagic transformation. Additionally, TTE showed EF 60 %, moderate left ventricular hypertrophy, no valvular abnormalities, and mild left atrial dilation the latter of which may be suggestive of occult Afib. Etiology is suspicious for cardioembolic etiology given the presence of small multifocal acute infarcts in bilateral hemispheres of multiple vascular territories.     Ziopatch placed at discharge to SNF.  Her mentation had improved, however patient requesting regular not diabetic diet.  I did remind her of her recent recurrent stroke due to DM2, HTN noncompliance.  Hg a1c 7 %.  She remains with upper arm weakness, but able to eat.  PT/OT recommending SNF.  She has been accepted to [facility] on 9/22.  Started on glyburide 5mg po daily since she will likely not be compliant with insulin at home.  Per diabetic educator, patient has no intention of starting insulin.  She did have runs of nonsustained vtach overnight, therefore  her norvasc was changed to metoprolol 50mg po bid for better rate control and prevention of vtach.    On 9/22, no further runs of vtach, HR 58-74, /72, added lisinopril for renal protection with diabetes mellitus.    Therefore, she is discharged in good and stable condition to skilled nursing facility.    The patient met 2-midnight criteria for an inpatient stay at the time of discharge.      FOLLOW UP ITEMS POST DISCHARGE  Follow up with [facility]    DISCHARGE DIAGNOSES  Principal Problem:    Left-sided weakness POA: Yes  Active Problems:    Obesity POA: Yes    DM (diabetes mellitus) (CMS-HCC) POA: Yes    HTN (hypertension) POA: Yes    Impaired mobility and ADLs POA: Yes    Dyslipidemia POA: Yes    CVA (cerebral vascular accident) ([facility]) POA: Yes    Non-compliant patient POA: Yes    Advance care planning POA: Yes    Nonsustained paroxysmal ventricular tachycardia (HCC) POA: Yes  Resolved Problems:    * No resolved hospital problems. *      FOLLOW UP  No future appointments.  Ever Chamberlain M.D.  [address] [facility] [facility] [id number]  [phone]      Please call your primary care provider to schedule a hospital follow up. Thank you      MEDICATIONS ON DISCHARGE     Medication List      START taking these medications      Instructions   acetaminophen 325 MG Tabs  Commonly known as: Tylenol   Take 2 Tablets by mouth every 6 hours as needed for Mild Pain or Fever.  Dose: 650 mg     aspirin 81 MG Chew chewable tablet  Commonly known as: ASA   Chew 1 Tablet every day.  Dose: 81 mg     atorvastatin 80 MG tablet  Commonly known as: LIPITOR   Take 1 Tablet by mouth every evening.  Dose: 80 mg     enoxaparin 60 MG/0.6ML Soln inj  Commonly known as: LOVENOX   Inject 60 mg under the skin 2 times a day.  Dose: 60 mg     gabapentin 300 MG Caps  Commonly known as: NEURONTIN   Take 1 Capsule by mouth 2 times a day.  Dose: 300 mg     glucose 4 g chewable tablet   Chew 4 Tablets as needed for Low Blood Sugar (If  FSBG is less than or equal to 70 mg/dL and patient able to eat or drink).  Dose: 16 g     glyBURIDE 5 MG Tabs  Commonly known as: DIABETA   Take 1 Tablet by mouth every morning with breakfast.  Dose: 5 mg     insulin regular 100 Unit/mL Soln  Commonly known as: HumuLIN R   Inject 1-6 Units under the skin 4 Times a Day,Before Meals and at Bedtime.  Dose: 1-6 Units     lisinopril 5 MG Tabs  Commonly known as: PRINIVIL   Take 1 Tablet by mouth every day.  Dose: 5 mg     magnesium hydroxide 400 MG/5ML Susp  Commonly known as: MILK OF MAGNESIA   Take 30 mL by mouth 1 time a day as needed (if polyethylene glycol ineffective after 24 hours).  Dose: 30 mL     metoprolol tartrate 50 MG Tabs  Commonly known as: LOPRESSOR   Take 1 Tablet by mouth 2 times a day.  Dose: 50 mg     polyethylene glycol/lytes 17 g Pack  Commonly known as: MIRALAX   Take 1 Packet by mouth 1 time a day as needed (if sennosides and docusate ineffective after 24 hours).  Dose: 17 g     senna-docusate 8.6-50 MG Tabs  Commonly known as: PERICOLACE or SENOKOT S   Take 2 Tablets by mouth 2 times a day.  Dose: 2 Tablet        STOP taking these medications    ibuprofen 200 MG Tabs  Commonly known as: MOTRIN            Allergies  Allergies   Allergen Reactions   • Metformin Nausea     GI upset. No hypersensitivity symptoms.   RXN=unknown       DIET  Orders Placed This Encounter   Procedures   • Diet Order Diet: Consistent CHO (Diabetic)     Standing Status:   Standing     Number of Occurrences:   1     Order Specific Question:   Diet:     Answer:   Consistent CHO (Diabetic) [4]       ACTIVITY  As tolerated and directed by skilled nursing.  Weight bearing as tolerated    LINES, DRAINS, AND WOUNDS  This is an automated list. Peripheral IVs will be removed prior to discharge.  Peripheral IV 09/19/21 20 G Right;Medial Forearm (Active)   Site Assessment Clean;Dry;Intact 09/21/21 0800   Dressing Type Transparent;Occlusive 09/21/21 0800   Line Status Saline  locked;Scrubbed the hub prior to access;Flushed;Blood return noted 09/21/21 0800   Dressing Status Clean;Dry;Intact 09/21/21 0800   Dressing Intervention N/A 09/21/21 0800   Infiltration Grading ([facility], [facility]) 0 09/21/21 0800   Phlebitis Scale ([facility] Only) 0 09/21/21 0800     External Urinary Catheter (Female Wick) (Active)   Collection Container Suction container 09/20/21 2000   Suction Level (Female Wick Catheter) 80 mmHg 09/20/21 2000   Output (mL) 500 mL 09/21/21 0500         Peripheral IV 09/19/21 20 G Right;Medial Forearm (Active)   Site Assessment Clean;Dry;Intact 09/21/21 0800   Dressing Type Transparent;Occlusive 09/21/21 0800   Line Status Saline locked;Scrubbed the hub prior to access;Flushed;Blood return noted 09/21/21 0800   Dressing Status Clean;Dry;Intact 09/21/21 0800   Dressing Intervention N/A 09/21/21 0800   Infiltration Grading ([facility], [facility]) 0 09/21/21 0800   Phlebitis Scale ([facility] Only) 0 09/21/21 0800               MENTAL STATUS ON TRANSFER             CONSULTATIONS  Neurology  Diabetic educator  rehab    PROCEDURES  9/20/2021 1:04 AM     HISTORY/REASON FOR EXAM:  Neuro deficit, acute, stroke suspected. Worsening left-sided weakness        TECHNIQUE/EXAM DESCRIPTION:  MRI of the brain without contrast.     T1 sagittal, T2 fast spin-echo axial, T1 coronal, FLAIR coronal, diffusion-weighted and apparent diffusion coefficient (ADC map) axial images were obtained of the whole brain.     The study was performed on a [employer] 1.5 Aggie MRI scanner.     COMPARISON:  5/11/2011     FINDINGS: There are few areas of acute infarcts in the left frontal, right frontal and right parietal lobes. There is an area of encephalomalacia in the right basal ganglia with chronic hemorrhagic products. Nonspecific T2 hyperintensities are noted in   the subcortical and periventricular white matter. Moderate cerebral volume loss is seen. There are chronic infarcts in the deep cerebral white matter. There is no  intra-axial space-occupying lesion. An empty sella is seen.     The ventricles, cortical sulci and the basal cisterns are prominent consistent with moderate cerebral volume loss. The visualized flow voids of the cerebral vasculature are unremarkable.  There is no large lesion identified in the expected course of the   intracranial portions of the cranial nerves.     The extracranial soft tissue including orbits appear grossly normal.  The skull bones are unremarkable. The paranasal sinuses are clear.        IMPRESSION:     1.  A few small areas of acute infarcts in the bilateral frontal and right occipital lobes.  2.  Encephalomalacia in the right basal ganglia with chronic hemorrhagic products.  3.  Multifocal chronic infarcts.  4.  Moderate chronic microvascular ischemic disease.  5.  Moderate cerebral volume loss.        Exam Ended: 09/20/21  1:30 AM Last Resulted: 09/20/21  9:00 AM        Transthoracic  Echo Report        Echocardiography Laboratory     CONCLUSIONS  No prior study is available for comparison.   Moderate concentric left ventricular hypertrophy. Normal left   ventricular systolic function. The left ventricular ejection fraction   is visually estimated to be 60%.  No evidence of valvular abnormality based on Doppler evaluation.      GERA BAJWA  Exam Date:         09/19/2021                      14:08  Exam Location:     Inpatient  Priority:          Routine    LABORATORY  Lab Results   Component Value Date    SODIUM 135 09/19/2021    POTASSIUM 4.5 09/19/2021    CHLORIDE 100 09/19/2021    CO2 24 09/19/2021    GLUCOSE 173 (H) 09/19/2021    BUN 12 09/19/2021    CREATININE 0.74 09/19/2021        Lab Results   Component Value Date    WBC 6.9 09/19/2021    HEMOGLOBIN 14.7 09/19/2021    HEMATOCRIT 44.6 09/19/2021    PLATELETCT 260 09/19/2021        Total time of the discharge process exceeds 40 minutes.

## 2021-09-21 NOTE — DISCHARGE PLANNING
Received Transport Form @ 3806  Spoke to Com[facility] @ [facility]    Transport is scheduled for 9/22/21 @0900 [facility].      Informed care team of transport via [employer].    [facility] trip #: [id number]

## 2021-09-21 NOTE — CARE PLAN
The patient is Stable - Low risk of patient condition declining or worsening    Shift Goals  Clinical Goals: Maintain neurological status  Patient Goals: Diminish numbness/tingling in L extremities  Family Goals: RYAN    Progress made toward(s) clinical / shift goals: Assumed care of patient at 1900. Patient is A&Ox4, Q4hr neuro checks are being completed. NIHSS = 2 for sensory loss and tactile inattention. Patient reports numbness and tingling are improving. Waffle cushion overlay in place to maintain skin integrity. Bed is low and locked, bed alarm is on, call light is within reach, hourly rounding continues.     Problem: Optimal Care of the Stroke Patient  Goal: Optimal acute care for the stroke patient  Outcome: Progressing     Problem: Knowledge Deficit - Stroke Education  Goal: Patient's knowledge of stroke and risk factors will improve  Outcome: Progressing       Patient is not progressing towards the following goals: N/A.

## 2021-09-22 VITALS
DIASTOLIC BLOOD PRESSURE: 72 MMHG | BODY MASS INDEX: 45.99 KG/M2 | HEIGHT: 67 IN | TEMPERATURE: 96.9 F | WEIGHT: 293 LBS | HEART RATE: 64 BPM | SYSTOLIC BLOOD PRESSURE: 159 MMHG | OXYGEN SATURATION: 93 % | RESPIRATION RATE: 18 BRPM

## 2021-09-22 LAB — GLUCOSE BLD-MCNC: 165 MG/DL (ref 65–99)

## 2021-09-22 PROCEDURE — 700111 HCHG RX REV CODE 636 W/ 250 OVERRIDE (IP): Performed by: INTERNAL MEDICINE

## 2021-09-22 PROCEDURE — A9270 NON-COVERED ITEM OR SERVICE: HCPCS | Performed by: INTERNAL MEDICINE

## 2021-09-22 PROCEDURE — 90471 IMMUNIZATION ADMIN: CPT

## 2021-09-22 PROCEDURE — 700111 HCHG RX REV CODE 636 W/ 250 OVERRIDE (IP): Performed by: HOSPITALIST

## 2021-09-22 PROCEDURE — A9270 NON-COVERED ITEM OR SERVICE: HCPCS | Performed by: HOSPITALIST

## 2021-09-22 PROCEDURE — 700102 HCHG RX REV CODE 250 W/ 637 OVERRIDE(OP): Performed by: HOSPITALIST

## 2021-09-22 PROCEDURE — A9270 NON-COVERED ITEM OR SERVICE: HCPCS | Performed by: NURSE PRACTITIONER

## 2021-09-22 PROCEDURE — 82962 GLUCOSE BLOOD TEST: CPT

## 2021-09-22 PROCEDURE — 90662 IIV NO PRSV INCREASED AG IM: CPT | Performed by: HOSPITALIST

## 2021-09-22 PROCEDURE — 700102 HCHG RX REV CODE 250 W/ 637 OVERRIDE(OP): Performed by: INTERNAL MEDICINE

## 2021-09-22 PROCEDURE — 700102 HCHG RX REV CODE 250 W/ 637 OVERRIDE(OP): Performed by: NURSE PRACTITIONER

## 2021-09-22 PROCEDURE — 99239 HOSP IP/OBS DSCHRG MGMT >30: CPT | Performed by: HOSPITALIST

## 2021-09-22 RX ORDER — LISINOPRIL 5 MG/1
5 TABLET ORAL DAILY
Qty: 30 TABLET | Refills: 3 | Status: SHIPPED
Start: 2021-09-22 | End: 2022-11-11

## 2021-09-22 RX ADMIN — ENOXAPARIN SODIUM 60 MG: 60 INJECTION, SOLUTION INTRAVENOUS; SUBCUTANEOUS at 04:21

## 2021-09-22 RX ADMIN — GABAPENTIN 300 MG: 300 CAPSULE ORAL at 04:21

## 2021-09-22 RX ADMIN — INFLUENZA A VIRUS A/VICTORIA/2570/2019 IVR-215 (H1N1) ANTIGEN (FORMALDEHYDE INACTIVATED), INFLUENZA A VIRUS A/TASMANIA/503/2020 IVR-221 (H3N2) ANTIGEN (FORMALDEHYDE INACTIVATED), INFLUENZA B VIRUS B/PHUKET/3073/2013 ANTIGEN (FORMALDEHYDE INACTIVATED), AND INFLUENZA B VIRUS B/WASHINGTON/02/2019 ANTIGEN (FORMALDEHYDE INACTIVATED) 0.7 ML: 60; 60; 60; 60 INJECTION, SUSPENSION INTRAMUSCULAR at 04:22

## 2021-09-22 RX ADMIN — METOPROLOL TARTRATE 50 MG: 25 TABLET, FILM COATED ORAL at 04:21

## 2021-09-22 RX ADMIN — ASPIRIN 81 MG: 81 TABLET, CHEWABLE ORAL at 04:21

## 2021-09-22 ASSESSMENT — ENCOUNTER SYMPTOMS
HEADACHES: 0
MYALGIAS: 0
DEPRESSION: 0
NAUSEA: 0
SHORTNESS OF BREATH: 0
FEVER: 0
WEAKNESS: 1
DIZZINESS: 0
ABDOMINAL PAIN: 0
VOMITING: 0
CHILLS: 0

## 2021-09-22 NOTE — DISCHARGE PLANNING
Anticipated Discharge Disposition:   [facility]  [facility]    Action:    Pt admitted with stroke, right frontal, right parietal/occipital, left basal ganglia.    RN CM spoke with patient this a.m.  She was accepted to [facility] for rehab.  Pt stated she has been there before and her brother lives nearby and her Senior Living apartment, [address] is also close.  Pt agreeable to transfer today via [facility] at 0900.  Verbal for [facility] obtained.    C[facility]ra signed by Dr. Borges.    DC packet placed in S 196-2 hard chart compartment.    Barriers to Discharge:    None    Plan:    DC  Care Transition Team Assessment    Information Source  Orientation Level: Oriented X4  Information Given By: Patient  Who is responsible for making decisions for patient? : Patient    Readmission Evaluation  Is this a readmission?: No    Elopement Risk  Legal Hold: No  Ambulatory or Self Mobile in Wheelchair: No-Not an Elopement Risk  Elopement Risk: Not at Risk for Elopement    Interdisciplinary Discharge Planning  Lives with - Patient's Self Care Capacity: Alone and Able to Care For Self  Patient or legal guardian wants to designate a caregiver: No  Support Systems: Friends / Neighbors, Family Member(s)  Housing / Facility: Independent Living Facility  Name of Care Facility: [facility]  Prior Services: None  Durable Medical Equipment: Walker    Discharge Preparedness  What is your plan after discharge?: Skilled nursing facility  What are your discharge supports?: Sibling  Prior Functional Level: Ambulatory, Independent with Activities of Daily Living, Independent with Medication Management, Uses Walker, Uses Wheelchair  Difficulity with ADLs: Bathing, Dressing, Toileting, Walking  Difficulity with IADLs: Cooking, Driving, Laundry, Managing medication, Shopping    Functional Assesment  Prior Functional Level: Ambulatory, Independent with Activities of Daily Living, Independent with Medication Management, Uses Walker,  Uses Wheelchair    Finances  Financial Barriers to Discharge: No  Prescription Coverage: Yes    Vision / Hearing Impairment  Vision Impairment : No  Hearing Impairment : No         Advance Directive  Advance Directive?: None    Domestic Abuse  Have you ever been the victim of abuse or violence?: No  Physical Abuse or Sexual Abuse: No  Verbal Abuse or Emotional Abuse: No  Possible Abuse/Neglect Reported to:: Not Applicable              Anticipated Discharge Information  Discharge Disposition: D/T to SNF with Medicare cert in anticipation of skilled care (03)  Discharge Contact Phone Number: [phone]

## 2021-09-22 NOTE — CARE PLAN
The patient is Stable - Low risk of patient condition declining or worsening    Shift Goals  Clinical Goals: Maintain neurological status   Patient Goals: Discharge to SNF   Family Goals: RYAN    Progress made toward(s) clinical / shift goals: Assumed care of patient at 1900. Patient is A&Ox4, Q4hr neuro checks are being completed. NIHSS = 2 for L sided sensory loss and tactile inattention. Patient reports that neuropathy is improving. Waffle overlay is in place to maintain skin integrity. Bed is low and locked, bed alarm is on, call light is within reach, hourly rounding continues.     Problem: Optimal Care of the Stroke Patient  Goal: Optimal acute care for the stroke patient  Outcome: Progressing     Problem: Knowledge Deficit - Stroke Education  Goal: Patient's knowledge of stroke and risk factors will improve  Outcome: Progressing    Patient is not progressing towards the following goals:    Problem: Urinary Elimination  Goal: Establish and maintain regular urinary output  Outcome: Not Progressing  Note: Patient requires a purewick for urinary frequency.      Problem: Mobility - Stroke  Goal: Patient's capacity to carry out activities will improve  Outcome: Not Progressing  Note: Patient refuses to ambulate, and states that L sided neuropathy makes it worse.

## 2021-09-22 NOTE — PROGRESS NOTES
MS: rhythm: SR/SB/first degree HB, HR 50's-80's, RI 0.22/QRS0.10/QT0.42 with PVC and coup per strip from monitor room

## 2021-09-22 NOTE — DISCHARGE PLANNING
Agency/Facility Name: [facility]  Spoke To: [facility]  Outcome: Has a bed available today.  [facility] does not have transport available.    KHUSHI Vazquez notified via Teams.

## 2021-09-22 NOTE — DISCHARGE INSTRUCTIONS
"Discharge Instructions    Discharged to other by ambulance with escort. Discharged via ambulance, hospital escort: Yes.  Special equipment needed: N/A    Be sure to schedule a follow-up appointment with your primary care doctor or any specialists as instructed.     Discharge Plan:   Influenza Vaccine Indication: Indicated: 65 years and older    I understand that a diet low in cholesterol, fat, and sodium is recommended for good health. Unless I have been given specific instructions below for another diet, I accept this instruction as my diet prescription.   Other diet: Regular     Special Instructions:     Stroke/CVA/TIA/Hemorrhagic Ischemia Discharge Instructions  You have had a stroke. Your risk factors have been identified as follows:  Age - Over 55  High blood pressure  Diabetes  Previous TIAs or \"mini strokes\"  High Cholesterol and lipids  Overweight  It is important that you reduce your risk factors to avoid another stroke in the future. Here are some general guidelines to follow:  · Eat healthy - avoid food high in fat.  · Get regular exercise.  · Maintain a healthy weight.  · Avoid smoking.  · Avoid alcohol and illegal drug use.  · Take your medications as directed.  For more information regarding risk factors, refer to pages 17-19 in your Stroke Patient Education Guide. Stroke Education Guide was given to patient.    Warning signs of a stroke include (which can also be found on page 3 of your Stroke Patient Education Guide):  · Sudden numbness of weakness of the face, arm or leg (especially on one side of the body).  · Sudden confusion, trouble speaking or understanding.  · Sudden trouble seeing in one or both eyes.  · Sudden trouble walking, dizziness, loss of balance or coordination.  · Sudden severe headache with no known cause.  It is very important to get treatment quickly when a stroke occurs. If you experience any of the above warning signs, call 911 immediately.     Some patients who have had a " stroke will be going home on a blood thinner medication called Warfarin (Coumadin).  This medication requires very close monitoring and follow up.  This follow up can be provided by either your Primary Care Physician or by [facility]'s Outpatient Anticoagulation Service.  The Outpatient Anticoagulation Service is located at the [facility] at [facility] ([facility]).  If you do not know when your follow up appointment is scheduled, call [phone] to verify your appointment time.      · Is patient discharged on Warfarin / Coumadin?   No     Depression / Suicide Risk    As you are discharged from this [facility], it is important to learn how to keep safe from harming yourself.    Recognize the warning signs:  · Abrupt changes in personality, positive or negative- including increase in energy   · Giving away possessions  · Change in eating patterns- significant weight changes-  positive or negative  · Change in sleeping patterns- unable to sleep or sleeping all the time   · Unwillingness or inability to communicate  · Depression  · Unusual sadness, discouragement and loneliness  · Talk of wanting to die  · Neglect of personal appearance   · Rebelliousness- reckless behavior  · Withdrawal from people/activities they love  · Confusion- inability to concentrate     If you or a loved one observes any of these behaviors or has concerns about self-harm, here's what you can do:  · Talk about it- your feelings and reasons for harming yourself  · Remove any means that you might use to hurt yourself (examples: pills, rope, extension cords, firearm)  · Get professional help from the community (Mental Health, Substance Abuse, psychological counseling)  · Do not be alone:Call your Safe Contact- someone whom you trust who will be there for you.  · Call your local CRISIS HOTLINE 251-1371 or 572-817-2720  · Call your local Children's Mobile Crisis Response Team [place] (538) 303-7379 or [employer]  · Call the toll free National Suicide Prevention Hotlines   · National Suicide Prevention Lifeline 257-391-MEHJ (4636)  · National Hope Line Network 800-SUICIDE (191-2700)      Stroke Prevention  Some medical conditions and behaviors are associated with a higher chance of having a stroke. You can help prevent a stroke by making nutrition, lifestyle, and other changes, including managing any medical conditions you may have.  What nutrition changes can be made?    · Eat healthy foods. You can do this by:  ? Choosing foods high in fiber, such as fresh fruits and vegetables and whole grains.  ? Eating at least 5 or more servings of fruits and vegetables a day. Try to fill half of your plate at each meal with fruits and vegetables.  ? Choosing lean protein foods, such as lean cuts of meat, poultry without skin, fish, tofu, beans, and nuts.  ? Eating low-fat dairy products.  ? Avoiding foods that are high in salt (sodium). This can help lower blood pressure.  ? Avoiding foods that have saturated fat, trans fat, and cholesterol. This can help prevent high cholesterol.  ? Avoiding processed and premade foods.  · Follow your health care provider's specific guidelines for losing weight, controlling high blood pressure (hypertension), lowering high cholesterol, and managing diabetes. These may include:  ? Reducing your daily calorie intake.  ? Limiting your daily sodium intake to 1,500 milligrams (mg).  ? Using only healthy fats for cooking, such as olive oil, canola oil, or sunflower oil.  ? Counting your daily carbohydrate intake.  What lifestyle changes can be made?  · Maintain a healthy weight. Talk to your health care provider about your ideal weight.  · Get at least 30 minutes of moderate physical activity at least 5 days a week. Moderate activity includes brisk walking, biking, and swimming.  · Do not use any products that contain nicotine or tobacco, such as cigarettes and  e-cigarettes. If you need help quitting, ask your health care provider. It may also be helpful to avoid exposure to secondhand smoke.  · Limit alcohol intake to no more than 1 drink a day for nonpregnant women and 2 drinks a day for men. One drink equals 12 oz of beer, 5 oz of wine, or 1½ oz of hard liquor.  · Stop any illegal drug use.  · Avoid taking birth control pills. Talk to your health care provider about the risks of taking birth control pills if:  ? You are over 35 years old.  ? You smoke.  ? You get migraines.  ? You have ever had a blood clot.  What other changes can be made?  · Manage your cholesterol levels.  ? Eating a healthy diet is important for preventing high cholesterol. If cholesterol cannot be managed through diet alone, you may also need to take medicines.  ? Take any prescribed medicines to control your cholesterol as told by your health care provider.  · Manage your diabetes.  ? Eating a healthy diet and exercising regularly are important parts of managing your blood sugar. If your blood sugar cannot be managed through diet and exercise, you may need to take medicines.  ? Take any prescribed medicines to control your diabetes as told by your health care provider.  · Control your hypertension.  ? To reduce your risk of stroke, try to keep your blood pressure below 130/80.  ? Eating a healthy diet and exercising regularly are an important part of controlling your blood pressure. If your blood pressure cannot be managed through diet and exercise, you may need to take medicines.  ? Take any prescribed medicines to control hypertension as told by your health care provider.  ? Ask your health care provider if you should monitor your blood pressure at home.  ? Have your blood pressure checked every year, even if your blood pressure is normal. Blood pressure increases with age and some medical conditions.  · Get evaluated for sleep disorders (sleep apnea). Talk to your health care provider about  getting a sleep evaluation if you snore a lot or have excessive sleepiness.  · Take over-the-counter and prescription medicines only as told by your health care provider. Aspirin or blood thinners (antiplatelets or anticoagulants) may be recommended to reduce your risk of forming blood clots that can lead to stroke.  · Make sure that any other medical conditions you have, such as atrial fibrillation or atherosclerosis, are managed.  What are the warning signs of a stroke?  The warning signs of a stroke can be easily remembered as BEFAST.  · B is for balance. Signs include:  ? Dizziness.  ? Loss of balance or coordination.  ? Sudden trouble walking.  · E is for eyes. Signs include:  ? A sudden change in vision.  ? Trouble seeing.  · F is for face. Signs include:  ? Sudden weakness or numbness of the face.  ? The face or eyelid drooping to one side.  · A is for arms. Signs include:  ? Sudden weakness or numbness of the arm, usually on one side of the body.  · S is for speech. Signs include:  ? Trouble speaking (aphasia).  ? Trouble understanding.  · T is for time.  ? These symptoms may represent a serious problem that is an emergency. Do not wait to see if the symptoms will go away. Get medical help right away. Call your local emergency services (911 in the [place] Do not drive yourself to the hospital.  · Other signs of stroke may include:  ? A sudden, severe headache with no known cause.  ? Nausea or vomiting.  ? Seizure.  Where to find more information  For more information, visit:  · American Stroke Association: www.strokeassociation.org  · National Stroke Association: www.stroke.org  Summary  · You can prevent a stroke by eating healthy, exercising, not smoking, limiting alcohol intake, and managing any medical conditions you may have.  · Do not use any products that contain nicotine or tobacco, such as cigarettes and e-cigarettes. If you need help quitting, ask your health care provider. It may also be helpful to  avoid exposure to secondhand smoke.  · Remember BEFAST for warning signs of stroke. Get help right away if you or a loved one has any of these signs.  This information is not intended to replace advice given to you by your health care provider. Make sure you discuss any questions you have with your health care provider.  Document Released: 01/25/2006 Document Revised: 11/30/2018 Document Reviewed: 01/23/2018  Elsevier Patient Education © 2020 [employer]

## 2021-09-22 NOTE — PROGRESS NOTES
Monitor summary: SB/SR 58-74, IA 0.24, QRS 0.10, QT 0.43, with occasional PACs and rare PVCs per strip from monitor room.

## 2021-09-22 NOTE — PROGRESS NOTES
Pt discharged via [facility] to [place]. Attempted to call report. Message left with contact info for RN to return call. Zio patch placed. Pt educated on patch. Verbalized understanding. Belongings with pt.

## 2021-09-22 NOTE — PROGRESS NOTES
Hospital Medicine Daily Progress Note    Date of Service  9/22/2021    Chief Complaint  Ariella Sanchez is a 65 y.o. female admitted 9/19/2021 with left sided weakness    Hospital Course   65 y.o. female with a past medical history of diabetes, hypertension, previous CVA (with residual left-sided weakness) who presented 9/19/2021 with worsening left-sided weakness.     The patient states that she was at her usual state of health up until last night, when she started noticing left-sided weakness.  She mentions she did not pay too much attention and fell asleep.  This morning when she woke up she noticed that it was very hard for her to get out of bed, and noticed worsening left-sided weakness from her usual state.  Due to this the patient decided to come to the ER for further assessment and evaluation.  The patient denies chest pain, abdominal pain, headache, fever, sick contacts.     In the ER the patient had a CT scan of the head which did not report any acute intracranial findings.  There is a concern for new stroke.  Neurology has been consulted, we appreciate further recommendations.    Interval Problem Update  Echo showed EF 60%.  MRI brain showed new small areas of acute infarcts in the bilateral frontal and right occipital lobes.  A1c 7.  Patient reports her left-sided weakness has resolved, she is still feeling extremely tired.  Pending PT/OT, PMNR consulted.  Neurology has signed off recommending Zio patch at discharge recommending goal of normotensive -130/60-80, will start amlodipine, blood pressure still 180s.  9/21:  Ziopatch placed.  Feeling better.  Runs of vtach 10 seconds noted by monitor tech, changed norvasc to metoprolol for better rate control.    I have personally seen and examined the patient at bedside. I discussed the plan of care with patient, bedside RN, charge RN and .    Consultants/Specialty  neurology    Code Status  DNAR/DNI    Disposition  Patient is not medically  cleared.   Anticipate discharge to to skilled nursing facility vs rehab   I have placed the appropriate orders for post-discharge needs.    Review of Systems  Review of Systems   Constitutional: Positive for malaise/fatigue. Negative for chills and fever.   Respiratory: Negative for shortness of breath.    Cardiovascular: Negative for chest pain.   Gastrointestinal: Negative for abdominal pain, nausea and vomiting.   Musculoskeletal: Negative for myalgias.   Skin: Negative for rash.   Neurological: Positive for weakness. Negative for dizziness and headaches.   Psychiatric/Behavioral: Negative for depression.   All other systems reviewed and are negative.       Physical Exam  Temp:  [36.1 °C (96.9 °F)-36.6 °C (97.8 °F)] 36.1 °C (96.9 °F)  Pulse:  [60-83] 64  Resp:  [17-19] 18  BP: (138-177)/() 159/72  SpO2:  [93 %-99 %] 93 %    Physical Exam  Vitals and nursing note reviewed.   Constitutional:       General: She is not in acute distress.  HENT:      Head: Normocephalic.   Eyes:      Extraocular Movements: Extraocular movements intact.      Conjunctiva/sclera: Conjunctivae normal.   Cardiovascular:      Rate and Rhythm: Normal rate and regular rhythm.      Pulses: Normal pulses.      Heart sounds: Normal heart sounds.   Pulmonary:      Effort: Pulmonary effort is normal. No respiratory distress.      Breath sounds: Normal breath sounds.   Abdominal:      General: Abdomen is flat. There is no distension.      Palpations: Abdomen is soft.      Tenderness: There is no abdominal tenderness.   Musculoskeletal:         General: Normal range of motion.      Cervical back: Normal range of motion and neck supple.      Right lower leg: No edema.      Left lower leg: No edema.   Skin:     General: Skin is warm and dry.   Neurological:      General: No focal deficit present.      Mental Status: She is alert and oriented to person, place, and time.      Cranial Nerves: No cranial nerve deficit.      Motor: Weakness (b/l LE,  chronic per pt ) present.   Psychiatric:         Mood and Affect: Mood normal.         Behavior: Behavior normal.         Fluids    Intake/Output Summary (Last 24 hours) at 9/22/2021 0826  Last data filed at 9/22/2021 0557  Gross per 24 hour   Intake 720 ml   Output 1300 ml   Net -580 ml       Laboratory  Recent Labs     09/19/21  0939   WBC 6.9   RBC 4.91   HEMOGLOBIN 14.7   HEMATOCRIT 44.6   MCV 90.8   MCH 29.9   MCHC 33.0*   RDW 40.7   PLATELETCT 260   MPV 11.1     Recent Labs     09/19/21  0939   SODIUM 135   POTASSIUM 4.5   CHLORIDE 100   CO2 24   GLUCOSE 173*   BUN 12   CREATININE 0.74   CALCIUM 9.1     Recent Labs     09/19/21  0939   APTT 29.6   INR 1.20*         Recent Labs     09/20/21  0228   TRIGLYCERIDE 93   HDL 51   *       Imaging  MR-BRAIN-W/O   Final Result      1.  A few small areas of acute infarcts in the bilateral frontal and right occipital lobes.   2.  Encephalomalacia in the right basal ganglia with chronic hemorrhagic products.   3.  Multifocal chronic infarcts.   4.  Moderate chronic microvascular ischemic disease.   5.  Moderate cerebral volume loss.      EC-ECHOCARDIOGRAM COMPLETE W/ CONT   Final Result      CT-CTA NECK WITH & W/O-POST PROCESSING   Final Result      1.  Atherosclerosis without high-grade stenosis of the neck arteries.   2.  4.4 x 3.2 cm left thyroid mass could be further evaluated with outpatient ultrasound.      CT-CEREBRAL PERFUSION ANALYSIS   Final Result      1.  Cerebral blood flow less than 30% likely representing completed infarct = 0 mL.      2.  T Max more than 6 seconds likely representing combination of completed infarct and ischemia = 4 mL.      3.  Mismatched volume likely representing ischemic brain/penumbra = 4      4.  Please note that the cerebral perfusion was performed on the limited brain tissue around the basal ganglia region. Infarct/ischemia outside the CT perfusion sections can be missed in this study.      CT-CTA HEAD WITH & W/O-POST  PROCESS   Final Result      No large vessel occlusion or aneurysm.      CT-HEAD W/O   Final Result         1. No acute intracranial findings. No evidence of acute intracranial hemorrhage or mass lesion.      2. White matter lucencies most consistent with chronic small vessel ischemic change.               DX-CHEST-PORTABLE (1 VIEW)   Final Result         1. No acute cardiopulmonary abnormalities are identified.           Assessment/Plan  * Left-sided weakness- (present on admission)  Assessment & Plan  Patient has left-sided residual weakness from previous strokes.  However the patient states that she has worsening left-sided weakness.  There is a concern for new stroke, the patient is not a candidate for TPA at this time.  MRI showed acute infarcts of the bilateral frontal and right occipital lobes  Patient currently on aspirin and atorvastatin.  Neurology has been consulted, Zio patch on discharge, f/u stroke bridge clinic     Advance care planning- (present on admission)  Assessment & Plan  We discussed for at least 10 minutes at length the different options for CODE STATUS.  The patient is adamant that she would like to be DNR/DNI for now.    Non-compliant patient- (present on admission)  Assessment & Plan  Patient has multiple comorbidities including diabetes, hypertension, previous CVA.  The patient mentions that she has not been taking any medication for over a year.  The patient will require close follow-up with PCP as an outpatient.    CVA (cerebral vascular accident) (HCC)- (present on admission)  Assessment & Plan  Patient has a history of CVA.  Patient states that he has not been taking any medication for over a year.  We have started patient on atorvastatin and aspirin.    The patient comes with worsening left-sided weakness, there is a concern for new stroke.  MRI showed acute CVA  Neurology following, zio patch, statin, asa, f/u outpatient     Dyslipidemia- (present on admission)  Assessment &  Plan  It appears patient has a history of dyslipidemia.  Patient has not taken any medication for over a year.  Patient currently on atorvastatin.    HTN (hypertension)- (present on admission)  Assessment & Plan  Patient with a history of hypertension.  The patient has not been taking indication for over a year.  Patient with systolic blood pressure in the 190s on admission.  Due to concern for stroke, patient started on permissive hypertension.  Per neuro ok to restart BP meds with goal 110-130/60-80  Start amlodipine    DM (diabetes mellitus) (CMS-HCC)- (present on admission)  Assessment & Plan  We have started the patient on insulin sliding scale.  A1c on 9/19/2021 was 7.  The patient mentioned that she has not taken any medication for over a year.  Will need to start metformin on dc   The patient will require close follow-up as an outpatient.  Hypoglycemia protocol.  Monitor, make changes accordingly.    Obesity- (present on admission)  Assessment & Plan  Patient with a BMI of 46.99.  The patient has been counseled on lifestyle modifications  The patient will require close follow-up as an outpatient.       VTE prophylaxis: SCDs/TEDs and enoxaparin ppx    I have performed a physical exam and reviewed and updated ROS and Plan today (9/22/2021). In review of yesterday's note (9/21/2021), there are no changes except as documented above.

## 2021-10-21 ENCOUNTER — TELEPHONE (OUTPATIENT)
Dept: CARDIOLOGY | Facility: MEDICAL CENTER | Age: 66
End: 2021-10-21

## 2021-10-23 ENCOUNTER — APPOINTMENT (OUTPATIENT)
Dept: RADIOLOGY | Facility: MEDICAL CENTER | Age: 66
End: 2021-10-23
Attending: EMERGENCY MEDICINE
Payer: MEDICARE

## 2021-10-23 ENCOUNTER — HOSPITAL ENCOUNTER (EMERGENCY)
Facility: MEDICAL CENTER | Age: 66
End: 2021-10-25
Attending: EMERGENCY MEDICINE
Payer: MEDICARE

## 2021-10-23 DIAGNOSIS — R53.1 GENERALIZED WEAKNESS: ICD-10-CM

## 2021-10-23 DIAGNOSIS — N39.0 ACUTE UTI: ICD-10-CM

## 2021-10-23 LAB
ALBUMIN SERPL BCP-MCNC: 3.6 G/DL (ref 3.2–4.9)
ALBUMIN/GLOB SERPL: 1.1 G/DL
ALP SERPL-CCNC: 89 U/L (ref 30–99)
ALT SERPL-CCNC: 17 U/L (ref 2–50)
ANION GAP SERPL CALC-SCNC: 10 MMOL/L (ref 7–16)
APPEARANCE UR: ABNORMAL
AST SERPL-CCNC: 20 U/L (ref 12–45)
BACTERIA #/AREA URNS HPF: ABNORMAL /HPF
BASOPHILS # BLD AUTO: 0.3 % (ref 0–1.8)
BASOPHILS # BLD: 0.03 K/UL (ref 0–0.12)
BILIRUB SERPL-MCNC: 0.8 MG/DL (ref 0.1–1.5)
BILIRUB UR QL STRIP.AUTO: NEGATIVE
BUN SERPL-MCNC: 20 MG/DL (ref 8–22)
CALCIUM SERPL-MCNC: 8.8 MG/DL (ref 8.5–10.5)
CHLORIDE SERPL-SCNC: 107 MMOL/L (ref 96–112)
CO2 SERPL-SCNC: 22 MMOL/L (ref 20–33)
COLOR UR: ABNORMAL
CREAT SERPL-MCNC: 0.74 MG/DL (ref 0.5–1.4)
EKG IMPRESSION: NORMAL
EOSINOPHIL # BLD AUTO: 0.13 K/UL (ref 0–0.51)
EOSINOPHIL NFR BLD: 1.4 % (ref 0–6.9)
EPI CELLS #/AREA URNS HPF: ABNORMAL /HPF
ERYTHROCYTE [DISTWIDTH] IN BLOOD BY AUTOMATED COUNT: 42.7 FL (ref 35.9–50)
GLOBULIN SER CALC-MCNC: 3.3 G/DL (ref 1.9–3.5)
GLUCOSE SERPL-MCNC: 122 MG/DL (ref 65–99)
GLUCOSE UR STRIP.AUTO-MCNC: NEGATIVE MG/DL
HCT VFR BLD AUTO: 40.1 % (ref 37–47)
HGB BLD-MCNC: 12.8 G/DL (ref 12–16)
HYALINE CASTS #/AREA URNS LPF: ABNORMAL /LPF
IMM GRANULOCYTES # BLD AUTO: 0.02 K/UL (ref 0–0.11)
IMM GRANULOCYTES NFR BLD AUTO: 0.2 % (ref 0–0.9)
KETONES UR STRIP.AUTO-MCNC: NEGATIVE MG/DL
LEUKOCYTE ESTERASE UR QL STRIP.AUTO: ABNORMAL
LYMPHOCYTES # BLD AUTO: 2.41 K/UL (ref 1–4.8)
LYMPHOCYTES NFR BLD: 25.7 % (ref 22–41)
MCH RBC QN AUTO: 29.6 PG (ref 27–33)
MCHC RBC AUTO-ENTMCNC: 31.9 G/DL (ref 33.6–35)
MCV RBC AUTO: 92.6 FL (ref 81.4–97.8)
MICRO URNS: ABNORMAL
MONOCYTES # BLD AUTO: 0.68 K/UL (ref 0–0.85)
MONOCYTES NFR BLD AUTO: 7.2 % (ref 0–13.4)
NEUTROPHILS # BLD AUTO: 6.12 K/UL (ref 2–7.15)
NEUTROPHILS NFR BLD: 65.2 % (ref 44–72)
NITRITE UR QL STRIP.AUTO: POSITIVE
NRBC # BLD AUTO: 0 K/UL
NRBC BLD-RTO: 0 /100 WBC
PH UR STRIP.AUTO: 5 [PH] (ref 5–8)
PLATELET # BLD AUTO: 245 K/UL (ref 164–446)
PMV BLD AUTO: 11.7 FL (ref 9–12.9)
POTASSIUM SERPL-SCNC: 4.2 MMOL/L (ref 3.6–5.5)
PROT SERPL-MCNC: 6.9 G/DL (ref 6–8.2)
PROT UR QL STRIP: NEGATIVE MG/DL
RBC # BLD AUTO: 4.33 M/UL (ref 4.2–5.4)
RBC # URNS HPF: ABNORMAL /HPF
RBC UR QL AUTO: NEGATIVE
SODIUM SERPL-SCNC: 139 MMOL/L (ref 135–145)
SP GR UR STRIP.AUTO: 1.02
TROPONIN T SERPL-MCNC: 14 NG/L (ref 6–19)
UROBILINOGEN UR STRIP.AUTO-MCNC: 1 MG/DL
WBC # BLD AUTO: 9.4 K/UL (ref 4.8–10.8)
WBC #/AREA URNS HPF: ABNORMAL /HPF

## 2021-10-23 PROCEDURE — 87086 URINE CULTURE/COLONY COUNT: CPT

## 2021-10-23 PROCEDURE — 87186 SC STD MICRODIL/AGAR DIL: CPT

## 2021-10-23 PROCEDURE — 93005 ELECTROCARDIOGRAM TRACING: CPT | Performed by: EMERGENCY MEDICINE

## 2021-10-23 PROCEDURE — 81001 URINALYSIS AUTO W/SCOPE: CPT

## 2021-10-23 PROCEDURE — 85025 COMPLETE CBC W/AUTO DIFF WBC: CPT

## 2021-10-23 PROCEDURE — 87077 CULTURE AEROBIC IDENTIFY: CPT

## 2021-10-23 PROCEDURE — 71045 X-RAY EXAM CHEST 1 VIEW: CPT

## 2021-10-23 PROCEDURE — 99285 EMERGENCY DEPT VISIT HI MDM: CPT

## 2021-10-23 PROCEDURE — 84484 ASSAY OF TROPONIN QUANT: CPT

## 2021-10-23 PROCEDURE — 80053 COMPREHEN METABOLIC PANEL: CPT

## 2021-10-23 PROCEDURE — 96374 THER/PROPH/DIAG INJ IV PUSH: CPT

## 2021-10-23 PROCEDURE — 700111 HCHG RX REV CODE 636 W/ 250 OVERRIDE (IP): Performed by: EMERGENCY MEDICINE

## 2021-10-23 RX ORDER — CEFTRIAXONE 2 G/1
2 INJECTION, POWDER, FOR SOLUTION INTRAMUSCULAR; INTRAVENOUS ONCE
Status: COMPLETED | OUTPATIENT
Start: 2021-10-23 | End: 2021-10-23

## 2021-10-23 RX ADMIN — CEFTRIAXONE SODIUM 2 G: 2 INJECTION, POWDER, FOR SOLUTION INTRAMUSCULAR; INTRAVENOUS at 19:50

## 2021-10-23 ASSESSMENT — FIBROSIS 4 INDEX: FIB4 SCORE: 1.28

## 2021-10-23 NOTE — DISCHARGE PLANNING
Met with patient, states she was discharged from [facility] last Monday and states has been weak at home, unable to care for self.  She would be agreeable to return. PT and OT evals pending at this time.

## 2021-10-23 NOTE — ED TRIAGE NOTES
Pt comes in complaining of inability to care for herself. Per KRISTAL pt was recently admitted to [facility] from here and left AMA because she didn't like it. Pt has been home trying to care for herself and is unable to care for herself.

## 2021-10-23 NOTE — ED PROVIDER NOTES
"ED Provider Note  CHIEF COMPLAINT  Chief Complaint   Patient presents with   • Weakness       HPI  Ariella Sanchez is a 66 y.o. female who presents to the ER with complaints of generalized weakness and inability to care for self at home.  The patient \"pushed for discharge\" at [facility] 6 days ago.  She was there rehabbing from a stroke.  She has some residual left-sided weakness.  She said she left because \"I thought I knew more than everybody else.\"  She said since getting home she has found that she is unable to perform her activities of daily living.  She uses a walker but has been having a hard time getting around.  She has not been eating or drinking due to lack of appetite.  No change in the smell or color of the urine.  No dysuria.  She feels generally more weak.  She thinks the peripheral neuropathy is improved somewhat due to the gabapentin, but she still has her residual left-sided weakness from her stroke.  No chest pains or shortness of breath.  No cough.  She is vaccinated against COVID.  No fevers or chills.  No abdominal pain.  No vomiting or diarrhea.  She says she would like to be readmitted to rehab.    REVIEW OF SYSTEMS  See HPI for further details.  Positive for generalized weakness, inability to care for self, chronic left-sided weakness, chronic neuropathy, decreased appetite.  Negative for fever, chills, chest pain, shortness of breath, cough, abdominal pain, vomiting, diarrhea. All other systems are negative.    PAST MEDICAL HISTORY  Past Medical History:   Diagnosis Date   • Anxiety    • Depression    • Diabetes    • Hypertension    • Leg pain    • Neuropathy, peripheral    • Stroke (HCC) 2011       FAMILY HISTORY  Patient denies family history of cancer, heart disease, or any other significant family history.    SOCIAL HISTORY  Social History     Socioeconomic History   • Marital status: Single     Spouse name: Not on file   • Number of children: Not on file   • Years of education: Not on file " "  • Highest education level: Not on file   Occupational History   • Not on file   Tobacco Use   • Smoking status: Never Smoker   • Smokeless tobacco: Never Used   Substance and Sexual Activity   • Alcohol use: No   • Drug use: Yes     Types: Marijuana   • Sexual activity: Not on file   Other Topics Concern   • Not on file   Social History Narrative   • Not on file     Social Determinants of Health     Financial Resource Strain:    • Difficulty of Paying Living Expenses:    Food Insecurity:    • Worried About Running Out of Food in the Last Year:    • Ran Out of Food in the Last Year:    Transportation Needs:    • Lack of Transportation (Medical):    • Lack of Transportation (Non-Medical):    Physical Activity:    • Days of Exercise per Week:    • Minutes of Exercise per Session:    Stress:    • Feeling of Stress :    Social Connections:    • Frequency of Communication with Friends and Family:    • Frequency of Social Gatherings with Friends and Family:    • Attends [employer] Services:    • Active Member of Clubs or Organizations:    • Attends Club or Organization Meetings:    • Marital Status:    Intimate Partner Violence:    • Fear of Current or Ex-Partner:    • Emotionally Abused:    • Physically Abused:    • Sexually Abused:        SURGICAL HISTORY  Past Surgical History:   Procedure Laterality Date   • FEMUR ORIF Left 12/19/2016    Procedure: FEMUR ORIF - Distal ;  Surgeon: Mango Elizondo M.D.;  Location: SURGERY [facility];  Service:    • TONSILLECTOMY     • TUBAL LIGATION         CURRENT MEDICATIONS  Home Medications    **Home medications have not yet been reviewed for this encounter**         ALLERGIES  Allergies   Allergen Reactions   • Metformin Nausea     GI upset. No hypersensitivity symptoms.   RXN=unknown       PHYSICAL EXAM  VITAL SIGNS: /86   Pulse 66   Temp 36.9 °C (98.4 °F) (Oral)   Resp (!) 24   Ht 1.702 m (5' 7\")   Wt (!) 148 kg (326 lb)   SpO2 96%   BMI 51.06 kg/m²  "     Constitutional: Well developed, well nourished; No acute distress; Non-toxic appearance.  Elevated BMI  HENT: Normocephalic, atraumatic; Bilateral external ears normal; dry mucous membranes  Eyes: PERRL, EOMI, Conjunctiva normal. No discharge.   Neck:  Supple, nontender midline; No stridor; No nuchal rigidity.   Lymphatic: No cervical lymphadenopathy noted.   Cardiovascular: Regular rate and rhythm without murmurs, rubs, or gallop.   Thorax & Lungs: No respiratory distress, breath sounds clear to auscultation bilaterally with scattered coarse wheezing throughout without rales or rhonchi. Nontender chest wall. No crepitus or subcutaneous air  Abdomen: Soft, nontender, bowel sounds normal. No obvious masses; No pulsatile masses; no rebound, guarding, or peritoneal signs.   Skin: Good color; warm and dry without rash or petechia.  Back: Nontender, No CVA tenderness.   Extremities: Distal radial, dorsalis pedis, posterior tibial pulses are equal bilaterally; No edema; Nontender calves or saphenous, No cyanosis, No clubbing.   Musculoskeletal: Good range of motion in all major joints. No tenderness to palpation or major deformities noted.   Neurologic: Alert & oriented x 4, clear speech    EKG  Please see my EKG interpretation below    RADIOLOGY/PROCEDURES  DX-CHEST-PORTABLE (1 VIEW)   Final Result         1. No acute cardiopulmonary abnormalities are identified.          COURSE & MEDICAL DECISION MAKING  Pertinent Labs & Imaging studies reviewed. (See chart for details)    Results for orders placed or performed during the hospital encounter of 10/23/21   CBC WITH DIFFERENTIAL   Result Value Ref Range    WBC 9.4 4.8 - 10.8 K/uL    RBC 4.33 4.20 - 5.40 M/uL    Hemoglobin 12.8 12.0 - 16.0 g/dL    Hematocrit 40.1 37.0 - 47.0 %    MCV 92.6 81.4 - 97.8 fL    MCH 29.6 27.0 - 33.0 pg    MCHC 31.9 (L) 33.6 - 35.0 g/dL    RDW 42.7 35.9 - 50.0 fL    Platelet Count 245 164 - 446 K/uL    MPV 11.7 9.0 - 12.9 fL     Neutrophils-Polys 65.20 44.00 - 72.00 %    Lymphocytes 25.70 22.00 - 41.00 %    Monocytes 7.20 0.00 - 13.40 %    Eosinophils 1.40 0.00 - 6.90 %    Basophils 0.30 0.00 - 1.80 %    Immature Granulocytes 0.20 0.00 - 0.90 %    Nucleated RBC 0.00 /100 WBC    Neutrophils (Absolute) 6.12 2.00 - 7.15 K/uL    Lymphs (Absolute) 2.41 1.00 - 4.80 K/uL    Monos (Absolute) 0.68 0.00 - 0.85 K/uL    Eos (Absolute) 0.13 0.00 - 0.51 K/uL    Baso (Absolute) 0.03 0.00 - 0.12 K/uL    Immature Granulocytes (abs) 0.02 0.00 - 0.11 K/uL    NRBC (Absolute) 0.00 K/uL   COMP METABOLIC PANEL   Result Value Ref Range    Sodium 139 135 - 145 mmol/L    Potassium 4.2 3.6 - 5.5 mmol/L    Chloride 107 96 - 112 mmol/L    Co2 22 20 - 33 mmol/L    Anion Gap 10.0 7.0 - 16.0    Glucose 122 (H) 65 - 99 mg/dL    Bun 20 8 - 22 mg/dL    Creatinine 0.74 0.50 - 1.40 mg/dL    Calcium 8.8 8.5 - 10.5 mg/dL    AST(SGOT) 20 12 - 45 U/L    ALT(SGPT) 17 2 - 50 U/L    Alkaline Phosphatase 89 30 - 99 U/L    Total Bilirubin 0.8 0.1 - 1.5 mg/dL    Albumin 3.6 3.2 - 4.9 g/dL    Total Protein 6.9 6.0 - 8.2 g/dL    Globulin 3.3 1.9 - 3.5 g/dL    A-G Ratio 1.1 g/dL   TROPONIN   Result Value Ref Range    Troponin T 14 6 - 19 ng/L   URINALYSIS (UA)    Specimen: Urine, Clean Catch   Result Value Ref Range    Color DK Yellow     Character Cloudy (A)     Specific Gravity 1.024 <1.035    Ph 5.0 5.0 - 8.0    Glucose Negative Negative mg/dL    Ketones Negative Negative mg/dL    Protein Negative Negative mg/dL    Bilirubin Negative Negative    Urobilinogen, Urine 1.0 Negative    Nitrite Positive (A) Negative    Leukocyte Esterase Small (A) Negative    Occult Blood Negative Negative    Micro Urine Req Microscopic    ESTIMATED GFR   Result Value Ref Range    GFR If African American >60 >60 mL/min/1.73 m 2    GFR If Non African American >60 >60 mL/min/1.73 m 2   URINE MICROSCOPIC (W/UA)   Result Value Ref Range    WBC 20-50 (A) /hpf    RBC 2-5 (A) /hpf    Bacteria Many (A) None /hpf     Epithelial Cells Few /hpf    Hyaline Cast 0-2 /lpf   EKG (NOW)   Result Value Ref Range    Report       [facility] Emergency Dept.    Test Date:  2021-10-23  Pt Name:    GERA BAJWA                Department: ER  MRN:        [id number]                      Room:       [facility]  Gender:     Female                       Technician: [id number]  :        [id number]                   Requested By:DURGA WEN  Order #:    [id number]                    Reading MD: Durga Wen    Measurements  Intervals                                Axis  Rate:       60                           P:          24  KS:         228                          QRS:        -49  QRSD:       108                          T:          61  QT:         444  QTc:        444    Interpretive Statements  SINUS RHYTHM rate of 60  Poor R wave progression  No ST elevation or depression  T wave flattening aVL and lead III  FIRST DEGREE AV BLOCK  LEFT ANTERIOR FASCICULAR BLOCK  CONSIDER ANTERIOR INFARCT  Compared to ECG 2021 09:37:14  First degree AV block now present  Left anterior fascicular block now present  Ectop ic atrial rhythm no longer present  Myocardial infarct finding still present  Electronically Signed On 10- 16:18:24 PDT by Durga Wen         Patient presents to the ER with complaint of generalized weakness and inability to care for self after pushing for early discharge from [facility] rehab 5 days ago where she was being rehabilitated status post stroke.  Patient said that she thought she was okay to go home but did not realize how bad off she really was until she got home.  She says she is unable to performed activities of daily living.  She is generally weak.  She is not eating.  She has no appetite.  She says she just cannot do it at home and she needs to go back to rehab.  I spoke with the .   has requested a PT/OT evaluation.  Unfortunately that cannot be done until the morning.  Patient was  placed in ED observation status pending PT OT evaluation.  However, case management is hopeful that once PT/OT evaluation is done, patient will be able to get back to [facility] rehab.  Patient was found to have a urinary tract infection here in the ER today.  She was treated with Rocephin.  She will likely need to be discharged to rehab with prescription for outpatient antibiotics.  Urine culture has been ordered and is pending.  Unfortunately UTI does not qualify patient for an acute admission.  Other than feeling generally weak and being unable to care for self, the patient has really no other complaints and certainly no medical reason for overnight acute care hospitalization here at [facility].  This time her vitals are stable.  She will be here overnight awaiting PT/OT evaluation and hope of transfer back to rehab tomorrow.    I contacted the  shortly after leaving the patient's room.   did not answer the phone but I left a voicemail.  The  spoke with the patient's nurse and asked that I place an order for PT/OT.  Order was placed in the computer.    1915: I discussed the patient's case with Federico .  He has been in communication with the [facility]  who told him that the PT/OT evaluation will be done in the morning.  The patient will have to stay in the ER over the night to get PT/OT eval.  However, once PT/OT eval is done, the daytime  (who will be coming back tomorrow morning) we will try to get the patient back to [facility].  Federico says that a urinary tract infection does not qualify patient for inpatient admission.      Patient admitted to ED observation status at 1919 on October 23, 2021 for PT/OT evaluation for placement/transfer back to [facility].  Family history as noted above.      I verified that the patient was wearing a mask and I was wearing appropriate PPE every time I entered the room. The patient's mask was on the patient at all  times during my encounter except for a brief view of the oropharynx.    FINAL IMPRESSION  1. Acute UTI Acute    2. Generalized weakness Acute         This dictation has been created using voice recognition software. The accuracy of the dictation is limited by the abilities of the software. I expect there may be some errors of grammar and possibly content. I made every attempt to manually correct the errors within my dictation. However, errors related to voice recognition software may still exist and should be interpreted within the appropriate context.  Electronically signed by: Aleyda Wen M.D., 10/23/2021 2:04 PM

## 2021-10-24 LAB
GLUCOSE BLD-MCNC: 134 MG/DL (ref 65–99)
GLUCOSE BLD-MCNC: 70 MG/DL (ref 65–99)

## 2021-10-24 PROCEDURE — A9270 NON-COVERED ITEM OR SERVICE: HCPCS | Performed by: EMERGENCY MEDICINE

## 2021-10-24 PROCEDURE — 82962 GLUCOSE BLOOD TEST: CPT

## 2021-10-24 PROCEDURE — 700102 HCHG RX REV CODE 250 W/ 637 OVERRIDE(OP): Performed by: EMERGENCY MEDICINE

## 2021-10-24 PROCEDURE — 97162 PT EVAL MOD COMPLEX 30 MIN: CPT

## 2021-10-24 RX ORDER — LISINOPRIL 10 MG/1
5 TABLET ORAL DAILY
Status: DISCONTINUED | OUTPATIENT
Start: 2021-10-24 | End: 2021-10-25 | Stop reason: HOSPADM

## 2021-10-24 RX ORDER — METOPROLOL TARTRATE 50 MG/1
50 TABLET, FILM COATED ORAL 2 TIMES DAILY
Status: DISCONTINUED | OUTPATIENT
Start: 2021-10-24 | End: 2021-10-25 | Stop reason: HOSPADM

## 2021-10-24 RX ORDER — CEFDINIR 300 MG/1
300 CAPSULE ORAL 2 TIMES DAILY
Qty: 20 CAPSULE | Refills: 0 | Status: SHIPPED | OUTPATIENT
Start: 2021-10-24 | End: 2021-11-01 | Stop reason: SDUPTHER

## 2021-10-24 RX ORDER — ACETAMINOPHEN 325 MG/1
650 TABLET ORAL EVERY 6 HOURS PRN
Status: DISCONTINUED | OUTPATIENT
Start: 2021-10-24 | End: 2021-10-25 | Stop reason: HOSPADM

## 2021-10-24 RX ORDER — DEXTROSE MONOHYDRATE 25 G/50ML
50 INJECTION, SOLUTION INTRAVENOUS
Status: DISCONTINUED | OUTPATIENT
Start: 2021-10-24 | End: 2021-10-25 | Stop reason: HOSPADM

## 2021-10-24 RX ORDER — GABAPENTIN 300 MG/1
300 CAPSULE ORAL ONCE
Status: COMPLETED | OUTPATIENT
Start: 2021-10-24 | End: 2021-10-24

## 2021-10-24 RX ORDER — GABAPENTIN 300 MG/1
300 CAPSULE ORAL 2 TIMES DAILY
Status: DISCONTINUED | OUTPATIENT
Start: 2021-10-24 | End: 2021-10-25 | Stop reason: HOSPADM

## 2021-10-24 RX ORDER — ATORVASTATIN CALCIUM 20 MG/1
80 TABLET, FILM COATED ORAL EVERY EVENING
Status: DISCONTINUED | OUTPATIENT
Start: 2021-10-24 | End: 2021-10-25 | Stop reason: HOSPADM

## 2021-10-24 RX ORDER — GLYBURIDE 5 MG/1
5 TABLET ORAL
Status: DISCONTINUED | OUTPATIENT
Start: 2021-10-24 | End: 2021-10-25 | Stop reason: HOSPADM

## 2021-10-24 RX ORDER — ASPIRIN 81 MG/1
81 TABLET, CHEWABLE ORAL DAILY
Status: DISCONTINUED | OUTPATIENT
Start: 2021-10-24 | End: 2021-10-25 | Stop reason: HOSPADM

## 2021-10-24 RX ORDER — CEFDINIR 300 MG/1
300 CAPSULE ORAL EVERY 12 HOURS
Status: DISCONTINUED | OUTPATIENT
Start: 2021-10-24 | End: 2021-10-25 | Stop reason: HOSPADM

## 2021-10-24 RX ADMIN — METOPROLOL TARTRATE 50 MG: 50 TABLET, FILM COATED ORAL at 08:23

## 2021-10-24 RX ADMIN — GABAPENTIN 300 MG: 300 CAPSULE ORAL at 04:36

## 2021-10-24 RX ADMIN — METOPROLOL TARTRATE 50 MG: 50 TABLET, FILM COATED ORAL at 19:37

## 2021-10-24 RX ADMIN — ATORVASTATIN CALCIUM 80 MG: 20 TABLET, FILM COATED ORAL at 19:38

## 2021-10-24 RX ADMIN — LISINOPRIL 5 MG: 10 TABLET ORAL at 08:24

## 2021-10-24 RX ADMIN — GLYBURIDE 5 MG: 5 TABLET ORAL at 11:25

## 2021-10-24 RX ADMIN — ASPIRIN 81 MG: 81 TABLET, CHEWABLE ORAL at 08:23

## 2021-10-24 RX ADMIN — CEFDINIR 300 MG: 300 CAPSULE ORAL at 18:47

## 2021-10-24 RX ADMIN — GABAPENTIN 300 MG: 300 CAPSULE ORAL at 18:47

## 2021-10-24 RX ADMIN — ACETAMINOPHEN 650 MG: 325 TABLET ORAL at 23:54

## 2021-10-24 RX ADMIN — CEFDINIR 300 MG: 300 CAPSULE ORAL at 08:24

## 2021-10-24 ASSESSMENT — COGNITIVE AND FUNCTIONAL STATUS - GENERAL
SUGGESTED CMS G CODE MODIFIER MOBILITY: CL
MOVING TO AND FROM BED TO CHAIR: UNABLE
MOBILITY SCORE: 11
STANDING UP FROM CHAIR USING ARMS: A LITTLE
MOVING FROM LYING ON BACK TO SITTING ON SIDE OF FLAT BED: UNABLE
WALKING IN HOSPITAL ROOM: A LITTLE
CLIMB 3 TO 5 STEPS WITH RAILING: A LOT
TURNING FROM BACK TO SIDE WHILE IN FLAT BAD: UNABLE

## 2021-10-24 ASSESSMENT — GAIT ASSESSMENTS
DEVIATION: INCREASED BASE OF SUPPORT;DECREASED HEEL STRIKE;DECREASED TOE OFF
DISTANCE (FEET): 2
GAIT LEVEL OF ASSIST: MINIMAL ASSIST
ASSISTIVE DEVICE: FRONT WHEEL WALKER

## 2021-10-24 NOTE — THERAPY
Physical Therapy   Initial Evaluation     Patient Name: Ariella Sanchez  Age:  66 y.o., Sex:  female  Medical Record #: [id number]  Today's Date: 10/24/2021     Precautions: Fall Risk    Assessment  Patient is a 66 y.o. female admitted to ED with acute UTI and subjective reports of inability to care for self. Pt had previously left [facility] on Monday AMA and found that she was not able to take care of herself at home (details below). Hx of CVA with residual L sided weakness. PT eval completed. Pt at Min A for bed mobility tasks with slighlty incr time and effort. Min A for transfers with FWW from higher surface, and Mod A with FWW from lower surface. Pt able to amb briefly at Min A with FWW. Distance also limited d/t incontinent of bowel. Pt would benefit from continued therapy services to increase functional independence. Pt would also benefit from postacute placement to further address functional deficits and facilitate a safe transfer home; pt is open to the idea of placement and admits she needs more therapy before going home. Will follow for acute PT needs.    Plan  Recommend Physical Therapy 4 times per week until therapy goals are met for the following treatments:  Bed Mobility, Gait Training, Neuro Re-Education / Balance, Self Care/Home Evaluation, Stair Training, Therapeutic Activities and Therapeutic Exercises  DC Equipment Recommendations: Unable to determine at this time  Discharge Recommendations: Recommend post-acute placement for additional physical therapy services prior to discharge home          10/24/21 1100   Vitals   O2 Delivery Device None - Room Air   Pain 0 - 10 Group   Therapist Pain Assessment no c/o pain   Prior Living Situation   Prior Services Skilled Home Health Services   Housing / Facility 1 Story Apartment / Condo   Elevator Yes  (to 3rd story apartment)   Bathroom Set up Walk In Shower   Equipment Owned 4-Wheeled Walker;Scooter   Lives with - Patient's Self Care Capacity Alone and  Unable to Care For Self   Comments pt was previously admitted to hospital September of this year s/p multiple B CVA's and was transferred to [facility]. Pt then left [facility] and was home for almost a full week. She reports that she was initially able to move around initially, but then started needing assistance for all functional mobility and realized that she could not live on her own at this time.   Prior Level of Functional Mobility   Bed Mobility Independent   Transfer Status Independent   Ambulation Independent   Distance Ambulation (Feet) household distances   Assistive Devices Used 4-Wheeled Walker   Comments 4WW for household distances, electric scooter for community distances. Pt was IND briefly upon leaving SNF this month, however she quickly regressed and was not able to take care of herself. More details above in Prior Living Situation.   Cognition    Cognition / Consciousness WDL   Comments pleasant and participatory; realizes that she is not able to take care of herself right now and needs placement   Active ROM Lower Body    Active ROM Lower Body  WDL   Strength Lower Body   Lower Body Strength  WDL   Balance Assessment   Sitting Balance (Static) Fair +   Sitting Balance (Dynamic) Fair +   Standing Balance (Static) Fair   Standing Balance (Dynamic) Fair -   Weight Shift Sitting Fair   Weight Shift Standing Poor   Comments FWW in standing   Gait Analysis   Gait Level Of Assist Minimal Assist   Assistive Device Front Wheel Walker   Distance (Feet) 2   # of Times Distance was Traveled 1   Deviation Increased Base Of Support;Decreased Heel Strike;Decreased Toe Off   Weight Bearing Status no restrictions   Comments no LOB, pt ambulated briefly to step and turn to sit down in chair in room   Bed Mobility    Supine to Sit Minimal Assist   Sit to Supine Moderate Assist   Scooting Moderate Assist  (EOB)   Rolling Minimal Assist to Rt.   Comments HOB flat, no bedrails   Functional Mobility   Sit to Stand  Minimal Assist   Bed, Chair, Wheelchair Transfer Moderate Assist   Comments FWW, Min A STS from bed as it was a higher surface than chair which was Mod A   Short Term Goals    Short Term Goal # 1 pt will move supine<>EOB without bed features with SPV within 6 visits to facilitate getting in/out of bed.   Short Term Goal # 2 pt will complete transfers with FWW and SPV within 6 visits to increase household independence.   Short Term Goal # 3 pt will amb 150' with FWW and SPV witin 6 visits for household independence.

## 2021-10-24 NOTE — DISCHARGE PLANNING
Received Choice form at 1622  Agency/Facility Name: Local [place] [facility]  Referral sent per Choice form @ 1446

## 2021-10-24 NOTE — ED NOTES
Patient's home medications have been reviewed by the pharmacy team.     Past Medical History:   Diagnosis Date   • Anxiety    • Depression    • Diabetes    • Hypertension    • Leg pain    • Neuropathy, peripheral    • Stroke (HCC) 2011       Patient's Medications   New Prescriptions    CEFDINIR (OMNICEF) 300 MG CAP    Take 1 Capsule by mouth 2 times a day.   Previous Medications    ACETAMINOPHEN (TYLENOL) 325 MG TAB    Take 2 Tablets by mouth every 6 hours as needed for Mild Pain or Fever.    ASPIRIN (ASA) 81 MG CHEW TAB CHEWABLE TABLET    Chew 1 Tablet every day.    ATORVASTATIN (LIPITOR) 80 MG TABLET    Take 1 Tablet by mouth every evening.    DEXTROSE, DIABETIC USE, (GLUCOSE) 4 G CHEWABLE TABLET    Chew 4 Tablets as needed for Low Blood Sugar (If FSBG is less than or equal to 70 mg/dL and patient able to eat or drink).    ENOXAPARIN (LOVENOX) 60 MG/0.6ML SOLUTION INJ    Inject 60 mg under the skin 2 times a day.    GABAPENTIN (NEURONTIN) 300 MG CAP    Take 1 Capsule by mouth 2 times a day.    GLYBURIDE (DIABETA) 5 MG TAB    Take 1 Tablet by mouth every morning with breakfast.    INSULIN REGULAR (HUMULIN R) 100 UNIT/ML SOLUTION    Inject 1-6 Units under the skin 4 Times a Day,Before Meals and at Bedtime.    LISINOPRIL (PRINIVIL) 5 MG TAB    Take 1 Tablet by mouth every day.    MAGNESIUM HYDROXIDE (MILK OF MAGNESIA) 400 MG/5ML SUSPENSION    Take 30 mL by mouth 1 time a day as needed (if polyethylene glycol ineffective after 24 hours).    METOPROLOL TARTRATE (LOPRESSOR) 50 MG TAB    Take 1 Tablet by mouth 2 times a day.    POLYETHYLENE GLYCOL/LYTES (MIRALAX) 17 G PACK    Take 1 Packet by mouth 1 time a day as needed (if sennosides and docusate ineffective after 24 hours).    SENNA-DOCUSATE (PERICOLACE OR SENOKOT S) 8.6-50 MG TAB    Take 2 Tablets by mouth 2 times a day.   Modified Medications    No medications on file   Discontinued Medications    No medications on file          A:  Patient left SNF AMA and is  awaiting placement. Medications do not appear to be contributing to current complaints. Unable to obtain MAR from SNF.     P:    Reviewed most recent outpatient medication list with Dr. Flores. Home medications have been reordered as appropriate.    Digna FaganD, BCPS

## 2021-10-24 NOTE — DISCHARGE PLANNING
Met with patient she is agreeable for a blanket referral to all the local SNF's. Verbal choice obtained and faxed to [facility]. Waiting for PT/OT evals.

## 2021-10-24 NOTE — ED NOTES
Unable to complete med rec at this time. Pt was at [facility]. Not able at this time to get information in regards to medications.

## 2021-10-24 NOTE — DISCHARGE PLANNING
Received Choice form at 0821  Agency/Facility Name: Local [place] [facility]  Referral sent per Choice form @ [facility] is waiting for PT/OT notes before sending SNF referral.

## 2021-10-24 NOTE — ED NOTES
Pt incontinent of stool and requesting pure wick to void. Liza care and linen change provided with assistance of addition CNA. Pure wick in place.

## 2021-10-24 NOTE — PROGRESS NOTES
"ED Provider Progress Note    ED Observation Progress Note    Date of Service: 10/24/21    Interval History  Patient is a 66-year-old female who came in for failure to thrive.  Patient was previously at [facility], unclear if she was discharged or left AGAINST MEDICAL ADVICE.  During emergency department course she has been found to have a UTI for which she was treated with ceftriaxone and cefdinir was continued.  She is awaiting PT/OT evaluation and placement at facility as she is not safe to discharge at this time.  Patient's only complaint this morning is her sciatica pain for which she normally takes gabapentin.    Physical Exam  /75   Pulse 61   Temp 36.9 °C (98.4 °F) (Oral)   Resp 16   Ht 1.702 m (5' 7\")   Wt (!) 148 kg (326 lb)   SpO2 93%   BMI 51.06 kg/m² .    Constitutional: Awake and alert.  Obese female  HENT:  Grossly normal  Eyes: Grossly normal  Neck: Normal range of motion  Cardiovascular: Normal heart rate   Thorax & Lungs: No respiratory distress  Abdomen: Nontender  Skin:  No pathologic rash.   Extremities: Well perfused  Psychiatric: Affect normal    Labs  Results for orders placed or performed during the hospital encounter of 10/23/21   CBC WITH DIFFERENTIAL   Result Value Ref Range    WBC 9.4 4.8 - 10.8 K/uL    RBC 4.33 4.20 - 5.40 M/uL    Hemoglobin 12.8 12.0 - 16.0 g/dL    Hematocrit 40.1 37.0 - 47.0 %    MCV 92.6 81.4 - 97.8 fL    MCH 29.6 27.0 - 33.0 pg    MCHC 31.9 (L) 33.6 - 35.0 g/dL    RDW 42.7 35.9 - 50.0 fL    Platelet Count 245 164 - 446 K/uL    MPV 11.7 9.0 - 12.9 fL    Neutrophils-Polys 65.20 44.00 - 72.00 %    Lymphocytes 25.70 22.00 - 41.00 %    Monocytes 7.20 0.00 - 13.40 %    Eosinophils 1.40 0.00 - 6.90 %    Basophils 0.30 0.00 - 1.80 %    Immature Granulocytes 0.20 0.00 - 0.90 %    Nucleated RBC 0.00 /100 WBC    Neutrophils (Absolute) 6.12 2.00 - 7.15 K/uL    Lymphs (Absolute) 2.41 1.00 - 4.80 K/uL    Monos (Absolute) 0.68 0.00 - 0.85 K/uL    Eos (Absolute) " 0.13 0.00 - 0.51 K/uL    Baso (Absolute) 0.03 0.00 - 0.12 K/uL    Immature Granulocytes (abs) 0.02 0.00 - 0.11 K/uL    NRBC (Absolute) 0.00 K/uL   COMP METABOLIC PANEL   Result Value Ref Range    Sodium 139 135 - 145 mmol/L    Potassium 4.2 3.6 - 5.5 mmol/L    Chloride 107 96 - 112 mmol/L    Co2 22 20 - 33 mmol/L    Anion Gap 10.0 7.0 - 16.0    Glucose 122 (H) 65 - 99 mg/dL    Bun 20 8 - 22 mg/dL    Creatinine 0.74 0.50 - 1.40 mg/dL    Calcium 8.8 8.5 - 10.5 mg/dL    AST(SGOT) 20 12 - 45 U/L    ALT(SGPT) 17 2 - 50 U/L    Alkaline Phosphatase 89 30 - 99 U/L    Total Bilirubin 0.8 0.1 - 1.5 mg/dL    Albumin 3.6 3.2 - 4.9 g/dL    Total Protein 6.9 6.0 - 8.2 g/dL    Globulin 3.3 1.9 - 3.5 g/dL    A-G Ratio 1.1 g/dL   TROPONIN   Result Value Ref Range    Troponin T 14 6 - 19 ng/L   URINALYSIS (UA)    Specimen: Urine, Clean Catch   Result Value Ref Range    Color DK Yellow     Character Cloudy (A)     Specific Gravity 1.024 <1.035    Ph 5.0 5.0 - 8.0    Glucose Negative Negative mg/dL    Ketones Negative Negative mg/dL    Protein Negative Negative mg/dL    Bilirubin Negative Negative    Urobilinogen, Urine 1.0 Negative    Nitrite Positive (A) Negative    Leukocyte Esterase Small (A) Negative    Occult Blood Negative Negative    Micro Urine Req Microscopic    ESTIMATED GFR   Result Value Ref Range    GFR If African American >60 >60 mL/min/1.73 m 2    GFR If Non African American >60 >60 mL/min/1.73 m 2   URINE MICROSCOPIC (W/UA)   Result Value Ref Range    WBC 20-50 (A) /hpf    RBC 2-5 (A) /hpf    Bacteria Many (A) None /hpf    Epithelial Cells Few /hpf    Hyaline Cast 0-2 /lpf   EKG (NOW)   Result Value Ref Range    Report       [facility] Emergency Dept.    Test Date:  2021-10-23  Pt Name:    GERA BAJWA                Department: ER  MRN:        [id number]                      Room:       [facility]  Gender:     Female                       Technician: [id number]  :        1955                    Requested By:DURGA WEN  Order #:    [id number]                    Reading MD: Durga Wen    Measurements  Intervals                                Axis  Rate:       60                           P:          24  CO:         228                          QRS:        -49  QRSD:       108                          T:          61  QT:         444  QTc:        444    Interpretive Statements  SINUS RHYTHM rate of 60  Poor R wave progression  No ST elevation or depression  T wave flattening aVL and lead III  FIRST DEGREE AV BLOCK  LEFT ANTERIOR FASCICULAR BLOCK  CONSIDER ANTERIOR INFARCT  Compared to ECG 09/19/2021 09:37:14  First degree AV block now present  Left anterior fascicular block now present  Ectop ic atrial rhythm no longer present  Myocardial infarct finding still present  Electronically Signed On 10- 16:18:24 PDT by Durga Wen     POCT glucose device results   Result Value Ref Range    Glucose - Accu-Ck 134 (H) 65 - 99 mg/dL       Radiology  DX-CHEST-PORTABLE (1 VIEW)   Final Result         1. No acute cardiopulmonary abnormalities are identified.          Problem List  1.  Failure to thrive-awaiting safe discharge plan as patient is unable to care for herself, case management aware, PT OT evaluation pending  2.  Urinary tract infection, patient on cefdinir at this time  3.  Sciatica-continue home gabapentin  4.  Hypertension-continue home medications  5.  Diabetes-continue home medications  6.  Hyperlipidemia-continue home medications      Electronically signed by: Moira Flores M.D., 10/24/2021 12:17 PM

## 2021-10-24 NOTE — DISCHARGE PLANNING
note:  CANDICE talked to Ladonna at [facility] and she confirmed that skilled nursing facilities would need a single service PT or OT or sometimes she just needs an order from an MD. Ladonna also said that the COVID exception for Medicare has been extended to January.     CANDICE ask [facility] to send referrals to SNF.

## 2021-10-25 VITALS
TEMPERATURE: 98.6 F | OXYGEN SATURATION: 96 % | RESPIRATION RATE: 17 BRPM | HEIGHT: 67 IN | DIASTOLIC BLOOD PRESSURE: 74 MMHG | HEART RATE: 65 BPM | SYSTOLIC BLOOD PRESSURE: 168 MMHG | WEIGHT: 293 LBS | BODY MASS INDEX: 45.99 KG/M2

## 2021-10-25 LAB
BACTERIA UR CULT: ABNORMAL
BACTERIA UR CULT: ABNORMAL
GLUCOSE BLD-MCNC: 150 MG/DL (ref 65–99)
GLUCOSE BLD-MCNC: 75 MG/DL (ref 65–99)
GLUCOSE BLD-MCNC: 95 MG/DL (ref 65–99)
GLUCOSE BLD-MCNC: 99 MG/DL (ref 65–99)
SIGNIFICANT IND 70042: ABNORMAL
SITE SITE: ABNORMAL
SOURCE SOURCE: ABNORMAL

## 2021-10-25 PROCEDURE — A9270 NON-COVERED ITEM OR SERVICE: HCPCS | Performed by: EMERGENCY MEDICINE

## 2021-10-25 PROCEDURE — 700102 HCHG RX REV CODE 250 W/ 637 OVERRIDE(OP): Performed by: EMERGENCY MEDICINE

## 2021-10-25 PROCEDURE — 82962 GLUCOSE BLOOD TEST: CPT | Mod: 91

## 2021-10-25 PROCEDURE — 97166 OT EVAL MOD COMPLEX 45 MIN: CPT

## 2021-10-25 RX ADMIN — ASPIRIN 81 MG: 81 TABLET, CHEWABLE ORAL at 06:05

## 2021-10-25 RX ADMIN — GABAPENTIN 300 MG: 300 CAPSULE ORAL at 17:59

## 2021-10-25 RX ADMIN — LISINOPRIL 5 MG: 10 TABLET ORAL at 06:05

## 2021-10-25 RX ADMIN — GABAPENTIN 300 MG: 300 CAPSULE ORAL at 06:05

## 2021-10-25 RX ADMIN — CEFDINIR 300 MG: 300 CAPSULE ORAL at 17:59

## 2021-10-25 RX ADMIN — ATORVASTATIN CALCIUM 80 MG: 20 TABLET, FILM COATED ORAL at 17:59

## 2021-10-25 RX ADMIN — GLYBURIDE 5 MG: 5 TABLET ORAL at 09:30

## 2021-10-25 RX ADMIN — CEFDINIR 300 MG: 300 CAPSULE ORAL at 06:05

## 2021-10-25 RX ADMIN — METOPROLOL TARTRATE 50 MG: 50 TABLET, FILM COATED ORAL at 09:30

## 2021-10-25 ASSESSMENT — COGNITIVE AND FUNCTIONAL STATUS - GENERAL
DAILY ACTIVITIY SCORE: 17
TOILETING: A LITTLE
HELP NEEDED FOR BATHING: A LOT
PERSONAL GROOMING: A LITTLE
DRESSING REGULAR UPPER BODY CLOTHING: A LITTLE
DRESSING REGULAR LOWER BODY CLOTHING: A LOT
SUGGESTED CMS G CODE MODIFIER DAILY ACTIVITY: CK

## 2021-10-25 ASSESSMENT — ACTIVITIES OF DAILY LIVING (ADL): TOILETING: INDEPENDENT

## 2021-10-25 ASSESSMENT — PAIN DESCRIPTION - PAIN TYPE
TYPE: ACUTE PAIN
TYPE: ACUTE PAIN

## 2021-10-25 NOTE — DISCHARGE PLANNING
note:  Kayla from [place] accepted pt.   She requested transport to pickup at 1600.    PCS form completed.   CM called [facility], talked to Tita. CM explained that pt is barriatric at 328 lbs. Pt is weak and needs assistance. Tita from [facility] agreed that [facility] stretcher would be the best option for pt.     [facility] called [facility] for schedule.   Trip # [id number].   at 1600  Dr. Flores aware.

## 2021-10-25 NOTE — DISCHARGE PLANNING
" note:  RN asked CM to talk to pt.     CM met with pt . Pt very upset about Ayana saying she left AMA.  Pt said \" I didn't leave AMA\" \" I was just very vocal about wanting to go home\" Explained to pt that when a facility wants her to stay because she has not reached her goals with PT for example and she insists on going home then they see that as AMA. Pt confirmed again \" I dont want to live in a nursing home , I just need a few more days to get stronger\" \" I have a life at home and a dog so I want to be able to go home eventually\". Explained to pt that CANDICE is trying to find her placement and actually talked to Kayla at [facility] so she is trying to confirm a bed for her. Pt said she will go to [facility] if they accept her.     LVM for Kayla.     Addendum:  Of note, during my second visit with pt, pt was working with PT and able to ambulate with a walker with the assistance of the PT. Pt said \" you can see Im trying\".  "

## 2021-10-25 NOTE — DISCHARGE PLANNING
Dr. Flores agreeable with discharge.   MD signed COBRA and Med Rec completed.   Pt aware that she has been accepted by [facility].   She is agreeable to go to [facility].

## 2021-10-25 NOTE — ED NOTES
Pt's breakfast tray delivered.  Pt updated on POC.  Pt unhappy about wait for acceptance/transfer.  Apologized and updated.  All needs met.

## 2021-10-25 NOTE — ED NOTES
Pt provided with full linen change and new PureWick.  PT eval complete.  CM has been back to the bedside to discuss POC w/ patient.

## 2021-10-25 NOTE — DISCHARGE PLANNING
note:  Talked to Nicole at [facility] and she had to decline because they do not accept the Medicare COVID exception.       However, noted that [facility] has accepted the pt . CM LVM for their liaison.

## 2021-10-25 NOTE — DISCHARGE SUMMARY
"  ED Observation Discharge Summary  Patient:Ariella Sanchez  Patient : 1955  Patient MRN: [id number]  Patient PCP: Ever Chamberlain M.D.    Admit Date: 10/23/2021  Discharge Date and Time: 10/25/21 2:02 PM  Discharge Diagnosis: Debility/UTI  Discharge Attending: Moira Flores M.D.  Discharge Service: ED Observation    ED Course  Ariella is a 66 y.o. female who was evaluated at [facility] for weakness and failure to thrive.  Patient was in the ED and evaluated for weakness and found to have a UTI. She was unable to safely discharge home as she cannot care for herself without assistance. Therefore she was evaluated by PT/ OT and ultimately placement was found at skilled nursing facility.  Her urinary tract infection was treated with cefdinir and home medications were otherwise resumed.  She is transferred in stable condition.    Discharge Exam:  /70   Pulse 71   Temp 37.1 °C (98.8 °F) (Temporal)   Resp 15   Ht 1.702 m (5' 7\")   Wt (!) 148 kg (326 lb)   SpO2 97%   BMI 51.06 kg/m² .    Constitutional: Awake and alert. Nontoxic  HENT:  Grossly normal  Eyes: Grossly normal  Neck: Normal range of motion  Cardiovascular: Normal heart rate   Thorax & Lungs: No respiratory distress  Abdomen: Nontender  Skin:  No pathologic rash.   Extremities: Well perfused  Psychiatric: Affect normal    Labs  Results for orders placed or performed during the hospital encounter of 10/23/21   CBC WITH DIFFERENTIAL   Result Value Ref Range    WBC 9.4 4.8 - 10.8 K/uL    RBC 4.33 4.20 - 5.40 M/uL    Hemoglobin 12.8 12.0 - 16.0 g/dL    Hematocrit 40.1 37.0 - 47.0 %    MCV 92.6 81.4 - 97.8 fL    MCH 29.6 27.0 - 33.0 pg    MCHC 31.9 (L) 33.6 - 35.0 g/dL    RDW 42.7 35.9 - 50.0 fL    Platelet Count 245 164 - 446 K/uL    MPV 11.7 9.0 - 12.9 fL    Neutrophils-Polys 65.20 44.00 - 72.00 %    Lymphocytes 25.70 22.00 - 41.00 %    Monocytes 7.20 0.00 - 13.40 %    Eosinophils 1.40 0.00 - 6.90 %    Basophils 0.30 0.00 - " 1.80 %    Immature Granulocytes 0.20 0.00 - 0.90 %    Nucleated RBC 0.00 /100 WBC    Neutrophils (Absolute) 6.12 2.00 - 7.15 K/uL    Lymphs (Absolute) 2.41 1.00 - 4.80 K/uL    Monos (Absolute) 0.68 0.00 - 0.85 K/uL    Eos (Absolute) 0.13 0.00 - 0.51 K/uL    Baso (Absolute) 0.03 0.00 - 0.12 K/uL    Immature Granulocytes (abs) 0.02 0.00 - 0.11 K/uL    NRBC (Absolute) 0.00 K/uL   COMP METABOLIC PANEL   Result Value Ref Range    Sodium 139 135 - 145 mmol/L    Potassium 4.2 3.6 - 5.5 mmol/L    Chloride 107 96 - 112 mmol/L    Co2 22 20 - 33 mmol/L    Anion Gap 10.0 7.0 - 16.0    Glucose 122 (H) 65 - 99 mg/dL    Bun 20 8 - 22 mg/dL    Creatinine 0.74 0.50 - 1.40 mg/dL    Calcium 8.8 8.5 - 10.5 mg/dL    AST(SGOT) 20 12 - 45 U/L    ALT(SGPT) 17 2 - 50 U/L    Alkaline Phosphatase 89 30 - 99 U/L    Total Bilirubin 0.8 0.1 - 1.5 mg/dL    Albumin 3.6 3.2 - 4.9 g/dL    Total Protein 6.9 6.0 - 8.2 g/dL    Globulin 3.3 1.9 - 3.5 g/dL    A-G Ratio 1.1 g/dL   TROPONIN   Result Value Ref Range    Troponin T 14 6 - 19 ng/L   URINALYSIS (UA)    Specimen: Urine, Clean Catch   Result Value Ref Range    Color DK Yellow     Character Cloudy (A)     Specific Gravity 1.024 <1.035    Ph 5.0 5.0 - 8.0    Glucose Negative Negative mg/dL    Ketones Negative Negative mg/dL    Protein Negative Negative mg/dL    Bilirubin Negative Negative    Urobilinogen, Urine 1.0 Negative    Nitrite Positive (A) Negative    Leukocyte Esterase Small (A) Negative    Occult Blood Negative Negative    Micro Urine Req Microscopic    ESTIMATED GFR   Result Value Ref Range    GFR If African American >60 >60 mL/min/1.73 m 2    GFR If Non African American >60 >60 mL/min/1.73 m 2   URINE MICROSCOPIC (W/UA)   Result Value Ref Range    WBC 20-50 (A) /hpf    RBC 2-5 (A) /hpf    Bacteria Many (A) None /hpf    Epithelial Cells Few /hpf    Hyaline Cast 0-2 /lpf   URINE CULTURE(NEW)    Specimen: Urine, Clean Catch   Result Value Ref Range    Significant Indicator POS (POS)      Source UR     Site URINE, CLEAN CATCH     Culture Result Usual urogenital gisela ,000 cfu/mL (A)     Culture Result Proteus mirabilis  ,000 cfu/mL   (A)        Susceptibility    Proteus mirabilis - NATHANIEL     Ampicillin >16 Resistant mcg/mL     Ceftriaxone <=1 Sensitive mcg/mL     Cefazolin >16 Resistant mcg/mL     Ciprofloxacin >2 Resistant mcg/mL     Cefepime <=2 Sensitive mcg/mL     Ampicillin/sulbactam >16/8 Resistant mcg/mL     Cefuroxime <=4 Sensitive mcg/mL     Tobramycin <=2 Sensitive mcg/mL     Nitrofurantoin >64 Resistant mcg/mL     Gentamicin <=2 Sensitive mcg/mL     Levofloxacin >4 Resistant mcg/mL     Pip/Tazobactam <=8 Sensitive mcg/mL     Trimeth/Sulfa <=0.5/9.5 Sensitive mcg/mL   EKG (NOW)   Result Value Ref Range    Report       [facility] Emergency Dept.    Test Date:  2021-10-23  Pt Name:    GERA BAJWA                Department: ER  MRN:        [id number]                      Room:       [facility]  Gender:     Female                       Technician: [id number]  :        1955                   Requested By:ALEYDA WEN  Order #:    [id number]                    Reading MD: Aleyda Wen    Measurements  Intervals                                Axis  Rate:       60                           P:          24  UT:         228                          QRS:        -49  QRSD:       108                          T:          61  QT:         444  QTc:        444    Interpretive Statements  SINUS RHYTHM rate of 60  Poor R wave progression  No ST elevation or depression  T wave flattening aVL and lead III  FIRST DEGREE AV BLOCK  LEFT ANTERIOR FASCICULAR BLOCK  CONSIDER ANTERIOR INFARCT  Compared to ECG 2021 09:37:14  First degree AV block now present  Left anterior fascicular block now present  Ectop ic atrial rhythm no longer present  Myocardial infarct finding still present  Electronically Signed On 10- 16:18:24 PDT by Aleyda Wen     POCT glucose device results   Result  Value Ref Range    Glucose - Accu-Ck 134 (H) 65 - 99 mg/dL   POCT glucose device results   Result Value Ref Range    Glucose - Accu-Ck 70 65 - 99 mg/dL   POCT glucose device results   Result Value Ref Range    Glucose - Accu-Ck 99 65 - 99 mg/dL   POCT glucose device results   Result Value Ref Range    Glucose - Accu-Ck 95 65 - 99 mg/dL   POCT glucose device results   Result Value Ref Range    Glucose - Accu-Ck 150 (H) 65 - 99 mg/dL       Radiology  DX-CHEST-PORTABLE (1 VIEW)   Final Result         1. No acute cardiopulmonary abnormalities are identified.          Disposition: Discharged to skilled nursing facility    Medications:   New Prescriptions    CEFDINIR (OMNICEF) 300 MG CAP    Take 1 Capsule by mouth 2 times a day.       Discharge Condition: Stable    Electronically signed by: Moira Flores M.D., 10/25/2021 2:02 PM

## 2021-10-25 NOTE — ED NOTES
Pt placed in a hospital bed for comfort. Purewick in place and personal belongings within reach. Pt denies further needs at this time.

## 2021-10-25 NOTE — DISCHARGE PLANNING
Anticipated Discharge Disposition:   SNF    Action:   Met with pt. She lives in a senior citizen housing , she has a power chair and a walker. She said that she was at [facility] and thought that she was strong enough to care for herself but it proved to be difficult so she wants to return to STR to get stronger but not to live there permanently.     She has a walker and a power scooter at home and she has [facility]. She said that a homehealth aide helps her shower once a week.    CM explained that we have been following with various facilities but [facility] has declined. Explained that as per Angelica at [facility], they declined because pt left AMA. Pt said she did not leave AMA but voiced her opinion that she wants to go home and she honestly thought that she was stronger.    CM attempted to call [facility] and [facility] but no answer. This was escalated to CM leaders. CM talked to Nicole at [facility] and explained that pt does not want a LTC bed but wants STR. ( [facility] declined because they thought this is LTC placement). So Nicole requested for CM to resend referral and she will reconsider the pt. Notified RACIEL Lisa.     Barriers to Discharge:  Pending SNF acceptance.     Plan:   Follow up with DPA and SNF facilities.

## 2021-10-25 NOTE — DISCHARGE PLANNING
Received request from KHUSHI Cortez to resend referral to [facility], sent at [id number]. Will follow up.

## 2021-10-25 NOTE — DISCHARGE PLANNING
note:  CANDICE talked to Angelica at [facility] and because pt left AMA from their facility she would need to discuss this with her team. She will call CANDICE back.

## 2021-10-25 NOTE — DISCHARGE PLANNING
Pt uses a power chair and walker. She lives alone in a senior citizen apartment, they have an elevator . Has [facility] visiting her and a home health aide comes on a week to help her shower. She wants to return back to the apartment once she is stronger. The apartment is called [facility] which is a senior citizen independent living apartment.     Care Transition Team Assessment    Information Source  Orientation Level: Oriented X4  Information Given By: Patient  Informant's Name: Ariella  Who is responsible for making decisions for patient? : Patient    Readmission Evaluation  Is this a readmission?: No    Elopement Risk  Legal Hold: No    Interdisciplinary Discharge Planning  Does Admitting Nurse Feel This Could be a Complex Discharge?: No  Primary Care Physician: Ever Chamberlain  Lives with - Patient's Self Care Capacity: Alone and Able to Care For Self  Support Systems: Other (Comments) (lives in senior living apartment)  Housing / Facility: [address] [place] [address] / Condo (they have an elevator)  Name of Care Facility: [facility]  Do You Take your Prescribed Medications Regularly: Yes  Able to Return to Previous ADL's: Yes  Mobility Issues: Yes  Prior Services: Other (Comments)  Patient Prefers to be Discharged to:: SNF vs HH  Assistance Needed: Yes  Durable Medical Equipment: Walker, Other - Specify (Pt has a power scooter at home)    Discharge Preparedness  What is your plan after discharge?: Uncertain - pending medical team collaboration  What are your discharge supports?: Other (comment)  Prior Functional Level: Needs Assist with Activities of Daily Living, Needs Assist with Medication Management, Uses Wheelchair  Difficulity with ADLs: Walking  Difficulity with IADLs: Driving    Functional Assesment  Prior Functional Level: Needs Assist with Activities of Daily Living, Needs Assist with Medication Management, Uses Wheelchair    Finances  Financial Barriers to Discharge: No  Prescription Coverage: Yes    Vision  / Hearing Impairment  Vision Impairment : No  Hearing Impairment : No    Domestic Abuse  Have you ever been the victim of abuse or violence?: No    Discharge Risks or Barriers  Discharge risks or barriers?: No  Patient risk factors: Lives alone and no community support    Anticipated Discharge Information  Discharge Disposition: D/T to SNF with Medicare cert in anticipation of skilled care (03)

## 2021-10-26 NOTE — THERAPY
"Occupational Therapy   Initial Evaluation     Patient Name: Ariella Sanchez  Age:  66 y.o., Sex:  female  Medical Record #: [id number]  Today's Date: 10/25/2021     Precautions  Precautions: Fall Risk    Assessment  Patient is 66 y.o. female admitted to the hospital with inability to care for self after leaving SNF. Pt reports that she was fully independent prior to initial hospitalization and SNF. During OT eval, pt required assist with bed mobility, LB dressing and toileting. She fatigued with static standing. Pt would benefit from OT services in the acute care setting to maximize safety and independence with ADLs and ADL transfers.        Plan    Recommend Occupational Therapy 3 times per week until therapy goals are met for the following treatments:  Adaptive Equipment, Self Care/Activities of Daily Living, Therapeutic Activities and Therapeutic Exercises.    DC Equipment Recommendations: Unable to determine at this time  Discharge Recommendations: Recommend post-acute occupational therapy after discharge    Subjective    \"I don't want to live in a nursing home. I have a life at home.\"      Objective       10/25/21 1230   Prior Living Situation   Prior Services Skilled Home Health Services   Housing / Facility 1 Story Apartment / Condo   Elevator Yes   Bathroom Set up Walk In Shower   Equipment Owned Scooter;4-Wheel Walker   Lives with - Patient's Self Care Capacity Alone and Able to Care For Self   Prior Level of ADL Function   Self Feeding Independent   Grooming / Hygiene Independent   Bathing Independent   Dressing Independent   Toileting Independent   Prior Level of IADL Function   Medication Management Independent   Laundry Independent   Kitchen Mobility Independent   Home Management Independent   Prior Level Of Mobility Independent With Device in Home   Precautions   Precautions Fall Risk   Vitals   O2 Delivery Device None - Room Air   Pain 0 - 10 Group   Therapist Pain Assessment During Activity;Nurse " Notified;0   Cognition    Cognition / Consciousness WDL   Active ROM Upper Body   Active ROM Upper Body  WDL   Dominant Hand Right   Strength Upper Body   Upper Body Strength  WDL   Upper Body Muscle Tone   Upper Body Muscle Tone  WDL   Coordination Upper Body   Coordination WDL   Balance Assessment   Sitting Balance (Static) Fair +   Sitting Balance (Dynamic) Fair +   Standing Balance (Static) Fair   Standing Balance (Dynamic) Fair -   Weight Shift Sitting Fair   Weight Shift Standing Fair   Comments standing with FWW   Bed Mobility    Supine to Sit Minimal Assist   Sit to Supine Minimal Assist   Scooting Minimal Assist   Comments HOB flat    ADL Assessment   Grooming Supervision;Seated  (attempted in standing but fatigued and completed in sitting)   Lower Body Dressing Maximal Assist   Toileting Minimal Assist   How much help from another person does the patient currently need...   Putting on and taking off regular lower body clothing? 2   Bathing (including washing, rinsing, and drying)? 2   Toileting, which includes using a toilet, bedpan, or urinal? 3   Putting on and taking off regular upper body clothing? 3   Taking care of personal grooming such as brushing teeth? 3   Eating meals? 4   6 Clicks Daily Activity Score 17   Functional Mobility   Sit to Stand Minimal Assist   Bed, Chair, Wheelchair Transfer Minimal Assist   Mobility walked to/from sink with FWW, no LOB noted, no assist required    Patient / Family Goals   Patient / Family Goal #1 to get stronger   Short Term Goals   Short Term Goal # 1 Pt will stand at the sink to complete grooming with supervision   Short Term Goal # 2 Pt will complete LB dressing with AE   Short Term Goal # 3 Pt will complete toilet transers with supervision    Education Group   Education Provided Role of Occupational Therapist   Role of Occupational Therapist Patient Response Patient;Acceptance;Explanation;Verbal Demonstration   Problem List   Problem List Decreased Active  Daily Living Skills;Decreased Homemaking Skills;Decreased Functional Mobility;Decreased Activity Tolerance;Impaired Postural Control / Balance

## 2021-10-26 NOTE — ED NOTES
Pt assisted to EMS gurney.  Pt discharged home via EMS ambulance transfer.  All belongings in possesion at discharge.

## 2021-10-26 NOTE — DISCHARGE PLANNING
Anticipated Discharge Disposition:   Home with HH    Action:   Conference call made with brother John, pt, this CM and RN at the bedside on speaker phone. John said that pt as a good infrastructure of family around her that can help her at home. John agreeable that sending her home is a good plan and pt agrees.    Pt said that she is already receiving services with [facility]. Choice made and faxed to [facility]. Original choice left on [facility] desk as it is after hrs.     Notified Dr. Parson of above and he is agreeable to order HH.     CM called Lex[facility] at [facility]. He is agreeable and scheduled pt for  at 7pm. PCS form faxed to [facility]..    Pt confirmed she has all the DME she needs at home, she has an elevator where she lives. Family will help her as confirmed by her brother.       Barriers to Discharge:   None     Plan:   DC home once [facility] arrives.

## 2021-10-26 NOTE — FACE TO FACE
Face to Face Supporting Documentation - Home Health    The encounter with this patient was in whole or in part the primary reason for home health admission.    Date of encounter:   Patient:                    MRN:                       YOB: 2023  Ariella Sanchez  [id number]  1955     Home health to see patient for:  Skilled Nursing care for assessment, interventions & education and Physical Therapy evaluation and treatment    Skilled need for:  Medication Management medications,     Skilled nursing interventions to include:  Comment: PT for weakness    Homebound status evidenced by:  Needs the assistance of another person in order to leave the home. Leaving home requires a considerable and taxing effort. There is a normal inability to leave the home.    Community Physician to provide follow up care: Ever Chamberlain M.D.     Optional Interventions? No      I certify the face to face encounter for this home health care referral meets the CMS requirements and the encounter/clinical assessment with the patient was, in whole, or in part, for the medical condition(s) listed above, which is the primary reason for home health care. Based on my clinical findings: the service(s) are medically necessary, support the need for home health care, and the homebound criteria are met.  I certify that this patient has had a face to face encounter by myself.  Donald Parson M.D. - NPI: [id number]

## 2021-10-26 NOTE — ED NOTES
[facility] here to take patient home.  Pt given d/c instructions, f/u info and RX x 1 with verbal understanding.  Pt states her brother will be at her house when she arrives tonight and can fill her prescription tomorrow AM.  VSS at discharge.  PIV d/c'd with tip intact.  Pt dressing at this time.

## 2021-10-26 NOTE — ED PROVIDER NOTES
"Patient:Ariella Sanchez  Patient : 1955  Patient MRN: [id number]  Patient PCP: Ever Chamberlain M.D.    Admit Date: 10/23/2021  Discharge Date and Time: 10/25/21 6:27 PM  Discharge Diagnosis:   1. Acute UTI Acute   2. Generalized weakness Acute     Discharge Attending:Donald Parson M.D.   Discharge Service: ED Observation    ED Course  Ariella is a 66 y.o. female who was evaluated at [facility] for weakness and failure to thrive.      6:33 PM The patient was supposed to be discharged to [facility], however they have refused the patient. The  has spoken with the patient's family, and they are willing to take the patient home with Homehealth.     7:03 PM Patient taken off ED Observation and discharged at this time.       Discharge Exam:  BP (!) 168/74   Pulse 65   Temp 37 °C (98.6 °F) (Temporal)   Resp 17   Ht 1.702 m (5' 7\")   Wt (!) 148 kg (326 lb)   SpO2 96%   BMI 51.06 kg/m² .    Constitutional: Awake and alert. Nontoxic  HENT:  Grossly normal  Eyes: Grossly normal  Neck: Normal range of motion  Cardiovascular: Normal heart rate   Thorax & Lungs: No respiratory distress  Abdomen: Nontender  Skin:  No pathologic rash.   Extremities: Well perfused  Psychiatric: Affect normal    Labs  Results for orders placed or performed during the hospital encounter of 10/23/21   CBC WITH DIFFERENTIAL   Result Value Ref Range    WBC 9.4 4.8 - 10.8 K/uL    RBC 4.33 4.20 - 5.40 M/uL    Hemoglobin 12.8 12.0 - 16.0 g/dL    Hematocrit 40.1 37.0 - 47.0 %    MCV 92.6 81.4 - 97.8 fL    MCH 29.6 27.0 - 33.0 pg    MCHC 31.9 (L) 33.6 - 35.0 g/dL    RDW 42.7 35.9 - 50.0 fL    Platelet Count 245 164 - 446 K/uL    MPV 11.7 9.0 - 12.9 fL    Neutrophils-Polys 65.20 44.00 - 72.00 %    Lymphocytes 25.70 22.00 - 41.00 %    Monocytes 7.20 0.00 - 13.40 %    Eosinophils 1.40 0.00 - 6.90 %    Basophils 0.30 0.00 - 1.80 %    Immature Granulocytes 0.20 0.00 - 0.90 %    Nucleated RBC 0.00 /100 WBC    Neutrophils " (Absolute) 6.12 2.00 - 7.15 K/uL    Lymphs (Absolute) 2.41 1.00 - 4.80 K/uL    Monos (Absolute) 0.68 0.00 - 0.85 K/uL    Eos (Absolute) 0.13 0.00 - 0.51 K/uL    Baso (Absolute) 0.03 0.00 - 0.12 K/uL    Immature Granulocytes (abs) 0.02 0.00 - 0.11 K/uL    NRBC (Absolute) 0.00 K/uL   COMP METABOLIC PANEL   Result Value Ref Range    Sodium 139 135 - 145 mmol/L    Potassium 4.2 3.6 - 5.5 mmol/L    Chloride 107 96 - 112 mmol/L    Co2 22 20 - 33 mmol/L    Anion Gap 10.0 7.0 - 16.0    Glucose 122 (H) 65 - 99 mg/dL    Bun 20 8 - 22 mg/dL    Creatinine 0.74 0.50 - 1.40 mg/dL    Calcium 8.8 8.5 - 10.5 mg/dL    AST(SGOT) 20 12 - 45 U/L    ALT(SGPT) 17 2 - 50 U/L    Alkaline Phosphatase 89 30 - 99 U/L    Total Bilirubin 0.8 0.1 - 1.5 mg/dL    Albumin 3.6 3.2 - 4.9 g/dL    Total Protein 6.9 6.0 - 8.2 g/dL    Globulin 3.3 1.9 - 3.5 g/dL    A-G Ratio 1.1 g/dL   TROPONIN   Result Value Ref Range    Troponin T 14 6 - 19 ng/L   URINALYSIS (UA)    Specimen: Urine, Clean Catch   Result Value Ref Range    Color DK Yellow     Character Cloudy (A)     Specific Gravity 1.024 <1.035    Ph 5.0 5.0 - 8.0    Glucose Negative Negative mg/dL    Ketones Negative Negative mg/dL    Protein Negative Negative mg/dL    Bilirubin Negative Negative    Urobilinogen, Urine 1.0 Negative    Nitrite Positive (A) Negative    Leukocyte Esterase Small (A) Negative    Occult Blood Negative Negative    Micro Urine Req Microscopic    ESTIMATED GFR   Result Value Ref Range    GFR If African American >60 >60 mL/min/1.73 m 2    GFR If Non African American >60 >60 mL/min/1.73 m 2   URINE MICROSCOPIC (W/UA)   Result Value Ref Range    WBC 20-50 (A) /hpf    RBC 2-5 (A) /hpf    Bacteria Many (A) None /hpf    Epithelial Cells Few /hpf    Hyaline Cast 0-2 /lpf   URINE CULTURE(NEW)    Specimen: Urine, Clean Catch   Result Value Ref Range    Significant Indicator POS (POS)     Source UR     Site URINE, CLEAN CATCH     Culture Result Usual urogenital gisela ,000 cfu/mL  (A)     Culture Result Proteus mirabilis  ,000 cfu/mL   (A)        Susceptibility    Proteus mirabilis - NATHANIEL     Ampicillin >16 Resistant mcg/mL     Ceftriaxone <=1 Sensitive mcg/mL     Cefazolin >16 Resistant mcg/mL     Ciprofloxacin >2 Resistant mcg/mL     Cefepime <=2 Sensitive mcg/mL     Ampicillin/sulbactam >16/8 Resistant mcg/mL     Cefuroxime <=4 Sensitive mcg/mL     Tobramycin <=2 Sensitive mcg/mL     Nitrofurantoin >64 Resistant mcg/mL     Gentamicin <=2 Sensitive mcg/mL     Levofloxacin >4 Resistant mcg/mL     Pip/Tazobactam <=8 Sensitive mcg/mL     Trimeth/Sulfa <=0.5/9.5 Sensitive mcg/mL   EKG (NOW)   Result Value Ref Range    Report       [facility] Emergency Dept.    Test Date:  2021-10-23  Pt Name:    GERA BAJWA                Department: ER  MRN:        [id number]                      Room:       [facility]  Gender:     Female                       Technician: [id number]  :        1955                   Requested By:DURGA WEN  Order #:    [id number]                    Reading MD: Durga Wen    Measurements  Intervals                                Axis  Rate:       60                           P:          24  NJ:         228                          QRS:        -49  QRSD:       108                          T:          61  QT:         444  QTc:        444    Interpretive Statements  SINUS RHYTHM rate of 60  Poor R wave progression  No ST elevation or depression  T wave flattening aVL and lead III  FIRST DEGREE AV BLOCK  LEFT ANTERIOR FASCICULAR BLOCK  CONSIDER ANTERIOR INFARCT  Compared to ECG 2021 09:37:14  First degree AV block now present  Left anterior fascicular block now present  Ectop ic atrial rhythm no longer present  Myocardial infarct finding still present  Electronically Signed On 10- 16:18:24 PDT by Durga Wen     POCT glucose device results   Result Value Ref Range    Glucose - Accu-Ck 134 (H) 65 - 99 mg/dL   POCT glucose device results   Result  Value Ref Range    Glucose - Accu-Ck 70 65 - 99 mg/dL   POCT glucose device results   Result Value Ref Range    Glucose - Accu-Ck 99 65 - 99 mg/dL   POCT glucose device results   Result Value Ref Range    Glucose - Accu-Ck 95 65 - 99 mg/dL   POCT glucose device results   Result Value Ref Range    Glucose - Accu-Ck 150 (H) 65 - 99 mg/dL   POCT glucose device results   Result Value Ref Range    Glucose - Accu-Ck 75 65 - 99 mg/dL       Radiology  DX-CHEST-PORTABLE (1 VIEW)   Final Result         1. No acute cardiopulmonary abnormalities are identified.          Disposition: Patient was discharged home at 7:03 PM on 10/25/21    Follow up: No follow-ups on file.    Medications:   Discharge Medication List as of 10/25/2021  7:01 PM        START taking these medications    Details   cefdinir (OMNICEF) 300 MG Cap Take 1 Capsule by mouth 2 times a day., Disp-20 Capsule, R-0, Print Rx Paper             Discharge Condition: Stable     Roseanna SIMON (Scribe), am scribing for, and in the presence of, Donald Parson M.D..    Electronically signed by: Roseanna Suh (Scribe), 10/25/2021    Donald SIMON M.D. personally performed the services described in this documentation, as scribed by Roseanna Suh in my presence, and it is both accurate and complete.    The note accurately reflects work and decisions made by me.  Donald Parson M.D.  10/25/2021  9:32 PM

## 2021-10-26 NOTE — ED NOTES
"LATE ENTRY DUE TO Epic DOWNTIME**    1543  CM bedside to discuss POC.  Per CM, pt has acceptance at [facility] and will be transferred via [facility] at 1600.    1640  CM called [facility] to follow-up on ETA.  [facility] reports patient is still in queue for transfer but delay due to busy system.  Pt updated on POC.    1740  CM back to bedside, pt updated that [facility] is no longer accepting patient.      1807  Pt medicated per MAR.  FSBS complete.  Pt provided a snack, awaiting dinner.  RN bedside with speaker phone conversation between patient, pt brother and CM.  Pt's brother states \"there is an infrastructure of people\" to assist patient at home and he feels discharage to home is the best plan.  "

## 2021-10-26 NOTE — DISCHARGE PLANNING
note: Prescription for abx given to pt and she will ask brother to help her pick and also refill her medications.     Packet given to [facility] .  CM heard pt tell [facility] staff that she is DNR.

## 2021-10-26 NOTE — DISCHARGE PLANNING
Received Choice form 10/25 at 1815  Agency/Facility Name: [facility]  Referral sent per Choice form @  [phone]    @0835  Agency/Facility Name: [facility]  Outcome: Pt has been accepted to home health services. Updated ED RN CM.

## 2021-10-27 NOTE — DISCHARGE PLANNING
Late entry for 10/25/21    Received a call at about 100 on 10/25/21 from Kayla at [facility] who notified CM that the  has placed the case on hold because they are not sure if they would get paid by Medicare due to pt not having 3 midnight inpt stay.     Explained to Kayla that pt was at [facility] previously within the past 30 days and went home for about a week so it would be a continued stay in a SNF for STR. Kayla agreed but her  and the DON are reviewing the case so for now are declining pt.     CM called Angeliac at [facility]. She will also talk to her . Explained to her that pt said she did not leave AMA from them . She simply thought she was stronger to be able to go home.     On 10/25 at about 1700   Explained to pt that situation that none of the SNF have accepted her. [facility] did not call us back. And both [facility] and [facility] are reviewing her case. Discussed the plan of returning home.     (There is a note that this CM placed on 10/25/21 at 1822 to follow the plan of )

## 2021-11-01 DIAGNOSIS — N39.0 ACUTE UTI: ICD-10-CM

## 2021-11-01 RX ORDER — CEFDINIR 300 MG/1
300 CAPSULE ORAL 2 TIMES DAILY
COMMUNITY
End: 2021-11-05

## 2021-11-01 RX ORDER — LISINOPRIL 5 MG/1
5 TABLET ORAL DAILY
COMMUNITY
End: 2021-11-01 | Stop reason: SDUPTHER

## 2021-11-01 RX ORDER — GABAPENTIN 300 MG/1
300 CAPSULE ORAL 2 TIMES DAILY
Qty: 180 CAPSULE | Refills: 1 | Status: SHIPPED | OUTPATIENT
Start: 2021-11-01 | End: 2022-04-27

## 2021-11-01 RX ORDER — METOPROLOL TARTRATE 50 MG/1
50 TABLET, FILM COATED ORAL 2 TIMES DAILY
Qty: 60 TABLET | Refills: 3 | Status: SHIPPED | OUTPATIENT
Start: 2021-11-01 | End: 2021-11-29

## 2021-11-01 RX ORDER — LISINOPRIL 5 MG/1
5 TABLET ORAL DAILY
Qty: 90 TABLET | Refills: 1 | Status: SHIPPED | OUTPATIENT
Start: 2021-11-01 | End: 2022-04-25

## 2021-11-01 RX ORDER — ATORVASTATIN CALCIUM 80 MG/1
80 TABLET, FILM COATED ORAL EVERY EVENING
Qty: 90 TABLET | Refills: 1 | Status: SHIPPED | OUTPATIENT
Start: 2021-11-01 | End: 2022-04-25

## 2021-11-01 RX ORDER — GLYBURIDE 5 MG/1
5 TABLET ORAL
Qty: 90 TABLET | Refills: 1 | Status: SHIPPED | OUTPATIENT
Start: 2021-11-01 | End: 2021-11-29

## 2021-11-01 RX ORDER — CEFDINIR 300 MG/1
300 CAPSULE ORAL 2 TIMES DAILY
Qty: 20 CAPSULE | Refills: 0 | Status: SHIPPED | OUTPATIENT
Start: 2021-11-01 | End: 2022-01-25

## 2021-11-01 NOTE — TELEPHONE ENCOUNTER
Pt called she needs lots of refills. Her pharmacy [facility]. Please  Call pt [phone]

## 2021-11-05 ENCOUNTER — OFFICE VISIT (OUTPATIENT)
Dept: MEDICAL GROUP | Facility: OTHER | Age: 66
End: 2021-11-05
Payer: MEDICARE

## 2021-11-05 VITALS
BODY MASS INDEX: 45.99 KG/M2 | RESPIRATION RATE: 18 BRPM | HEART RATE: 66 BPM | OXYGEN SATURATION: 95 % | HEIGHT: 67 IN | SYSTOLIC BLOOD PRESSURE: 126 MMHG | WEIGHT: 293 LBS | DIASTOLIC BLOOD PRESSURE: 82 MMHG

## 2021-11-05 DIAGNOSIS — F39 MOOD DISORDER (HCC): ICD-10-CM

## 2021-11-05 DIAGNOSIS — E66.01 MORBID OBESITY WITH BMI OF 40.0-44.9, ADULT (HCC): ICD-10-CM

## 2021-11-05 DIAGNOSIS — R53.1 LEFT-SIDED WEAKNESS: ICD-10-CM

## 2021-11-05 DIAGNOSIS — Z74.09 IMPAIRED MOBILITY AND ADLS: ICD-10-CM

## 2021-11-05 DIAGNOSIS — E11.9 TYPE 2 DIABETES, DIET CONTROLLED (HCC): ICD-10-CM

## 2021-11-05 DIAGNOSIS — I63.9 CEREBROVASCULAR ACCIDENT (CVA), UNSPECIFIED MECHANISM (HCC): Primary | ICD-10-CM

## 2021-11-05 DIAGNOSIS — Z78.9 IMPAIRED MOBILITY AND ADLS: ICD-10-CM

## 2021-11-05 DIAGNOSIS — R19.5 LOOSE STOOLS: ICD-10-CM

## 2021-11-05 DIAGNOSIS — I10 PRIMARY HYPERTENSION: ICD-10-CM

## 2021-11-05 PROCEDURE — 99214 OFFICE O/P EST MOD 30 MIN: CPT | Performed by: FAMILY MEDICINE

## 2021-11-05 RX ORDER — LOPERAMIDE HYDROCHLORIDE 2 MG/1
2 CAPSULE ORAL 4 TIMES DAILY PRN
Qty: 30 CAPSULE | Refills: 0 | Status: SHIPPED | OUTPATIENT
Start: 2021-11-05 | End: 2022-02-04

## 2021-11-05 ASSESSMENT — FIBROSIS 4 INDEX
FIB4 SCORE: 1.35
FIB4 SCORE: 1.35

## 2021-11-05 NOTE — PROGRESS NOTES
Subjective:   CC:   Chief Complaint   Patient presents with   • Follow-Up     was released from rehab about 2 weeks ago       HPI: Ariella is a 66 y.o. female who presents today for the following problems:    Problem   Cva (Cerebral Vascular Accident) (Hcc)    Recently left SNF following stroke, now with home PT/OT and nursing care. Now here from follow up. Writing is now different and left sided weakness is worse. Word-finding is also somewhat difficult. She is unsure of which medications she is currently on. She endorses decreased appetite and energy since time of stroke, 9/20/2021. She doesn't have the energy to cook, but she doesn't wish for Meals on Wheels because it tastes bad and causes GI upset. She gets some help from her brother, but not with meal preparation.     Impaired Mobility and Adls    Pt desires assisted living with her dog but otherwise doesn't wish for assisted living.     Type 2 diabetes, diet controlled (HCC)    Pt doesn't like to check blood sugar, but is willing to take her DM medications.     Htn (Hypertension)    Reports taking medications as directed.     Spontaneous intraparenchymal intracranial hemorrhage, acute (HCC)       Current Outpatient Medications   Medication Sig Dispense Refill   • rivaroxaban (XARELTO) 20 MG Tab tablet Take 20 mg by mouth with dinner.     • cefdinir (OMNICEF) 300 MG Cap Take 1 Capsule by mouth 2 times a day. 20 Capsule 0   • lisinopril (PRINIVIL) 5 MG Tab Take 1 Tablet by mouth every day. 90 Tablet 1   • metoprolol tartrate (LOPRESSOR) 50 MG Tab Take 1 Tablet by mouth 2 times a day. 60 Tablet 3   • glyBURIDE (DIABETA) 5 MG Tab Take 1 Tablet by mouth every morning with breakfast. 90 Tablet 1   • gabapentin (NEURONTIN) 300 MG Cap Take 1 Capsule by mouth 2 times a day. 180 Capsule 1   • atorvastatin (LIPITOR) 80 MG tablet Take 1 Tablet by mouth every evening. 90 Tablet 1   • lisinopril (PRINIVIL) 5 MG Tab Take 1 Tablet by mouth every day. 30 Tablet 3   •  acetaminophen (TYLENOL) 325 MG Tab Take 2 Tablets by mouth every 6 hours as needed for Mild Pain or Fever. 30 Tablet 0   • aspirin (ASA) 81 MG Chew Tab chewable tablet Chew 1 Tablet every day. 100 Tablet    • enoxaparin (LOVENOX) 60 MG/0.6ML Solution inj Inject 60 mg under the skin 2 times a day.     • insulin regular (HUMULIN R) 100 Unit/mL Solution Inject 1-6 Units under the skin 4 Times a Day,Before Meals and at Bedtime. 10 mL    • Dextrose, Diabetic Use, (GLUCOSE) 4 g chewable tablet Chew 4 Tablets as needed for Low Blood Sugar (If FSBG is less than or equal to 70 mg/dL and patient able to eat or drink). 30 Tablet    • senna-docusate (PERICOLACE OR SENOKOT S) 8.6-50 MG Tab Take 2 Tablets by mouth 2 times a day. 30 Tablet 0   • polyethylene glycol/lytes (MIRALAX) 17 g Pack Take 1 Packet by mouth 1 time a day as needed (if sennosides and docusate ineffective after 24 hours).  3   • magnesium hydroxide (MILK OF MAGNESIA) 400 MG/5ML Suspension Take 30 mL by mouth 1 time a day as needed (if polyethylene glycol ineffective after 24 hours).       No current facility-administered medications for this visit.       Social History     Socioeconomic History   • Marital status: Single     Spouse name: Not on file   • Number of children: Not on file   • Years of education: Not on file   • Highest education level: Not on file   Occupational History   • Not on file   Tobacco Use   • Smoking status: Never Smoker   • Smokeless tobacco: Never Used   Substance and Sexual Activity   • Alcohol use: No   • Drug use: Yes     Types: Marijuana   • Sexual activity: Not on file   Other Topics Concern   • Not on file   Social History Narrative   • Not on file     Social Determinants of Health     Financial Resource Strain:    • Difficulty of Paying Living Expenses:    Food Insecurity:    • Worried About Running Out of Food in the Last Year:    • Ran Out of Food in the Last Year:    Transportation Needs:    • Lack of Transportation  "(Medical):    • Lack of Transportation (Non-Medical):    Physical Activity:    • Days of Exercise per Week:    • Minutes of Exercise per Session:    Stress:    • Feeling of Stress :    Social Connections:    • Frequency of Communication with Friends and Family:    • Frequency of Social Gatherings with Friends and Family:    • Attends Yazidi Services:    • Active Member of Clubs or Organizations:    • Attends Club or Organization Meetings:    • Marital Status:    Intimate Partner Violence:    • Fear of Current or Ex-Partner:    • Emotionally Abused:    • Physically Abused:    • Sexually Abused:        Objective:     Vitals:    11/05/21 0949 11/05/21 0952   BP:  126/82   BP Location:  Left arm   Patient Position:  Sitting   BP Cuff Size:  Adult long   Pulse:  66   Resp:  18   SpO2:  95%   Weight: (!) 148 kg (326 lb) (!) 148 kg (326 lb)   Height:  1.702 m (5' 7\")     Body mass index is 51.06 kg/m².     Physical Exam:   VS: reviewed  GEN: appearance nl, NAD  SKIN: no rashes or lesions, anicteric   HEAD: AT/NC  EYES: conjunctiva clear, pupils equal  EARS: hearing  normal, external ear wnl  NOSE: no discharge, external nose wnl  RESP: CTAB  CV: RRR, no MRG  EXT: no clubbing, cyanosis or edema  MSK: normal gait   NEURO: moving all four extremities w/o focal deficit; alert, awake, cooperative, answers questions appropriately  PSYCH: mood and affect appropriate; not depressed     Assessment & Plan:   HTN (hypertension)  Blood pressure is well-controlled today.    Impaired mobility and ADLs  She will continue to receive home aid for now. Nutrition plan includes coordinating with family and home care to make a plan.    HTN (hypertension)  Blood pressure is well-controlled today.    Impaired mobility and ADLs  She will continue to receive home aid for now. Nutrition plan includes coordinating with family and home care to make a plan.    Mood disorder (HCC)  Pt will pursue counseling through home health " agency.        Followup: No follow-ups on file.    Quirino Willett D.O.    Please note that this dictation was created using voice recognition software. I have made every reasonable attempt to correct obvious errors, but I expect that there are errors of grammar and possibly content that I did not discover before finalizing the note.

## 2021-11-05 NOTE — ASSESSMENT & PLAN NOTE
She will continue to receive home aid for now. Nutrition plan includes coordinating with family and home care to make a plan.

## 2021-11-29 RX ORDER — GLIPIZIDE 5 MG/1
5 TABLET ORAL DAILY
Qty: 30 TABLET | Refills: 0 | Status: SHIPPED
Start: 2021-11-29 | End: 2022-01-05 | Stop reason: SDUPTHER

## 2021-11-29 RX ORDER — METOPROLOL TARTRATE 50 MG/1
TABLET, FILM COATED ORAL
Qty: 60 TABLET | Refills: 3 | Status: SHIPPED | OUTPATIENT
Start: 2021-11-29 | End: 2022-01-19

## 2021-11-29 NOTE — TELEPHONE ENCOUNTER
Request from pharmacy stating that insurance will not cover Glyburide and requesting RX be changed to Glipizide.

## 2022-01-05 RX ORDER — CEFTRIAXONE 2 G/1
INJECTION, POWDER, FOR SOLUTION INTRAMUSCULAR; INTRAVENOUS
COMMUNITY
Start: 2021-10-23 | End: 2022-11-11

## 2022-01-05 RX ORDER — ATORVASTATIN CALCIUM 20 MG/1
TABLET, FILM COATED ORAL
COMMUNITY
Start: 2021-10-25 | End: 2022-11-11

## 2022-01-05 RX ORDER — LISINOPRIL 10 MG/1
TABLET ORAL
COMMUNITY
Start: 2021-10-25 | End: 2022-11-11

## 2022-01-05 RX ORDER — GLIPIZIDE 5 MG/1
5 TABLET ORAL DAILY
Qty: 30 TABLET | Refills: 0 | Status: SHIPPED | OUTPATIENT
Start: 2022-01-05 | End: 2022-11-11

## 2022-01-19 RX ORDER — METOPROLOL TARTRATE 50 MG/1
TABLET, FILM COATED ORAL
Qty: 180 TABLET | Refills: 1 | Status: SHIPPED | OUTPATIENT
Start: 2022-01-19 | End: 2022-08-31

## 2022-01-19 NOTE — TELEPHONE ENCOUNTER
Received request via: Pharmacy    Was the patient seen in the last year in this department? yes    Does the patient have an active prescription (recently filled or refills available) for medication(s) requested? no

## 2022-01-20 ENCOUNTER — APPOINTMENT (OUTPATIENT)
Dept: MEDICAL GROUP | Facility: OTHER | Age: 67
End: 2022-01-20
Payer: MEDICARE

## 2022-01-23 DIAGNOSIS — N39.0 ACUTE UTI: ICD-10-CM

## 2022-01-25 RX ORDER — CEFDINIR 300 MG/1
CAPSULE ORAL
Qty: 20 CAPSULE | Refills: 0 | Status: SHIPPED | OUTPATIENT
Start: 2022-01-25 | End: 2022-11-11

## 2022-02-02 DIAGNOSIS — R19.5 LOOSE STOOLS: ICD-10-CM

## 2022-02-04 RX ORDER — LOPERAMIDE HYDROCHLORIDE 2 MG/1
2 CAPSULE ORAL 4 TIMES DAILY PRN
Qty: 30 CAPSULE | Refills: 0 | Status: SHIPPED | OUTPATIENT
Start: 2022-02-04 | End: 2022-04-29

## 2022-04-25 RX ORDER — ATORVASTATIN CALCIUM 80 MG/1
TABLET, FILM COATED ORAL
Qty: 90 TABLET | Refills: 1 | Status: SHIPPED | OUTPATIENT
Start: 2022-04-25 | End: 2022-10-04

## 2022-04-25 RX ORDER — LISINOPRIL 5 MG/1
5 TABLET ORAL DAILY
Qty: 90 TABLET | Refills: 1 | Status: SHIPPED | OUTPATIENT
Start: 2022-04-25 | End: 2022-10-04

## 2022-04-27 RX ORDER — GABAPENTIN 300 MG/1
CAPSULE ORAL
Qty: 180 CAPSULE | Refills: 1 | Status: SHIPPED | OUTPATIENT
Start: 2022-04-27 | End: 2022-10-04

## 2022-04-28 ENCOUNTER — APPOINTMENT (OUTPATIENT)
Dept: MEDICAL GROUP | Facility: OTHER | Age: 67
End: 2022-04-28
Payer: MEDICARE

## 2022-04-29 DIAGNOSIS — R19.5 LOOSE STOOLS: ICD-10-CM

## 2022-04-29 RX ORDER — LOPERAMIDE HYDROCHLORIDE 2 MG/1
CAPSULE ORAL
Qty: 30 CAPSULE | Refills: 0 | Status: SHIPPED | OUTPATIENT
Start: 2022-04-29

## 2022-07-22 ENCOUNTER — APPOINTMENT (OUTPATIENT)
Dept: MEDICAL GROUP | Facility: CLINIC | Age: 67
End: 2022-07-22
Payer: MEDICARE

## 2022-08-31 RX ORDER — METOPROLOL TARTRATE 50 MG/1
TABLET, FILM COATED ORAL
Qty: 180 TABLET | Refills: 1 | Status: SHIPPED | OUTPATIENT
Start: 2022-08-31 | End: 2022-11-11

## 2022-09-02 ENCOUNTER — APPOINTMENT (OUTPATIENT)
Dept: MEDICAL GROUP | Facility: OTHER | Age: 67
End: 2022-09-02
Payer: MEDICARE

## 2022-10-04 RX ORDER — ATORVASTATIN CALCIUM 80 MG/1
TABLET, FILM COATED ORAL
Qty: 90 TABLET | Refills: 1 | Status: SHIPPED | OUTPATIENT
Start: 2022-10-04 | End: 2022-11-11 | Stop reason: SDUPTHER

## 2022-10-04 RX ORDER — GABAPENTIN 300 MG/1
CAPSULE ORAL
Qty: 180 CAPSULE | Refills: 1 | Status: SHIPPED | OUTPATIENT
Start: 2022-10-04 | End: 2022-11-11 | Stop reason: SDUPTHER

## 2022-10-04 RX ORDER — LISINOPRIL 5 MG/1
5 TABLET ORAL DAILY
Qty: 90 TABLET | Refills: 1 | Status: SHIPPED | OUTPATIENT
Start: 2022-10-04 | End: 2022-11-11

## 2022-11-11 ENCOUNTER — PATIENT MESSAGE (OUTPATIENT)
Dept: HEALTH INFORMATION MANAGEMENT | Facility: OTHER | Age: 67
End: 2022-11-11

## 2022-11-11 ENCOUNTER — OFFICE VISIT (OUTPATIENT)
Dept: MEDICAL GROUP | Facility: OTHER | Age: 67
End: 2022-11-11
Payer: MEDICARE

## 2022-11-11 VITALS
TEMPERATURE: 98.2 F | HEIGHT: 67 IN | DIASTOLIC BLOOD PRESSURE: 84 MMHG | WEIGHT: 293 LBS | HEART RATE: 63 BPM | SYSTOLIC BLOOD PRESSURE: 128 MMHG | BODY MASS INDEX: 45.99 KG/M2 | OXYGEN SATURATION: 97 %

## 2022-11-11 DIAGNOSIS — R73.9 HYPERGLYCEMIA: ICD-10-CM

## 2022-11-11 DIAGNOSIS — E78.5 DYSLIPIDEMIA: ICD-10-CM

## 2022-11-11 DIAGNOSIS — R53.1 LEFT-SIDED WEAKNESS: ICD-10-CM

## 2022-11-11 DIAGNOSIS — E11.65 TYPE 2 DIABETES MELLITUS WITH HYPERGLYCEMIA, UNSPECIFIED WHETHER LONG TERM INSULIN USE (HCC): ICD-10-CM

## 2022-11-11 LAB
HBA1C MFR BLD: 6.9 % (ref 0–5.6)
INT CON NEG: NEGATIVE
INT CON POS: POSITIVE

## 2022-11-11 PROCEDURE — 99214 OFFICE O/P EST MOD 30 MIN: CPT | Performed by: FAMILY MEDICINE

## 2022-11-11 PROCEDURE — 83036 HEMOGLOBIN GLYCOSYLATED A1C: CPT | Performed by: FAMILY MEDICINE

## 2022-11-11 RX ORDER — ATORVASTATIN CALCIUM 20 MG/1
40 TABLET, FILM COATED ORAL DAILY
Qty: 90 TABLET | Refills: 2 | Status: CANCELLED | OUTPATIENT
Start: 2022-11-11 | End: 2023-08-08

## 2022-11-11 RX ORDER — ATORVASTATIN CALCIUM 80 MG/1
80 TABLET, FILM COATED ORAL EVERY EVENING
Qty: 90 TABLET | Refills: 3 | Status: SHIPPED | OUTPATIENT
Start: 2022-11-11 | End: 2023-03-29 | Stop reason: SDUPTHER

## 2022-11-11 RX ORDER — GABAPENTIN 300 MG/1
300 CAPSULE ORAL 2 TIMES DAILY
Qty: 180 CAPSULE | Refills: 2 | Status: SHIPPED | OUTPATIENT
Start: 2022-11-11 | End: 2023-02-13 | Stop reason: SDUPTHER

## 2022-11-11 ASSESSMENT — FIBROSIS 4 INDEX: FIB4 SCORE: 1.37

## 2022-11-11 NOTE — PROGRESS NOTES
Subjective:   CC:   Chief Complaint   Patient presents with    Follow-Up     Referral for new wheelchair to [facility]  New script for batteries for current wheelchair       HPI: Duyen is a 67 y.o. female who presents today for the following problems:    Problem   Left-Sided Weakness    duyen comes in to see me today for follow-up on multiple issues.  Her first however is it needs a battery refill for her motorized wheelchair.  Ever since her stroke which left her weak on her left side she has been dependent upon the wheelchair for ambulation outside of her house.  Currently the battery is starting to fail and she needs a prescription for a new one.     Dyslipidemia    Has a history of dyslipidemia and has continuing to take her atorvastatin.  She needs refill on her medication.  She has a history of stroke so her statin medication is important.  She denies any issues with it.     DM (diabetes mellitus) (CMS-[facility])    duyen is no longer taking her diabetes medications and is actually doing very well with them.  Hemoglobin A1c today was 6.9.  She feels well and denies any polyuria states that she watches her diet fairly well and eats as healthy as she possibly can.  Denies any increase in her numbness to her hands or feet.         Current Outpatient Medications   Medication Sig Dispense Refill    gabapentin (NEURONTIN) 300 MG Cap Take 1 Capsule by mouth 2 times a day. 180 Capsule 2    atorvastatin (LIPITOR) 80 MG tablet Take 1 Tablet by mouth every evening. 90 Tablet 3    aspirin (ASA) 81 MG Chew Tab chewable tablet Chew 1 Tablet every day. 100 Tablet     loperamide (IMODIUM) 2 MG Cap TAKE 1 CAPSULE BY MOUTH 4 TIMES A DAY AS NEEDED FOR DIARRHEA FOR UP TO 5 DAYS *R19.5* (Patient not taking: Reported on 11/11/2022) 30 Capsule 0    acetaminophen (TYLENOL) 325 MG Tab Take 2 Tablets by mouth every 6 hours as needed for Mild Pain or Fever. (Patient not taking: Reported on 11/11/2022) 30 Tablet 0    Dextrose, Diabetic Use,  "(GLUCOSE) 4 g chewable tablet Chew 4 Tablets as needed for Low Blood Sugar (If FSBG is less than or equal to 70 mg/dL and patient able to eat or drink). (Patient not taking: Reported on 11/11/2022) 30 Tablet     senna-docusate (PERICOLACE OR SENOKOT S) 8.6-50 MG Tab Take 2 Tablets by mouth 2 times a day. (Patient not taking: Reported on 11/11/2022) 30 Tablet 0    magnesium hydroxide (MILK OF MAGNESIA) 400 MG/5ML Suspension Take 30 mL by mouth 1 time a day as needed (if polyethylene glycol ineffective after 24 hours). (Patient not taking: Reported on 11/11/2022)       No current facility-administered medications for this visit.       Social History     Tobacco Use    Smoking status: Never    Smokeless tobacco: Never   Substance Use Topics    Alcohol use: No    Drug use: Yes     Types: Marijuana       Review of Systems   Constitutional: Negative.    HENT: Negative.     Eyes: Negative.    Respiratory: Negative.     Cardiovascular: Negative.    Gastrointestinal: Negative.    Skin: Negative.    Neurological: Negative.    Psychiatric/Behavioral: Negative.         Objective:     Vitals:    11/11/22 1041   BP: 128/84   BP Location: Left arm   Patient Position: Sitting   Pulse: 63   Temp: 36.8 °C (98.2 °F)   SpO2: 97%   Weight: (!) 148 kg (326 lb)   Height: 1.702 m (5' 7\")     Body mass index is 51.06 kg/m².     Physical Exam  Vitals reviewed.   Constitutional:       Appearance: Normal appearance. She is obese.   HENT:      Head: Normocephalic and atraumatic.      Mouth/Throat:      Mouth: Mucous membranes are moist.   Eyes:      Extraocular Movements: Extraocular movements intact.      Pupils: Pupils are equal, round, and reactive to light.   Cardiovascular:      Rate and Rhythm: Normal rate and regular rhythm.      Pulses: Normal pulses.      Heart sounds: Normal heart sounds. No murmur heard.    No friction rub. No gallop.   Pulmonary:      Effort: Pulmonary effort is normal. No respiratory distress.      Breath sounds: " Normal breath sounds. No stridor. No wheezing, rhonchi or rales.   Neurological:      General: No focal deficit present.      Mental Status: She is alert and oriented to person, place, and time. Mental status is at baseline.   Psychiatric:         Mood and Affect: Mood normal.         Behavior: Behavior normal.         Thought Content: Thought content normal.        Assessment & Plan:   Left-sided weakness  An order was written for a new motorized wheelchair battery for her today.    We will have her follow-up in 6 weeks make sure she got the new battery    DM (diabetes mellitus) (CMS-HCC)  I will have her continue her diet and recommend that she try to get as much physical exertion as she can.  Recheck her hemoglobin A1c in 4 to 6 months.    Dyslipidemia  Refill her Lipitor today have her follow-up in 4 to 6 months.      Followup: Return in about 6 weeks (around 12/23/2022), or if symptoms worsen or fail to improve.    Ever Chamberlain M.D.    Please note that this dictation was created using voice recognition software. I have made every reasonable attempt to correct obvious errors, but I expect that there are errors of grammar and possibly content that I did not discover before finalizing the note.

## 2022-11-17 ASSESSMENT — ENCOUNTER SYMPTOMS
CARDIOVASCULAR NEGATIVE: 1
NEUROLOGICAL NEGATIVE: 1
EYES NEGATIVE: 1
PSYCHIATRIC NEGATIVE: 1
RESPIRATORY NEGATIVE: 1
CONSTITUTIONAL NEGATIVE: 1
GASTROINTESTINAL NEGATIVE: 1

## 2022-11-17 ASSESSMENT — PATIENT HEALTH QUESTIONNAIRE - PHQ9
CLINICAL INTERPRETATION OF PHQ2 SCORE: 1
5. POOR APPETITE OR OVEREATING: 0 - NOT AT ALL

## 2022-11-17 NOTE — ASSESSMENT & PLAN NOTE
An order was written for a new motorized wheelchair battery for her today.    We will have her follow-up in 6 weeks make sure she got the new battery

## 2022-11-17 NOTE — ASSESSMENT & PLAN NOTE
I will have her continue her diet and recommend that she try to get as much physical exertion as she can.  Recheck her hemoglobin A1c in 4 to 6 months.

## 2022-11-22 ENCOUNTER — TELEPHONE (OUTPATIENT)
Dept: MEDICAL GROUP | Facility: OTHER | Age: 67
End: 2022-11-22

## 2022-11-22 NOTE — TELEPHONE ENCOUNTER
Patient is requesting new order for a New Power Chair be sent to [facility], she states she discussed that with you when she came in at her last appointment. Please advise

## 2023-01-03 ENCOUNTER — APPOINTMENT (OUTPATIENT)
Dept: MEDICAL GROUP | Facility: OTHER | Age: 68
End: 2023-01-03
Payer: MEDICARE

## 2023-02-13 RX ORDER — GABAPENTIN 300 MG/1
300 CAPSULE ORAL 2 TIMES DAILY
Qty: 180 CAPSULE | Refills: 2 | Status: SHIPPED | OUTPATIENT
Start: 2023-02-13 | End: 2023-06-08 | Stop reason: SDUPTHER

## 2023-03-29 ENCOUNTER — OFFICE VISIT (OUTPATIENT)
Dept: MEDICAL GROUP | Facility: OTHER | Age: 68
End: 2023-03-29
Payer: MEDICARE

## 2023-03-29 VITALS
DIASTOLIC BLOOD PRESSURE: 90 MMHG | OXYGEN SATURATION: 93 % | TEMPERATURE: 98.1 F | HEIGHT: 67 IN | SYSTOLIC BLOOD PRESSURE: 160 MMHG | WEIGHT: 293 LBS | BODY MASS INDEX: 45.99 KG/M2 | HEART RATE: 123 BPM

## 2023-03-29 DIAGNOSIS — M62.81 LEFT-SIDED MUSCLE WEAKNESS: ICD-10-CM

## 2023-03-29 DIAGNOSIS — E78.00 HYPERCHOLESTEREMIA: ICD-10-CM

## 2023-03-29 DIAGNOSIS — Z74.09 IMPAIRED MOBILITY AND ADLS: ICD-10-CM

## 2023-03-29 DIAGNOSIS — F32.1 CURRENT MODERATE EPISODE OF MAJOR DEPRESSIVE DISORDER WITHOUT PRIOR EPISODE (HCC): ICD-10-CM

## 2023-03-29 DIAGNOSIS — Z78.9 IMPAIRED MOBILITY AND ADLS: ICD-10-CM

## 2023-03-29 DIAGNOSIS — G89.4 CHRONIC PAIN SYNDROME: ICD-10-CM

## 2023-03-29 DIAGNOSIS — I10 PRIMARY HYPERTENSION: ICD-10-CM

## 2023-03-29 DIAGNOSIS — R73.9 HYPERGLYCEMIA: ICD-10-CM

## 2023-03-29 DIAGNOSIS — G62.9 NEUROPATHY: ICD-10-CM

## 2023-03-29 PROCEDURE — 99214 OFFICE O/P EST MOD 30 MIN: CPT | Performed by: FAMILY MEDICINE

## 2023-03-29 RX ORDER — ASPIRIN 81 MG/1
81 TABLET, CHEWABLE ORAL DAILY
Qty: 100 TABLET | Refills: 2 | Status: SHIPPED | OUTPATIENT
Start: 2023-03-29 | End: 2023-09-25

## 2023-03-29 RX ORDER — ATORVASTATIN CALCIUM 80 MG/1
80 TABLET, FILM COATED ORAL EVERY EVENING
Qty: 90 TABLET | Refills: 3 | Status: SHIPPED | OUTPATIENT
Start: 2023-03-29

## 2023-03-29 ASSESSMENT — FIBROSIS 4 INDEX: FIB4 SCORE: 1.37

## 2023-03-29 ASSESSMENT — PATIENT HEALTH QUESTIONNAIRE - PHQ9
CLINICAL INTERPRETATION OF PHQ2 SCORE: 2
5. POOR APPETITE OR OVEREATING: 1 - SEVERAL DAYS
SUM OF ALL RESPONSES TO PHQ QUESTIONS 1-9: 8

## 2023-04-03 ASSESSMENT — ENCOUNTER SYMPTOMS
RESPIRATORY NEGATIVE: 1
EYES NEGATIVE: 1
GASTROINTESTINAL NEGATIVE: 1
CARDIOVASCULAR NEGATIVE: 1
NEUROLOGICAL NEGATIVE: 1
CONSTITUTIONAL NEGATIVE: 1
PSYCHIATRIC NEGATIVE: 1

## 2023-04-03 NOTE — PROGRESS NOTES
Subjective:   CC:   Chief Complaint   Patient presents with    Follow-Up     Aspirin refill       HPI: Ariella is a 67 y.o. female who presents today for the following problems:    Problem   Neuropathy    Ariella has a long history of neuropathy which is actually starting to bother her more so now than it was previously.  She is currently on gabapentin 300 mg twice a day.  She states that she has pain and tingling to her feet and numbness to her feet which really bothers her.  It interferes with her ability to ambulate as well.     Impaired Mobility and Adls    Because of her left-sided weakness Ariella is impaired when it comes to mobility and is very dependent upon her electric wheelchair.  Her electric wheelchair allows her to be able to leave her home and do the things she needs to do for herself.  At this point though her wheelchair is starting to have issues and she is asking for a new one.     Depression    Talked around a little bit about her depression today she states that she is fairly depressed but denies any suicidal ideation or homicidal ideation.  She states that her quality life is just not when she wants it to be.  We talked for a little bit about getting on a medication for depression and she did not want to do that at this time.  She did have a  come in and sit with her during this visit, and I talked to the  about this aspect and that maybe it was possible for us to get her into some therapy or some group activities at her home.     Left-Sided Muscle Weakness    Ariella comes in to see me today for follow-up on her left-sided weakness.  Ariella had a stroke many years ago and states that since then she has had weakness in her left side.  I have seen Ariella many times and her weakness from the previous office visit and now is about the same and she states that she has a difficult time transferring from her electric wheelchair to a chair or transfer bench.  She is hoping that  she can try to lose a little bit of weight and that will probably help her to transfer.         Current Outpatient Medications   Medication Sig Dispense Refill    aspirin (ASA) 81 MG Chew Tab chewable tablet Chew 1 Tablet every day for 180 days. 100 Tablet 2    atorvastatin (LIPITOR) 80 MG tablet Take 1 Tablet by mouth every evening. 90 Tablet 3    gabapentin (NEURONTIN) 300 MG Cap Take 1 Capsule by mouth 2 times a day. 180 Capsule 2    loperamide (IMODIUM) 2 MG Cap TAKE 1 CAPSULE BY MOUTH 4 TIMES A DAY AS NEEDED FOR DIARRHEA FOR UP TO 5 DAYS *R19.5* (Patient not taking: Reported on 11/11/2022) 30 Capsule 0    acetaminophen (TYLENOL) 325 MG Tab Take 2 Tablets by mouth every 6 hours as needed for Mild Pain or Fever. (Patient not taking: Reported on 11/11/2022) 30 Tablet 0    Dextrose, Diabetic Use, (GLUCOSE) 4 g chewable tablet Chew 4 Tablets as needed for Low Blood Sugar (If FSBG is less than or equal to 70 mg/dL and patient able to eat or drink). (Patient not taking: Reported on 11/11/2022) 30 Tablet     senna-docusate (PERICOLACE OR SENOKOT S) 8.6-50 MG Tab Take 2 Tablets by mouth 2 times a day. (Patient not taking: Reported on 11/11/2022) 30 Tablet 0    magnesium hydroxide (MILK OF MAGNESIA) 400 MG/5ML Suspension Take 30 mL by mouth 1 time a day as needed (if polyethylene glycol ineffective after 24 hours). (Patient not taking: Reported on 11/11/2022)       No current facility-administered medications for this visit.       Social History     Tobacco Use    Smoking status: Never    Smokeless tobacco: Never   Substance Use Topics    Alcohol use: No    Drug use: Yes     Types: Marijuana       Review of Systems   Constitutional: Negative.    HENT: Negative.     Eyes: Negative.    Respiratory: Negative.     Cardiovascular: Negative.    Gastrointestinal: Negative.    Skin: Negative.    Neurological: Negative.    Psychiatric/Behavioral: Negative.         Objective:     Vitals:    03/29/23 0910   BP: (!) 160/90   BP  "Location: Right arm   Patient Position: Sitting   Pulse: (!) 123   Temp: 36.7 °C (98.1 °F)   TempSrc: Temporal   SpO2: 93%   Weight: (!) 148 kg (326 lb)   Height: 1.702 m (5' 7\")     Body mass index is 51.06 kg/m².     Physical Exam  Vitals reviewed.   Constitutional:       Appearance: Normal appearance. She is obese.   HENT:      Head: Normocephalic and atraumatic.   Eyes:      Extraocular Movements: Extraocular movements intact.      Pupils: Pupils are equal, round, and reactive to light.   Cardiovascular:      Rate and Rhythm: Normal rate and regular rhythm.      Pulses: Normal pulses.      Heart sounds: Normal heart sounds. No murmur heard.    No friction rub. No gallop.   Pulmonary:      Effort: Pulmonary effort is normal. No respiratory distress.      Breath sounds: Normal breath sounds. No stridor. No wheezing, rhonchi or rales.   Musculoskeletal:      Cervical back: Normal range of motion.      Comments: Left-sided weakness appreciated in both upper and lower extremities.  Strength is 4-5 on the left-hand side   Skin:     Capillary Refill: Capillary refill takes less than 2 seconds.   Neurological:      General: No focal deficit present.      Mental Status: She is alert and oriented to person, place, and time.   Psychiatric:         Mood and Affect: Mood normal.        Assessment & Plan:   Left-sided muscle weakness  For her left-sided weakness and going to go ahead and order some physical therapy for her we will have her follow-up with me in about a month.    Impaired mobility and ADLs  Ariella is having issues with her wheelchair and it seems to be \"falling apart\".  Collette go ahead and order another wheelchair and hope that they can get her 1 that elevates so that she can get out of the chair easier.  Order for wheelchair was written    Depression  Social work to work on helping her with maybe some therapy and some other group activities we will have her follow-up with me in about a month.  If she starts to " have any suicidal or homicidal ideations she is to access the helpline which I know she has    Neuropathy  Collette go ahead and increase her gabapentin to 300 mg 3 times daily and let see how she does.  In addition I Collette go ahead and reorder some labs to make sure that her pre-diabetes       Followup: No follow-ups on file.    Ever Chamberlain M.D.    Please note that this dictation was created using voice recognition software. I have made every reasonable attempt to correct obvious errors, but I expect that there are errors of grammar and possibly content that I did not discover before finalizing the note.

## 2023-04-03 NOTE — ASSESSMENT & PLAN NOTE
For her left-sided weakness and going to go ahead and order some physical therapy for her we will have her follow-up with me in about a month.

## 2023-04-03 NOTE — ASSESSMENT & PLAN NOTE
Collette go ahead and increase her gabapentin to 300 mg 3 times daily and let see how she does.  In addition I Collette go ahead and reorder some labs to make sure that her pre-diabetes

## 2023-04-03 NOTE — ASSESSMENT & PLAN NOTE
Social work to work on helping her with maybe some therapy and some other group activities we will have her follow-up with me in about a month.  If she starts to have any suicidal or homicidal ideations she is to access the helpline which I know she has

## 2023-04-03 NOTE — ASSESSMENT & PLAN NOTE
"Ariella is having issues with her wheelchair and it seems to be \"falling apart\".  Collette go ahead and order another wheelchair and hope that they can get her 1 that elevates so that she can get out of the chair easier.  Order for wheelchair was written  "

## 2023-05-16 ENCOUNTER — APPOINTMENT (OUTPATIENT)
Dept: MEDICAL GROUP | Facility: OTHER | Age: 68
End: 2023-05-16
Payer: MEDICARE

## 2023-06-08 ENCOUNTER — OFFICE VISIT (OUTPATIENT)
Dept: MEDICAL GROUP | Facility: OTHER | Age: 68
End: 2023-06-08
Payer: MEDICARE

## 2023-06-08 VITALS
DIASTOLIC BLOOD PRESSURE: 88 MMHG | WEIGHT: 293 LBS | TEMPERATURE: 98 F | HEART RATE: 72 BPM | HEIGHT: 67 IN | SYSTOLIC BLOOD PRESSURE: 130 MMHG | BODY MASS INDEX: 45.99 KG/M2 | OXYGEN SATURATION: 93 %

## 2023-06-08 DIAGNOSIS — E11.65 TYPE 2 DIABETES MELLITUS WITH HYPERGLYCEMIA, UNSPECIFIED WHETHER LONG TERM INSULIN USE (HCC): ICD-10-CM

## 2023-06-08 DIAGNOSIS — E78.00 HYPERCHOLESTEREMIA: ICD-10-CM

## 2023-06-08 DIAGNOSIS — E66.01 CLASS 3 SEVERE OBESITY DUE TO EXCESS CALORIES WITH SERIOUS COMORBIDITY AND BODY MASS INDEX (BMI) OF 45.0 TO 49.9 IN ADULT (HCC): ICD-10-CM

## 2023-06-08 DIAGNOSIS — E78.5 DYSLIPIDEMIA: ICD-10-CM

## 2023-06-08 DIAGNOSIS — I10 PRIMARY HYPERTENSION: ICD-10-CM

## 2023-06-08 DIAGNOSIS — K08.123: ICD-10-CM

## 2023-06-08 DIAGNOSIS — F32.0 CURRENT MILD EPISODE OF MAJOR DEPRESSIVE DISORDER, UNSPECIFIED WHETHER RECURRENT (HCC): ICD-10-CM

## 2023-06-08 PROCEDURE — 99214 OFFICE O/P EST MOD 30 MIN: CPT | Performed by: FAMILY MEDICINE

## 2023-06-08 PROCEDURE — 3079F DIAST BP 80-89 MM HG: CPT | Performed by: FAMILY MEDICINE

## 2023-06-08 PROCEDURE — 3075F SYST BP GE 130 - 139MM HG: CPT | Performed by: FAMILY MEDICINE

## 2023-06-08 RX ORDER — GABAPENTIN 300 MG/1
600 CAPSULE ORAL 2 TIMES DAILY
Qty: 180 CAPSULE | Refills: 2 | Status: SHIPPED | OUTPATIENT
Start: 2023-06-08

## 2023-06-08 ASSESSMENT — FIBROSIS 4 INDEX: FIB4 SCORE: 1.37

## 2023-06-13 ASSESSMENT — ENCOUNTER SYMPTOMS
RESPIRATORY NEGATIVE: 1
NEUROLOGICAL NEGATIVE: 1
CARDIOVASCULAR NEGATIVE: 1
GASTROINTESTINAL NEGATIVE: 1
PSYCHIATRIC NEGATIVE: 1
CONSTITUTIONAL NEGATIVE: 1
EYES NEGATIVE: 1

## 2023-06-13 NOTE — PROGRESS NOTES
Subjective:   CC:   Chief Complaint   Patient presents with    Follow-Up     Gabapentin refill       HPI: Ariella is a 67 y.o. female who presents today for the following problems:    Problem   Dyslipidemia    Ariella has a history of dyslipidemia for which she takes atorvastatin 80 mg.  She has a history of TIA so her cholesterol level is an important aspect to her medical management.  She denies any further TIAs states that she has been feeling very well and tolerates the low Lipitor very well     Depression    Discussed Ariella's depression with her.  She is actually doing pretty well at the current moment.  She seems to have some support groups in place.  And has people interested in helping her.  She denies any suicidal thoughts or ideations.     DM (diabetes mellitus) (CMS-HCC)    Ariella comes in to see me today for follow-up on her diabetes.  Her diabetes started to creep back up above 7 on her A1c and she has not been doing well with her diet.  As result we are going to go ahead and try to get her back on diabetes medication.  She has always not been a fan of the metformin and now is asking if she can try semaglutide.  We are going to go ahead and order some semaglutide for her.  She denies any polyuria but does have polydipsia.           Current Outpatient Medications   Medication Sig Dispense Refill    gabapentin (NEURONTIN) 300 MG Cap Take 2 Capsules by mouth 2 times a day. 180 Capsule 2    Semaglutide,0.25 or 0.5MG/DOS, 2 MG/1.5ML Solution Pen-injector Inject 0.25 mg under the skin every 7 days for 30 days. 1 Each 2    aspirin (ASA) 81 MG Chew Tab chewable tablet Chew 1 Tablet every day for 180 days. 100 Tablet 2    atorvastatin (LIPITOR) 80 MG tablet Take 1 Tablet by mouth every evening. 90 Tablet 3    loperamide (IMODIUM) 2 MG Cap TAKE 1 CAPSULE BY MOUTH 4 TIMES A DAY AS NEEDED FOR DIARRHEA FOR UP TO 5 DAYS *R19.5* (Patient not taking: Reported on 11/11/2022) 30 Capsule 0    acetaminophen (TYLENOL) 325 MG  "Tab Take 2 Tablets by mouth every 6 hours as needed for Mild Pain or Fever. (Patient not taking: Reported on 11/11/2022) 30 Tablet 0    Dextrose, Diabetic Use, (GLUCOSE) 4 g chewable tablet Chew 4 Tablets as needed for Low Blood Sugar (If FSBG is less than or equal to 70 mg/dL and patient able to eat or drink). (Patient not taking: Reported on 11/11/2022) 30 Tablet     senna-docusate (PERICOLACE OR SENOKOT S) 8.6-50 MG Tab Take 2 Tablets by mouth 2 times a day. (Patient not taking: Reported on 11/11/2022) 30 Tablet 0    magnesium hydroxide (MILK OF MAGNESIA) 400 MG/5ML Suspension Take 30 mL by mouth 1 time a day as needed (if polyethylene glycol ineffective after 24 hours). (Patient not taking: Reported on 11/11/2022)       No current facility-administered medications for this visit.       Social History     Tobacco Use    Smoking status: Never    Smokeless tobacco: Never   Substance Use Topics    Alcohol use: No    Drug use: Yes     Types: Marijuana       Review of Systems   Constitutional: Negative.    HENT: Negative.     Eyes: Negative.    Respiratory: Negative.     Cardiovascular: Negative.    Gastrointestinal: Negative.    Skin: Negative.    Neurological: Negative.    Psychiatric/Behavioral: Negative.           Objective:     Vitals:    06/08/23 1101   BP: 130/88   BP Location: Right arm   Patient Position: Sitting   Pulse: 72   Temp: 36.7 °C (98 °F)   TempSrc: Temporal   SpO2: 93%   Weight: (!) 148 kg (326 lb)   Height: 1.702 m (5' 7\")     Body mass index is 51.06 kg/m².     Physical Exam  Vitals reviewed.   Constitutional:       Appearance: Normal appearance.   HENT:      Head: Normocephalic and atraumatic.   Cardiovascular:      Rate and Rhythm: Normal rate and regular rhythm.      Pulses: Normal pulses.      Heart sounds: Normal heart sounds.   Pulmonary:      Effort: Pulmonary effort is normal.      Breath sounds: Normal breath sounds.   Abdominal:      General: Abdomen is flat.      Palpations: Abdomen is " soft.   Neurological:      Mental Status: She is alert.          Assessment & Plan:   DM (diabetes mellitus) (CMS-HCC)  Going to go ahead and order semaglutide for her.  We will ask her to follow-up with me again in about 3 weeks.  Warnings given regarding the medication nauseousness and constipation.    Depression  Going to recommend that she continue with pursuing some cognitive therapy for herself.  We will have her follow-up with me in a few months.  Sooner as needed    Dyslipidemia  Recommend that she continue her Lipitor we will follow her up again in a couple of months.      Followup: No follow-ups on file.    Ever Chamberlain M.D.    Please note that this dictation was created using voice recognition software. I have made every reasonable attempt to correct obvious errors, but I expect that there are errors of grammar and possibly content that I did not discover before finalizing the note.

## 2023-06-13 NOTE — ASSESSMENT & PLAN NOTE
Going to go ahead and order semaglutide for her.  We will ask her to follow-up with me again in about 3 weeks.  Warnings given regarding the medication nauseousness and constipation.

## 2023-06-13 NOTE — ASSESSMENT & PLAN NOTE
Going to recommend that she continue with pursuing some cognitive therapy for herself.  We will have her follow-up with me in a few months.  Sooner as needed

## 2023-06-29 NOTE — TELEPHONE ENCOUNTER
Received request via: Patient    Was the patient seen in the last year in this department? Yes    Does the patient have an active prescription (recently filled or refills available) for medication(s) requested? No    Does the patient have half-way Plus and need 100 day supply (blood pressure, diabetes and cholesterol meds only)? Patient does not have SCP  
Spontaneous, unlabored and symmetrical

## 2023-08-03 ENCOUNTER — APPOINTMENT (OUTPATIENT)
Dept: MEDICAL GROUP | Facility: OTHER | Age: 68
End: 2023-08-03
Payer: MEDICARE

## 2023-11-09 RX ORDER — SEMAGLUTIDE 0.68 MG/ML
INJECTION, SOLUTION SUBCUTANEOUS
Qty: 3 ML | Refills: 2 | Status: SHIPPED | OUTPATIENT
Start: 2023-11-09

## 2024-05-28 RX ORDER — SEMAGLUTIDE 0.68 MG/ML
INJECTION, SOLUTION SUBCUTANEOUS
Qty: 1 EACH | Refills: 1 | Status: SHIPPED | OUTPATIENT
Start: 2024-05-28

## 2024-07-24 RX ORDER — SEMAGLUTIDE 0.68 MG/ML
0.25 INJECTION, SOLUTION SUBCUTANEOUS DAILY
Qty: 9 ML | Refills: 1 | Status: SHIPPED | OUTPATIENT
Start: 2024-07-24 | End: 2024-07-26 | Stop reason: SDUPTHER

## 2024-07-26 RX ORDER — SEMAGLUTIDE 0.68 MG/ML
0.25 INJECTION, SOLUTION SUBCUTANEOUS
Qty: 3 ML | Refills: 1 | Status: SHIPPED | OUTPATIENT
Start: 2024-07-26

## 2024-08-23 LAB — HBA1C MFR BLD: 9.4 % (ref ?–5.8)

## 2024-09-25 RX ORDER — SEMAGLUTIDE 0.68 MG/ML
0.25 INJECTION, SOLUTION SUBCUTANEOUS
Qty: 3 ML | Refills: 3 | Status: SHIPPED | OUTPATIENT
Start: 2024-09-25

## 2024-09-25 NOTE — TELEPHONE ENCOUNTER
Received request via: Pharmacy    Was the patient seen in the last year in this department? No    Does the patient have an active prescription (recently filled or refills available) for medication(s) requested? No    Pharmacy Name: [facility]/pharmacy #[id number] - [place], [place] - [address]

## 2025-08-29 RX ORDER — SEMAGLUTIDE 0.68 MG/ML
0.25 INJECTION, SOLUTION SUBCUTANEOUS
Qty: 3 ML | Refills: 3 | Status: SHIPPED | OUTPATIENT
Start: 2025-08-29